# Patient Record
Sex: FEMALE | Race: WHITE | NOT HISPANIC OR LATINO | Employment: OTHER | URBAN - METROPOLITAN AREA
[De-identification: names, ages, dates, MRNs, and addresses within clinical notes are randomized per-mention and may not be internally consistent; named-entity substitution may affect disease eponyms.]

---

## 2024-06-16 ENCOUNTER — HOSPITAL ENCOUNTER (INPATIENT)
Facility: HOSPITAL | Age: 84
LOS: 1 days | Discharge: HOME | DRG: 392 | End: 2024-06-18
Attending: STUDENT IN AN ORGANIZED HEALTH CARE EDUCATION/TRAINING PROGRAM | Admitting: INTERNAL MEDICINE
Payer: MEDICARE

## 2024-06-16 ENCOUNTER — APPOINTMENT (EMERGENCY)
Dept: CT IMAGING | Facility: HOSPITAL | Age: 84
DRG: 392 | End: 2024-06-16
Payer: MEDICARE

## 2024-06-16 DIAGNOSIS — K57.92 DIVERTICULITIS: Primary | ICD-10-CM

## 2024-06-16 DIAGNOSIS — K57.92 ACUTE DIVERTICULITIS: ICD-10-CM

## 2024-06-16 PROBLEM — E11.9 DM (DIABETES MELLITUS) (HCC): Status: ACTIVE | Noted: 2024-06-16

## 2024-06-16 PROBLEM — E78.5 HLD (HYPERLIPIDEMIA): Status: ACTIVE | Noted: 2024-06-16

## 2024-06-16 PROBLEM — I10 HTN (HYPERTENSION): Status: ACTIVE | Noted: 2024-06-16

## 2024-06-16 LAB
ALBUMIN SERPL BCP-MCNC: 3.8 G/DL (ref 3.5–5)
ALP SERPL-CCNC: 79 U/L (ref 34–104)
ALT SERPL W P-5'-P-CCNC: 9 U/L (ref 7–52)
ANION GAP SERPL CALCULATED.3IONS-SCNC: 11 MMOL/L (ref 4–13)
AST SERPL W P-5'-P-CCNC: 11 U/L (ref 13–39)
BACTERIA UR QL AUTO: ABNORMAL /HPF
BASOPHILS # BLD AUTO: 0.04 THOUSANDS/ÂΜL (ref 0–0.1)
BASOPHILS NFR BLD AUTO: 0 % (ref 0–1)
BILIRUB SERPL-MCNC: 0.68 MG/DL (ref 0.2–1)
BILIRUB UR QL STRIP: NEGATIVE
BUDDING YEAST: PRESENT
BUN SERPL-MCNC: 16 MG/DL (ref 5–25)
CALCIUM SERPL-MCNC: 8.5 MG/DL (ref 8.4–10.2)
CHLORIDE SERPL-SCNC: 102 MMOL/L (ref 96–108)
CLARITY UR: CLEAR
CO2 SERPL-SCNC: 23 MMOL/L (ref 21–32)
COLOR UR: COLORLESS
CREAT SERPL-MCNC: 0.85 MG/DL (ref 0.6–1.3)
EOSINOPHIL # BLD AUTO: 0.22 THOUSAND/ÂΜL (ref 0–0.61)
EOSINOPHIL NFR BLD AUTO: 2 % (ref 0–6)
ERYTHROCYTE [DISTWIDTH] IN BLOOD BY AUTOMATED COUNT: 14.9 % (ref 11.6–15.1)
FLUAV RNA RESP QL NAA+PROBE: NEGATIVE
FLUBV RNA RESP QL NAA+PROBE: NEGATIVE
GFR SERPL CREATININE-BSD FRML MDRD: 63 ML/MIN/1.73SQ M
GLUCOSE SERPL-MCNC: 145 MG/DL (ref 65–140)
GLUCOSE SERPL-MCNC: 161 MG/DL (ref 65–140)
GLUCOSE UR STRIP-MCNC: ABNORMAL MG/DL
HCT VFR BLD AUTO: 42.6 % (ref 34.8–46.1)
HGB BLD-MCNC: 14.1 G/DL (ref 11.5–15.4)
HGB UR QL STRIP.AUTO: NEGATIVE
IMM GRANULOCYTES # BLD AUTO: 0.06 THOUSAND/UL (ref 0–0.2)
IMM GRANULOCYTES NFR BLD AUTO: 1 % (ref 0–2)
KETONES UR STRIP-MCNC: NEGATIVE MG/DL
LACTATE SERPL-SCNC: 2.4 MMOL/L (ref 0.5–2)
LACTATE SERPL-SCNC: 3.2 MMOL/L (ref 0.5–2)
LEUKOCYTE ESTERASE UR QL STRIP: ABNORMAL
LIPASE SERPL-CCNC: 17 U/L (ref 11–82)
LYMPHOCYTES # BLD AUTO: 1.73 THOUSANDS/ÂΜL (ref 0.6–4.47)
LYMPHOCYTES NFR BLD AUTO: 17 % (ref 14–44)
MCH RBC QN AUTO: 27.9 PG (ref 26.8–34.3)
MCHC RBC AUTO-ENTMCNC: 33.1 G/DL (ref 31.4–37.4)
MCV RBC AUTO: 84 FL (ref 82–98)
MONOCYTES # BLD AUTO: 0.77 THOUSAND/ÂΜL (ref 0.17–1.22)
MONOCYTES NFR BLD AUTO: 8 % (ref 4–12)
NEUTROPHILS # BLD AUTO: 7.12 THOUSANDS/ÂΜL (ref 1.85–7.62)
NEUTS SEG NFR BLD AUTO: 72 % (ref 43–75)
NITRITE UR QL STRIP: NEGATIVE
NON-SQ EPI CELLS URNS QL MICRO: ABNORMAL /HPF
NRBC BLD AUTO-RTO: 0 /100 WBCS
PH UR STRIP.AUTO: 5 [PH]
PLATELET # BLD AUTO: 260 THOUSANDS/UL (ref 149–390)
PMV BLD AUTO: 8.5 FL (ref 8.9–12.7)
POTASSIUM SERPL-SCNC: 3.6 MMOL/L (ref 3.5–5.3)
PROT SERPL-MCNC: 6.8 G/DL (ref 6.4–8.4)
PROT UR STRIP-MCNC: NEGATIVE MG/DL
RBC # BLD AUTO: 5.05 MILLION/UL (ref 3.81–5.12)
RBC #/AREA URNS AUTO: ABNORMAL /HPF
RSV RNA RESP QL NAA+PROBE: NEGATIVE
SARS-COV-2 RNA RESP QL NAA+PROBE: NEGATIVE
SODIUM SERPL-SCNC: 136 MMOL/L (ref 135–147)
SP GR UR STRIP.AUTO: 1.03 (ref 1–1.03)
UROBILINOGEN UR STRIP-ACNC: <2 MG/DL
WBC # BLD AUTO: 9.94 THOUSAND/UL (ref 4.31–10.16)
WBC #/AREA URNS AUTO: ABNORMAL /HPF

## 2024-06-16 PROCEDURE — 96365 THER/PROPH/DIAG IV INF INIT: CPT

## 2024-06-16 PROCEDURE — 74177 CT ABD & PELVIS W/CONTRAST: CPT

## 2024-06-16 PROCEDURE — 81001 URINALYSIS AUTO W/SCOPE: CPT | Performed by: STUDENT IN AN ORGANIZED HEALTH CARE EDUCATION/TRAINING PROGRAM

## 2024-06-16 PROCEDURE — 0241U HB NFCT DS VIR RESP RNA 4 TRGT: CPT | Performed by: NURSE PRACTITIONER

## 2024-06-16 PROCEDURE — 87086 URINE CULTURE/COLONY COUNT: CPT | Performed by: STUDENT IN AN ORGANIZED HEALTH CARE EDUCATION/TRAINING PROGRAM

## 2024-06-16 PROCEDURE — 83605 ASSAY OF LACTIC ACID: CPT | Performed by: STUDENT IN AN ORGANIZED HEALTH CARE EDUCATION/TRAINING PROGRAM

## 2024-06-16 PROCEDURE — 99284 EMERGENCY DEPT VISIT MOD MDM: CPT

## 2024-06-16 PROCEDURE — 83690 ASSAY OF LIPASE: CPT | Performed by: NURSE PRACTITIONER

## 2024-06-16 PROCEDURE — 96366 THER/PROPH/DIAG IV INF ADDON: CPT

## 2024-06-16 PROCEDURE — 83036 HEMOGLOBIN GLYCOSYLATED A1C: CPT | Performed by: STUDENT IN AN ORGANIZED HEALTH CARE EDUCATION/TRAINING PROGRAM

## 2024-06-16 PROCEDURE — 36415 COLL VENOUS BLD VENIPUNCTURE: CPT | Performed by: NURSE PRACTITIONER

## 2024-06-16 PROCEDURE — 83605 ASSAY OF LACTIC ACID: CPT | Performed by: NURSE PRACTITIONER

## 2024-06-16 PROCEDURE — 80053 COMPREHEN METABOLIC PANEL: CPT | Performed by: NURSE PRACTITIONER

## 2024-06-16 PROCEDURE — 99285 EMERGENCY DEPT VISIT HI MDM: CPT | Performed by: NURSE PRACTITIONER

## 2024-06-16 PROCEDURE — 85025 COMPLETE CBC W/AUTO DIFF WBC: CPT | Performed by: NURSE PRACTITIONER

## 2024-06-16 PROCEDURE — 82948 REAGENT STRIP/BLOOD GLUCOSE: CPT

## 2024-06-16 PROCEDURE — 99223 1ST HOSP IP/OBS HIGH 75: CPT | Performed by: STUDENT IN AN ORGANIZED HEALTH CARE EDUCATION/TRAINING PROGRAM

## 2024-06-16 RX ORDER — SODIUM CHLORIDE 9 MG/ML
100 INJECTION, SOLUTION INTRAVENOUS CONTINUOUS
Status: DISCONTINUED | OUTPATIENT
Start: 2024-06-16 | End: 2024-06-18 | Stop reason: HOSPADM

## 2024-06-16 RX ORDER — METRONIDAZOLE 500 MG/1
500 TABLET ORAL EVERY 8 HOURS SCHEDULED
Status: DISCONTINUED | OUTPATIENT
Start: 2024-06-16 | End: 2024-06-18 | Stop reason: HOSPADM

## 2024-06-16 RX ORDER — INSULIN LISPRO 100 [IU]/ML
1-5 INJECTION, SOLUTION INTRAVENOUS; SUBCUTANEOUS
Status: DISCONTINUED | OUTPATIENT
Start: 2024-06-17 | End: 2024-06-18 | Stop reason: HOSPADM

## 2024-06-16 RX ORDER — ASPIRIN 81 MG/1
81 TABLET, CHEWABLE ORAL DAILY
COMMUNITY

## 2024-06-16 RX ORDER — SODIUM CHLORIDE, SODIUM GLUCONATE, SODIUM ACETATE, POTASSIUM CHLORIDE, MAGNESIUM CHLORIDE, SODIUM PHOSPHATE, DIBASIC, AND POTASSIUM PHOSPHATE .53; .5; .37; .037; .03; .012; .00082 G/100ML; G/100ML; G/100ML; G/100ML; G/100ML; G/100ML; G/100ML
1000 INJECTION, SOLUTION INTRAVENOUS ONCE
Status: COMPLETED | OUTPATIENT
Start: 2024-06-16 | End: 2024-06-16

## 2024-06-16 RX ORDER — ATORVASTATIN CALCIUM 20 MG/1
20 TABLET, FILM COATED ORAL DAILY
COMMUNITY

## 2024-06-16 RX ORDER — LOSARTAN POTASSIUM 25 MG/1
25 TABLET ORAL DAILY
COMMUNITY

## 2024-06-16 RX ORDER — SODIUM CHLORIDE, SODIUM GLUCONATE, SODIUM ACETATE, POTASSIUM CHLORIDE, MAGNESIUM CHLORIDE, SODIUM PHOSPHATE, DIBASIC, AND POTASSIUM PHOSPHATE .53; .5; .37; .037; .03; .012; .00082 G/100ML; G/100ML; G/100ML; G/100ML; G/100ML; G/100ML; G/100ML
1000 INJECTION, SOLUTION INTRAVENOUS ONCE
Status: COMPLETED | OUTPATIENT
Start: 2024-06-16 | End: 2024-06-17

## 2024-06-16 RX ORDER — LOSARTAN POTASSIUM 25 MG/1
25 TABLET ORAL DAILY
Status: DISCONTINUED | OUTPATIENT
Start: 2024-06-17 | End: 2024-06-18 | Stop reason: HOSPADM

## 2024-06-16 RX ORDER — ENOXAPARIN SODIUM 100 MG/ML
40 INJECTION SUBCUTANEOUS DAILY
Status: DISCONTINUED | OUTPATIENT
Start: 2024-06-17 | End: 2024-06-18 | Stop reason: HOSPADM

## 2024-06-16 RX ORDER — METRONIDAZOLE 500 MG/1
500 TABLET ORAL ONCE
Status: COMPLETED | OUTPATIENT
Start: 2024-06-16 | End: 2024-06-16

## 2024-06-16 RX ORDER — ATORVASTATIN CALCIUM 20 MG/1
20 TABLET, FILM COATED ORAL DAILY
Status: DISCONTINUED | OUTPATIENT
Start: 2024-06-17 | End: 2024-06-18 | Stop reason: HOSPADM

## 2024-06-16 RX ADMIN — IOHEXOL 100 ML: 350 INJECTION, SOLUTION INTRAVENOUS at 17:41

## 2024-06-16 RX ADMIN — METOPROLOL TARTRATE 25 MG: 25 TABLET, FILM COATED ORAL at 22:42

## 2024-06-16 RX ADMIN — CEFTRIAXONE SODIUM 1000 MG: 10 INJECTION, POWDER, FOR SOLUTION INTRAVENOUS at 19:47

## 2024-06-16 RX ADMIN — METRONIDAZOLE 500 MG: 500 TABLET ORAL at 22:42

## 2024-06-16 RX ADMIN — SODIUM CHLORIDE, SODIUM GLUCONATE, SODIUM ACETATE, POTASSIUM CHLORIDE, MAGNESIUM CHLORIDE, SODIUM PHOSPHATE, DIBASIC, AND POTASSIUM PHOSPHATE 1000 ML: .53; .5; .37; .037; .03; .012; .00082 INJECTION, SOLUTION INTRAVENOUS at 17:07

## 2024-06-16 RX ADMIN — SODIUM CHLORIDE, SODIUM GLUCONATE, SODIUM ACETATE, POTASSIUM CHLORIDE, MAGNESIUM CHLORIDE, SODIUM PHOSPHATE, DIBASIC, AND POTASSIUM PHOSPHATE 1000 ML: .53; .5; .37; .037; .03; .012; .00082 INJECTION, SOLUTION INTRAVENOUS at 20:53

## 2024-06-16 RX ADMIN — METRONIDAZOLE 500 MG: 500 TABLET ORAL at 19:46

## 2024-06-16 RX ADMIN — SODIUM CHLORIDE 100 ML/HR: 0.9 INJECTION, SOLUTION INTRAVENOUS at 23:07

## 2024-06-16 NOTE — H&P
[facility]  H&P  Name: Violet Dick 84 y.o. female I MRN: [id number]  Unit/Bed#: -01 I Date of Admission: 6/16/2024   Date of Service: 6/16/2024 I Hospital Day: 0      Assessment & Plan   * Acute diverticulitis  Assessment & Plan  84-year-old female patient with past medical history of hearing impairment, mild cognitive decline, diabetes, hypertension, hyperlipidemia, presented to emergency room with complaining of copious amount of diarrhea for last 3 days along with poor p.o. intake.     CBC without leukocytosis.  CMP within normal limits.  Lactic acid 3.2 likely due to diarrhea and poor p.o. intake.  CT abdomen pelvis reported with acute sigmoid diverticulitis without complication, bladder wall thickening consistent with possible cystitis.  Patient was given dose of IV ceftriaxone and Flagyl in the emergency room.    Currently on my encounter patient appears comfortable and not in distress.  Acutely nontoxic appearing.  Hemodynamically stable.  Continue with IV ceftriaxone and Flagyl.  Continue with IV hydration and trend lactic acid until normal.  Follow-up with UA  Continue with supportive care.  Recommended discussed with daughter to follow-up with GI on outpatient basis for colonoscopy in 6 to 8 weeks.    DM (diabetes mellitus) (HCC)  Assessment & Plan    Lab Results   Component Value Date    HGBA1C 6.8 (H) 01/19/2024     Present on admission history of diabetes.  A1c 6.8.  Hold home dose of empagliflozin.  Continue with diabetic diet.  Sliding-scale coverage.    HLD (hyperlipidemia)  Assessment & Plan  Lipid profile for 01/2024 reviewed and indicated.  Noted with mildly elevated triglyceride otherwise within normal limits.  Controlled.  Resume home dose of Lipitor.    HTN (hypertension)  Assessment & Plan  Blood pressure uncontrolled.  Resume home dose of Cozaar, Lopressor.         VTE Pharmacologic Prophylaxis:   Moderate Risk (Score 3-4) - Pharmacological DVT Prophylaxis  Ordered: enoxaparin (Lovenox).  Code Status: Level 1 - Full Code   Discussion with family: Updated  (daughter) at bedside.    Anticipated Length of Stay: Patient will be admitted on an observation basis with an anticipated length of stay of less than 2 midnights secondary to diverticulitis, elevated lactic acid.    Total Time Spent on Date of Encounter in care of patient: 75 mins. This time was spent on one or more of the following: performing physical exam; counseling and coordination of care; obtaining or reviewing history; documenting in the medical record; reviewing/ordering tests, medications or procedures; communicating with other healthcare professionals and discussing with patient's family/caregivers.    Chief Complaint:  copious amount of diarrhea, poor p.o. intake.    History of Present Illness:  Violet Dick is a 84 y.o. female with a PMH of diabetes, hypertension, hyperlipidemia who presents with complaining of copious amount of diarrhea also with poor p.o. intake since last 3 days.  Patient has hearing primary, cognitive decline and currently daughter at bedside assisting with history.  Daughter reports that patient has been having copious amount of diarrhea with poor p.o. intake for last 3 days.  Daughter states that patient usually eats junk food.  Currently on encounter patient appears comfortable nondistressed.  She is in good spirit.  Currently patient denies chest pain, dyspnea, fever, chills, nausea, vomiting, hematuria, melena, hematochezia, any other complaints.    Review of Systems:  Review of Systems   Constitutional:  Negative for chills, diaphoresis, fatigue and fever.   HENT:  Negative for congestion.    Eyes:  Negative for visual disturbance.   Respiratory:  Negative for cough and shortness of breath.    Cardiovascular:  Negative for chest pain, palpitations and leg swelling.   Gastrointestinal:  Positive for diarrhea. Negative for abdominal pain, blood in stool,  "constipation, nausea and vomiting.   Genitourinary:  Negative for dysuria and hematuria.   Neurological:  Negative for weakness.   Psychiatric/Behavioral:  Negative for agitation.        Past Medical and Surgical History:   Past Medical History:   Diagnosis Date    Cancer (HCC)     cervical 10 years ago- chemo radiation    Diabetes mellitus (HCC)     [place] (hard of hearing)     Hypertension        Past Surgical History:   Procedure Laterality Date    HYSTERECTOMY      partial    JOINT REPLACEMENT      foot and elbow       Meds/Allergies:  Prior to Admission medications    Medication Sig Start Date End Date Taking? Authorizing Provider   metoprolol tartrate (LOPRESSOR) 25 mg tablet Take 25 mg by mouth every 12 (twelve) hours   Yes Historical Provider, MD   atorvastatin (LIPITOR) 20 mg tablet Take 20 mg by mouth daily    Historical Provider, MD   Empagliflozin 25 MG TABS Take 25 mg by mouth daily    Historical Provider, MD   losartan (COZAAR) 25 mg tablet Take 25 mg by mouth daily    Historical Provider, MD     I have reviewed home medications with a medical source (PCP, Pharmacy, other).    Allergies: No Known Allergies    Social History:  Marital Status:      Substance Use History:   Social History     Substance and Sexual Activity   Alcohol Use Never     Social History     Tobacco Use   Smoking Status Never   Smokeless Tobacco Never     Social History     Substance and Sexual Activity   Drug Use Never       Family History:  History reviewed. No pertinent family history.    Physical Exam:     Vitals:   Blood Pressure: 159/71 (06/16/24 1620)  Pulse: 78 (06/16/24 1620)  Temperature: 97.8 °F (36.6 °C) (06/16/24 1620)  Temp Source: Oral (06/16/24 1620)  Respirations: 16 (06/16/24 1620)  Height: 4' 11\" (149.9 cm) (06/16/24 1620)  Weight - Scale: 65.8 kg (145 lb) (06/16/24 1620)  SpO2: 98 % (06/16/24 1620)    Physical Exam  Constitutional:       General: She is not in acute distress.     Appearance: She is not " ill-appearing, toxic-appearing or diaphoretic.      Comments: Elderly female patient, acutely nontoxic appearing.   HENT:      Head: Normocephalic and atraumatic.   Eyes:      Pupils: Pupils are equal, round, and reactive to light.   Cardiovascular:      Rate and Rhythm: Normal rate.      Pulses: Normal pulses.   Pulmonary:      Effort: Pulmonary effort is normal. No respiratory distress.      Breath sounds: Normal breath sounds. No wheezing.   Abdominal:      General: Bowel sounds are normal. There is no distension.      Palpations: Abdomen is soft.      Tenderness: There is no abdominal tenderness.   Musculoskeletal:      Right lower leg: No edema.      Left lower leg: No edema.   Neurological:      Mental Status: She is alert and oriented to person, place, and time. Mental status is at baseline.   Psychiatric:         Mood and Affect: Mood normal.         Behavior: Behavior normal.          Additional Data:     Lab Results:  Results from last 7 days   Lab Units 06/16/24  1707   WBC Thousand/uL 9.94   HEMOGLOBIN g/dL 14.1   HEMATOCRIT % 42.6   PLATELETS Thousands/uL 260   SEGS PCT % 72   LYMPHO PCT % 17   MONO PCT % 8   EOS PCT % 2     Results from last 7 days   Lab Units 06/16/24  1707   SODIUM mmol/L 136   POTASSIUM mmol/L 3.6   CHLORIDE mmol/L 102   CO2 mmol/L 23   BUN mg/dL 16   CREATININE mg/dL 0.85   ANION GAP mmol/L 11   CALCIUM mg/dL 8.5   ALBUMIN g/dL 3.8   TOTAL BILIRUBIN mg/dL 0.68   ALK PHOS U/L 79   ALT U/L 9   AST U/L 11*   GLUCOSE RANDOM mg/dL 145*             Lab Results   Component Value Date    HGBA1C 6.8 (H) 01/19/2024     Results from last 7 days   Lab Units 06/16/24  1707   LACTIC ACID mmol/L 3.2*       Lines/Drains:  Invasive Devices       Peripheral Intravenous Line  Duration             Peripheral IV 06/16/24 Right Antecubital <1 day                        Imaging: Reviewed radiology reports from this admission including: abdominal/pelvic CT  CT abdomen pelvis with contrast   Final Result  by Von Adams MD (06/16 1838)      Acute sigmoid diverticulitis. No complication.      Bladder wall thickening with mucosal hyperenhancement consistent with cystitis.      Gallstones without surrounding inflammation.               Workstation performed: [id number]               ** Please Note: This note has been constructed using a voice recognition system. **

## 2024-06-16 NOTE — ASSESSMENT & PLAN NOTE
Lipid profile for 01/2024 reviewed and indicated.  Noted with mildly elevated triglyceride otherwise within normal limits.  Controlled.  Resume home dose of Lipitor.

## 2024-06-16 NOTE — ED PROVIDER NOTES
History  Chief Complaint   Patient presents with    Diarrhea     Daughter reports uncontrollable diarrhea since yesterday causing incontinence. Not eating/drinking.      84-year-old female presenting to the emergency department with her daughter who is providing the primary history.  The daughter reports that she has been having watery diarrhea nonstop since yesterday.  The patient lives alone.  She denies any focal abdominal tenderness but is telling her daughter about pain in her concerns.          Prior to Admission Medications   Prescriptions Last Dose Informant Patient Reported? Taking?   Empagliflozin (Jardiance) 25 MG TABS  Self Yes Yes   Sig: Take 25 mg by mouth every evening   Empagliflozin 25 MG TABS Not Taking  Yes No   Sig: Take 25 mg by mouth daily   Patient not taking: Reported on 6/16/2024   aspirin 81 mg chewable tablet  Self Yes Yes   Sig: Chew 81 mg daily   atorvastatin (LIPITOR) 20 mg tablet   Yes No   Sig: Take 20 mg by mouth daily   losartan (COZAAR) 25 mg tablet   Yes No   Sig: Take 25 mg by mouth daily   metoprolol tartrate (LOPRESSOR) 25 mg tablet  Self Yes Yes   Sig: Take 25 mg by mouth daily      Facility-Administered Medications: None       Past Medical History:   Diagnosis Date    Cancer (HCC)     cervical 10 years ago- chemo radiation    Diabetes mellitus (HCC)     [place] (hard of hearing)     Hypertension        Past Surgical History:   Procedure Laterality Date    HYSTERECTOMY      partial    JOINT REPLACEMENT      foot and elbow       History reviewed. No pertinent family history.  I have reviewed and agree with the history as documented.    E-Cigarette/Vaping    E-Cigarette Use Never User      E-Cigarette/Vaping Substances     Social History     Tobacco Use    Smoking status: Never    Smokeless tobacco: Never   Vaping Use    Vaping status: Never Used   Substance Use Topics    Alcohol use: Never    Drug use: Never       Review of Systems   Constitutional:  Negative for diaphoresis,  fatigue and fever.   HENT:  Negative for congestion, ear pain, nosebleeds and sore throat.    Eyes:  Negative for photophobia, pain, discharge and visual disturbance.   Respiratory:  Negative for cough, choking, chest tightness, shortness of breath and wheezing.    Cardiovascular:  Negative for chest pain and palpitations.   Gastrointestinal:  Positive for diarrhea. Negative for abdominal distention, abdominal pain and vomiting.   Genitourinary:  Negative for dysuria, flank pain and frequency.   Musculoskeletal:  Negative for back pain, gait problem and joint swelling.   Skin:  Negative for color change and rash.   Neurological:  Negative for dizziness, syncope and headaches.   Psychiatric/Behavioral:  Negative for behavioral problems and confusion. The patient is not nervous/anxious.    All other systems reviewed and are negative.      Physical Exam  Physical Exam  Vitals and nursing note reviewed.   Constitutional:       General: She is not in acute distress.     Appearance: She is well-developed. She is not ill-appearing or toxic-appearing.   HENT:      Head: Normocephalic and atraumatic.      Nose: No rhinorrhea.      Mouth/Throat:      Mouth: Mucous membranes are moist.      Dentition: Normal dentition.   Eyes:      General:         Right eye: No discharge.         Left eye: No discharge.   Cardiovascular:      Rate and Rhythm: Normal rate and regular rhythm.   Pulmonary:      Effort: Pulmonary effort is normal. No accessory muscle usage or respiratory distress.   Abdominal:      General: There is no distension.      Tenderness: There is no guarding.   Musculoskeletal:         General: Normal range of motion.      Cervical back: Normal range of motion and neck supple. No rigidity.   Skin:     General: Skin is warm and dry.   Neurological:      Mental Status: She is alert and oriented to person, place, and time.      Coordination: Coordination normal.   Psychiatric:         Behavior: Behavior is cooperative.          Vital Signs  ED Triage Vitals [06/16/24 1620]   Temperature Pulse Respirations Blood Pressure SpO2   97.8 °F (36.6 °C) 78 16 159/71 98 %      Temp Source Heart Rate Source Patient Position - Orthostatic VS BP Location FiO2 (%)   Oral Monitor Sitting Left arm --      Pain Score       --           Vitals:    06/16/24 1620 06/16/24 2037   BP: 159/71 134/87   Pulse: 78    Patient Position - Orthostatic VS: Sitting Lying         Visual Acuity      ED Medications  Medications   multi-electrolyte (ISOLYTE-S PH 7.4) bolus 1,000 mL (1,000 mL Intravenous New Bag 6/16/24 2053)   atorvastatin (LIPITOR) tablet 20 mg (has no administration in time range)   losartan (COZAAR) tablet 25 mg (has no administration in time range)   metoprolol tartrate (LOPRESSOR) tablet 25 mg (has no administration in time range)   enoxaparin (LOVENOX) subcutaneous injection 40 mg (has no administration in time range)   cefTRIAXone (ROCEPHIN) 1,000 mg in dextrose 5 % 50 mL IVPB (has no administration in time range)   metroNIDAZOLE (FLAGYL) tablet 500 mg (has no administration in time range)   sodium chloride 0.9 % infusion (has no administration in time range)   insulin lispro (HumALOG/ADMELOG) 100 units/mL subcutaneous injection 1-5 Units (has no administration in time range)   multi-electrolyte (ISOLYTE-S PH 7.4) bolus 1,000 mL (0 mL Intravenous Stopped 6/16/24 2048)   iohexol (OMNIPAQUE) 350 MG/ML injection (MULTI-DOSE) 100 mL (100 mL Intravenous Given 6/16/24 1741)   ceftriaxone (ROCEPHIN) 1 g/50 mL in dextrose IVPB (0 mg Intravenous Stopped 6/16/24 2048)   metroNIDAZOLE (FLAGYL) tablet 500 mg (500 mg Oral Given 6/16/24 1946)       Diagnostic Studies  Results Reviewed       Procedure Component Value Units Date/Time    FLU/RSV/COVID - if FLU/RSV clinically relevant [[id number]]  (Normal) Collected: 06/16/24 1707    Lab Status: Final result Specimen: Nares from Nose Updated: 06/16/24 9659     SARS-CoV-2 Negative     INFLUENZA A PCR Negative      INFLUENZA B PCR Negative     RSV PCR Negative    Narrative:      FOR PEDIATRIC PATIENTS - copy/paste COVID Guidelines URL to browser: [email or web address]    SARS-CoV-2 assay is a Nucleic Acid Amplification assay intended for the  qualitative detection of nucleic acid from SARS-CoV-2 in nasopharyngeal  swabs. Results are for the presumptive identification of SARS-CoV-2 RNA.    Positive results are indicative of infection with SARS-CoV-2, the virus  causing COVID-19, but do not rule out bacterial infection or co-infection  with other viruses. Laboratories within the [place] and its  territories are required to report all positive results to the appropriate  public health authorities. Negative results do not preclude SARS-CoV-2  infection and should not be used as the sole basis for treatment or other  patient management decisions. Negative results must be combined with  clinical observations, patient history, and epidemiological information.  This test has not been FDA cleared or approved.    This test has been authorized by FDA under an Emergency Use Authorization  (EUA). This test is only authorized for the duration of time the  declaration that circumstances exist justifying the authorization of the  emergency use of an in vitro diagnostic tests for detection of SARS-CoV-2  virus and/or diagnosis of COVID-19 infection under section 564(b)(1) of  the Act, 21 U.S.C. 360bbb-3(b)(1), unless the authorization is terminated  or revoked sooner. The test has been validated but independent review by FDA  and CLIA is pending.    Test performed using [employer] GeneXpert: This RT-PCR assay targets N2,  a region unique to SARS-CoV-2. A conserved region in the E-gene was chosen  for pan-Sarbecovirus detection which includes SARS-CoV-2.    According to CMS-2020-01-R, this platform meets the definition of high-throughput technology.    Lipase [[id number]]  (Normal)  Collected: 06/16/24 1707    Lab Status: Final result Specimen: Blood from Arm, Right Updated: 06/16/24 1731     Lipase 17 u/L     Comprehensive metabolic panel [[id number]]  (Abnormal) Collected: 06/16/24 1707    Lab Status: Final result Specimen: Blood from Arm, Right Updated: 06/16/24 1731     Sodium 136 mmol/L      Potassium 3.6 mmol/L      Chloride 102 mmol/L      CO2 23 mmol/L      ANION GAP 11 mmol/L      BUN 16 mg/dL      Creatinine 0.85 mg/dL      Glucose 145 mg/dL      Calcium 8.5 mg/dL      AST 11 U/L      ALT 9 U/L      Alkaline Phosphatase 79 U/L      Total Protein 6.8 g/dL      Albumin 3.8 g/dL      Total Bilirubin 0.68 mg/dL      eGFR 63 ml/min/1.73sq m     Narrative:      National Kidney Disease Foundation guidelines for Chronic Kidney Disease (CKD):     Stage 1 with normal or high GFR (GFR > 90 mL/min/1.73 square meters)    Stage 2 Mild CKD (GFR = 60-89 mL/min/1.73 square meters)    Stage 3A Moderate CKD (GFR = 45-59 mL/min/1.73 square meters)    Stage 3B Moderate CKD (GFR = 30-44 mL/min/1.73 square meters)    Stage 4 Severe CKD (GFR = 15-29 mL/min/1.73 square meters)    Stage 5 End Stage CKD (GFR <15 mL/min/1.73 square meters)  Note: GFR calculation is accurate only with a steady state creatinine    Lactic acid, plasma (w/reflex if result > 2.0) [[id number]]  (Abnormal) Collected: 06/16/24 1707    Lab Status: Final result Specimen: Blood from Arm, Right Updated: 06/16/24 1730     LACTIC ACID 3.2 mmol/L     Narrative:      Result may be elevated if tourniquet was used during collection.    Lactic acid 2 Hours [[id number]]     Lab Status: No result Specimen: Blood     CBC and differential [[id number]]  (Abnormal) Collected: 06/16/24 1707    Lab Status: Final result Specimen: Blood from Arm, Right Updated: 06/16/24 1714     WBC 9.94 Thousand/uL      RBC 5.05 Million/uL      Hemoglobin 14.1 g/dL      Hematocrit 42.6 %      MCV 84 fL      MCH 27.9 pg      MCHC 33.1 g/dL      RDW 14.9 %      MPV 8.5 fL       Platelets 260 Thousands/uL      nRBC 0 /100 WBCs      Segmented % 72 %      Immature Grans % 1 %      Lymphocytes % 17 %      Monocytes % 8 %      Eosinophils Relative 2 %      Basophils Relative 0 %      Absolute Neutrophils 7.12 Thousands/µL      Absolute Immature Grans 0.06 Thousand/uL      Absolute Lymphocytes 1.73 Thousands/µL      Absolute Monocytes 0.77 Thousand/µL      Eosinophils Absolute 0.22 Thousand/µL      Basophils Absolute 0.04 Thousands/µL     Stool Enteric Bacterial Panel by PCR [[id number]]     Lab Status: No result Specimen: Stool from Rectum     Clostridium difficile toxin by PCR with EIA [[id number]]     Lab Status: No result Specimen: Stool from Per Rectum                    CT abdomen pelvis with contrast   Final Result by Von Adams MD (06/16 1838)      Acute sigmoid diverticulitis. No complication.      Bladder wall thickening with mucosal hyperenhancement consistent with cystitis.      Gallstones without surrounding inflammation.               Workstation performed: [id number]                    Procedures  Procedures         ED Course               Identification of Seniors at Risk      Flowsheet Row Most Recent Value   (ISAR) Identification of Seniors at Risk    Before the illness or injury that brought you to the Emergency, did you need someone to help you on a regular basis? 1 Filed at: 06/16/2024 1626   In the last 24 hours, have you needed more help than usual? 0 Filed at: 06/16/2024 1626   Have you been hospitalized for one or more nights during the past 6 months? 0 Filed at: 06/16/2024 1626   In general, do you see well? 0 Filed at: 06/16/2024 1626   In general, do you have serious problems with your memory? 0 Filed at: 06/16/2024 1626   Do you take more than three different medications every day? 1 Filed at: 06/16/2024 1626   ISAR Score 2 Filed at: 06/16/2024 1626                        SBIRT 20yo+      Flowsheet Row Most Recent Value   Initial Alcohol Screen: US AUDIT-C     1.  How often do you have a drink containing alcohol? 0 Filed at: 06/16/2024 1627   2. How many drinks containing alcohol do you have on a typical day you are drinking?  0 Filed at: 06/16/2024 1627   3a. Male UNDER 65: How often do you have five or more drinks on one occasion? 0 Filed at: 06/16/2024 1627   3b. FEMALE Any Age, or MALE 65+: How often do you have 4 or more drinks on one occassion? 0 Filed at: 06/16/2024 1627   Audit-C Score 0 Filed at: 06/16/2024 1627   DIVYA: How many times in the past year have you...    Used an illegal drug or used a prescription medication for non-medical reasons? Never Filed at: 06/16/2024 1627                      Medical Decision Making  Diverticulitis with elevated lactic acid most likely from volume depletion.  Will bring into the hospital for diverticulitis treatment despite being uncomplicated.  She does have some risk for poor outcomes if she is at home alone given her advanced age.    Amount and/or Complexity of Data Reviewed  Labs: ordered.  Radiology: ordered.    Risk  Prescription drug management.  Decision regarding hospitalization.             Disposition  Final diagnoses:   Diverticulitis     Time reflects when diagnosis was documented in both MDM as applicable and the Disposition within this note       Time User Action Codes Description Comment    6/16/2024  6:55 PM Chapo Waters Add [K57.92] Diverticulitis           ED Disposition       ED Disposition   Admit    Condition   Stable    Date/Time   Sun Jun 16, 2024 5211    Comment   Case was discussed with Kristi and the patient's admission status was agreed to be Admission Status: observation status to the service of Dr. Berry .               Follow-up Information    None         Current Discharge Medication List        CONTINUE these medications which have NOT CHANGED    Details   aspirin 81 mg chewable tablet Chew 81 mg daily      !! Empagliflozin (Jardiance) 25 MG TABS Take 25 mg by mouth every evening       metoprolol tartrate (LOPRESSOR) 25 mg tablet Take 25 mg by mouth daily      atorvastatin (LIPITOR) 20 mg tablet Take 20 mg by mouth daily      !! Empagliflozin 25 MG TABS Take 25 mg by mouth daily      losartan (COZAAR) 25 mg tablet Take 25 mg by mouth daily       !! - Potential duplicate medications found. Please discuss with provider.          No discharge procedures on file.    PDMP Review       None            ED Provider  Electronically Signed by             JIGNESH Ordoñez  06/16/24 1831

## 2024-06-16 NOTE — ASSESSMENT & PLAN NOTE
Lab Results   Component Value Date    HGBA1C 6.8 (H) 01/19/2024     Present on admission history of diabetes.  A1c 6.8.  Hold home dose of empagliflozin.  Continue with diabetic diet.  Sliding-scale coverage.

## 2024-06-16 NOTE — ASSESSMENT & PLAN NOTE
84-year-old female patient with past medical history of hearing impairment, mild cognitive decline, diabetes, hypertension, hyperlipidemia, presented to emergency room with complaining of copious amount of diarrhea for last 3 days along with poor p.o. intake.     CBC without leukocytosis.  CMP within normal limits.  Lactic acid 3.2 likely due to diarrhea and poor p.o. intake.  CT abdomen pelvis reported with acute sigmoid diverticulitis without complication, bladder wall thickening consistent with possible cystitis.  Patient was given dose of IV ceftriaxone and Flagyl in the emergency room.    Currently on my encounter patient appears comfortable and not in distress.  Acutely nontoxic appearing.  Hemodynamically stable.  Continue with IV ceftriaxone and Flagyl.  Continue with IV hydration and trend lactic acid until normal.  Follow-up with [facility]A  Continue with supportive care.  Recommended discussed with daughter to follow-up with GI on outpatient basis for colonoscopy in 6 to 8 weeks.

## 2024-06-17 LAB
ANION GAP SERPL CALCULATED.3IONS-SCNC: 7 MMOL/L (ref 4–13)
BUN SERPL-MCNC: 13 MG/DL (ref 5–25)
CALCIUM SERPL-MCNC: 8.4 MG/DL (ref 8.4–10.2)
CHLORIDE SERPL-SCNC: 102 MMOL/L (ref 96–108)
CO2 SERPL-SCNC: 28 MMOL/L (ref 21–32)
CREAT SERPL-MCNC: 0.84 MG/DL (ref 0.6–1.3)
ERYTHROCYTE [DISTWIDTH] IN BLOOD BY AUTOMATED COUNT: 15.1 % (ref 11.6–15.1)
EST. AVERAGE GLUCOSE BLD GHB EST-MCNC: 143 MG/DL
GFR SERPL CREATININE-BSD FRML MDRD: 64 ML/MIN/1.73SQ M
GLUCOSE SERPL-MCNC: 102 MG/DL (ref 65–140)
GLUCOSE SERPL-MCNC: 123 MG/DL (ref 65–140)
GLUCOSE SERPL-MCNC: 132 MG/DL (ref 65–140)
GLUCOSE SERPL-MCNC: 136 MG/DL (ref 65–140)
GLUCOSE SERPL-MCNC: 149 MG/DL (ref 65–140)
HBA1C MFR BLD: 6.6 %
HCT VFR BLD AUTO: 40.9 % (ref 34.8–46.1)
HGB BLD-MCNC: 13.2 G/DL (ref 11.5–15.4)
LACTATE SERPL-SCNC: 1.5 MMOL/L (ref 0.5–2)
MCH RBC QN AUTO: 27.5 PG (ref 26.8–34.3)
MCHC RBC AUTO-ENTMCNC: 32.3 G/DL (ref 31.4–37.4)
MCV RBC AUTO: 85 FL (ref 82–98)
PLATELET # BLD AUTO: 267 THOUSANDS/UL (ref 149–390)
PMV BLD AUTO: 9.8 FL (ref 8.9–12.7)
POTASSIUM SERPL-SCNC: 3.7 MMOL/L (ref 3.5–5.3)
RBC # BLD AUTO: 4.8 MILLION/UL (ref 3.81–5.12)
SODIUM SERPL-SCNC: 137 MMOL/L (ref 135–147)
WBC # BLD AUTO: 11.3 THOUSAND/UL (ref 4.31–10.16)

## 2024-06-17 PROCEDURE — 83605 ASSAY OF LACTIC ACID: CPT | Performed by: FAMILY MEDICINE

## 2024-06-17 PROCEDURE — 82948 REAGENT STRIP/BLOOD GLUCOSE: CPT

## 2024-06-17 PROCEDURE — 85027 COMPLETE CBC AUTOMATED: CPT | Performed by: FAMILY MEDICINE

## 2024-06-17 PROCEDURE — 99239 HOSP IP/OBS DSCHRG MGMT >30: CPT | Performed by: FAMILY MEDICINE

## 2024-06-17 PROCEDURE — 80048 BASIC METABOLIC PNL TOTAL CA: CPT | Performed by: FAMILY MEDICINE

## 2024-06-17 RX ORDER — CIPROFLOXACIN 500 MG/1
500 TABLET, FILM COATED ORAL EVERY 12 HOURS SCHEDULED
Qty: 16 TABLET | Refills: 0 | Status: SHIPPED | OUTPATIENT
Start: 2024-06-17 | End: 2024-06-25

## 2024-06-17 RX ORDER — METRONIDAZOLE 500 MG/1
500 TABLET ORAL EVERY 8 HOURS SCHEDULED
Qty: 24 TABLET | Refills: 0 | Status: SHIPPED | OUTPATIENT
Start: 2024-06-17 | End: 2024-06-25

## 2024-06-17 RX ADMIN — CEFTRIAXONE SODIUM 1000 MG: 10 INJECTION, POWDER, FOR SOLUTION INTRAVENOUS at 19:57

## 2024-06-17 RX ADMIN — ENOXAPARIN SODIUM 40 MG: 40 INJECTION SUBCUTANEOUS at 08:05

## 2024-06-17 RX ADMIN — LOSARTAN POTASSIUM 25 MG: 25 TABLET, FILM COATED ORAL at 08:05

## 2024-06-17 RX ADMIN — METRONIDAZOLE 500 MG: 500 TABLET ORAL at 05:42

## 2024-06-17 RX ADMIN — METRONIDAZOLE 500 MG: 500 TABLET ORAL at 23:59

## 2024-06-17 RX ADMIN — METOPROLOL TARTRATE 25 MG: 25 TABLET, FILM COATED ORAL at 20:00

## 2024-06-17 RX ADMIN — METOPROLOL TARTRATE 25 MG: 25 TABLET, FILM COATED ORAL at 08:05

## 2024-06-17 RX ADMIN — ATORVASTATIN CALCIUM 20 MG: 20 TABLET, FILM COATED ORAL at 08:05

## 2024-06-17 RX ADMIN — METRONIDAZOLE 500 MG: 500 TABLET ORAL at 14:31

## 2024-06-17 NOTE — ASSESSMENT & PLAN NOTE
Lab Results   Component Value Date    HGBA1C 6.6 (H) 06/16/2024     Present on admission history of diabetes.  A1c 6.8.  Hold home dose of empagliflozin.  Continue with diabetic diet.  Sliding-scale coverage.

## 2024-06-17 NOTE — ASSESSMENT & PLAN NOTE
"/61   Pulse 95   Temp 97.7 °F (36.5 °C)   Resp 15   Ht 4' 11\" (1.499 m)   Wt 68.5 kg (151 lb 0.2 oz)   SpO2 97%   BMI 30.50 kg/m²   Blood pressure controlled  Resume home dose of Cozaar, Lopressor.  "

## 2024-06-17 NOTE — DISCHARGE INSTR - AVS FIRST PAGE
Take antibiotics: Ciprofloxacin and metronidazole as prescribed  Make an appointment with gastroenterology for an appointment in the office  Make an appointment with your PCP for a follow-up  Drink plenty of water with electrolytes  Drink a low fiber diet

## 2024-06-17 NOTE — ASSESSMENT & PLAN NOTE
Patient presented with copious amount of diarrhea for the last 3 days  Continue to follow-up on improvement of diarrhea  CT shows acute sigmoid diverticulitis, uncomplicated  Continue IV ceftriaxone and Flagyl  Follow-up on cultures  Does not meet SIRS criteria currently  Lactic acid normal, no more diarrhea to collect stool sample for C. difficile for stool PCR  Will need outpatient gastroenterology follow-up for colonoscopy once the acute illness resolves

## 2024-06-17 NOTE — QUICK NOTE
Discharge canceled as patient now has 5 episodes of diarrhea which was reported by patient's daughter.  Not witnessed by nursing  Nursing advised to give patient a bedside commode so that they can monitor the diarrhea  Nursing advised to send for C. difficile and stool PCR  Plan to continue IV ceftriaxone and Flagyl

## 2024-06-17 NOTE — PLAN OF CARE
Problem: Nutrition/Hydration-ADULT  Goal: Nutrient/Hydration intake appropriate for improving, restoring or maintaining nutritional needs  Description: Monitor and assess patient's nutrition/hydration status for malnutrition. Collaborate with interdisciplinary team and initiate plan and interventions as ordered.  Monitor patient's weight and dietary intake as ordered or per policy. Utilize nutrition screening tool and intervene as necessary. Determine patient's food preferences and provide high-protein, high-caloric foods as appropriate.     INTERVENTIONS:  - Monitor oral intake, urinary output, labs, and treatment plans  - Assess nutrition and hydration status and recommend course of action  - Evaluate amount of meals eaten  - Assist patient with eating if necessary   - Allow adequate time for meals  - Recommend/ encourage appropriate diets, oral nutritional supplements, and vitamin/mineral supplements  - Order, calculate, and assess calorie counts as needed  - Recommend, monitor, and adjust tube feedings and TPN/PPN based on assessed needs  - Assess need for intravenous fluids  - Provide specific nutrition/hydration education as appropriate  - Include patient/family/caregiver in decisions related to nutrition  Outcome: Progressing     Problem: PAIN - ADULT  Goal: Verbalizes/displays adequate comfort level or baseline comfort level  Description: Interventions:  - Encourage patient to monitor pain and request assistance  - Assess pain using appropriate pain scale  - Administer analgesics based on type and severity of pain and evaluate response  - Implement non-pharmacological measures as appropriate and evaluate response  - Consider cultural and social influences on pain and pain management  - Notify physician/advanced practitioner if interventions unsuccessful or patient reports new pain  Outcome: Progressing     Problem: INFECTION - ADULT  Goal: Absence or prevention of progression during  hospitalization  Description: INTERVENTIONS:  - Assess and monitor for signs and symptoms of infection  - Monitor lab/diagnostic results  - Monitor all insertion sites, i.e. indwelling lines, tubes, and drains  - Monitor endotracheal if appropriate and nasal secretions for changes in amount and color  - [place] appropriate cooling/warming therapies per order  - Administer medications as ordered  - Instruct and encourage patient and family to use good hand hygiene technique  - Identify and instruct in appropriate isolation precautions for identified infection/condition  Outcome: Progressing     Problem: SAFETY ADULT  Goal: Patient will remain free of falls  Description: INTERVENTIONS:  - Educate patient/family on patient safety including physical limitations  - Instruct patient to call for assistance with activity   - Consult OT/PT to assist with strengthening/mobility   - Keep Call bell within reach  - Keep bed low and locked with side rails adjusted as appropriate  - Keep care items and personal belongings within reach  - Initiate and maintain comfort rounds  - Make Fall Risk Sign visible to staff  - Apply yellow socks and bracelet for high fall risk patients  - Consider moving patient to room near nurses station  Outcome: Progressing  Goal: Maintain or return to baseline ADL function  Description: INTERVENTIONS:  -  Assess patient's ability to carry out ADLs; assess patient's baseline for ADL function and identify physical deficits which impact ability to perform ADLs (bathing, care of mouth/teeth, toileting, grooming, dressing, etc.)  - Assess/evaluate cause of self-care deficits   - Assess range of motion  - Assess patient's mobility; develop plan if impaired  - Assess patient's need for assistive devices and provide as appropriate  - Encourage maximum independence but intervene and supervise when necessary  - Involve family in performance of ADLs  - Assess for home care needs following discharge   - Consider  OT consult to assist with ADL evaluation and planning for discharge  - Provide patient education as appropriate  Outcome: Progressing  Goal: Maintains/Returns to pre admission functional level  Description: INTERVENTIONS:  - Perform AM-PAC 6 Click Basic Mobility/ Daily Activity assessment daily.  - Set and communicate daily mobility goal to care team and patient/family/caregiver.   - Collaborate with rehabilitation services on mobility goals if consulted  - Out of bed for toileting  - Record patient progress and toleration of activity level   Outcome: Progressing     Problem: DISCHARGE PLANNING  Goal: Discharge to home or other facility with appropriate resources  Description: INTERVENTIONS:  - Identify barriers to discharge w/patient and caregiver  - Arrange for needed discharge resources and transportation as appropriate  - Identify discharge learning needs (meds, wound care, etc.)  - Arrange for interpretive services to assist at discharge as needed  - Refer to Case Management Department for coordinating discharge planning if the patient needs post-hospital services based on physician/advanced practitioner order or complex needs related to functional status, cognitive ability, or social support system  Outcome: Progressing     Problem: Knowledge Deficit  Goal: Patient/family/caregiver demonstrates understanding of disease process, treatment plan, medications, and discharge instructions  Description: Complete learning assessment and assess knowledge base.  Interventions:  - Provide teaching at level of understanding  - Provide teaching via preferred learning methods  Outcome: Progressing

## 2024-06-17 NOTE — PLAN OF CARE
Problem: Nutrition/Hydration-ADULT  Goal: Nutrient/Hydration intake appropriate for improving, restoring or maintaining nutritional needs  Description: Monitor and assess patient's nutrition/hydration status for malnutrition. Collaborate with interdisciplinary team and initiate plan and interventions as ordered.  Monitor patient's weight and dietary intake as ordered or per policy. Utilize nutrition screening tool and intervene as necessary. Determine patient's food preferences and provide high-protein, high-caloric foods as appropriate.     INTERVENTIONS:  - Monitor oral intake, urinary output, labs, and treatment plans  - Assess nutrition and hydration status and recommend course of action  - Evaluate amount of meals eaten  - Assist patient with eating if necessary   - Allow adequate time for meals  - Recommend/ encourage appropriate diets, oral nutritional supplements, and vitamin/mineral supplements  - Order, calculate, and assess calorie counts as needed  - Recommend, monitor, and adjust tube feedings and TPN/PPN based on assessed needs  - Assess need for intravenous fluids  - Provide specific nutrition/hydration education as appropriate  - Include patient/family/caregiver in decisions related to nutrition  6/17/2024 1111 by Martha High RN  Outcome: Progressing  6/17/2024 1111 by Martha High RN  Outcome: Progressing     Problem: PAIN - ADULT  Goal: Verbalizes/displays adequate comfort level or baseline comfort level  Description: Interventions:  - Encourage patient to monitor pain and request assistance  - Assess pain using appropriate pain scale  - Administer analgesics based on type and severity of pain and evaluate response  - Implement non-pharmacological measures as appropriate and evaluate response  - Consider cultural and social influences on pain and pain management  - Notify physician/advanced practitioner if interventions unsuccessful or patient reports new pain  6/17/2024 1111  by Martha High RN  Outcome: Progressing  6/17/2024 1111 by Martha High RN  Outcome: Progressing     Problem: INFECTION - ADULT  Goal: Absence or prevention of progression during hospitalization  Description: INTERVENTIONS:  - Assess and monitor for signs and symptoms of infection  - Monitor lab/diagnostic results  - Monitor all insertion sites, i.e. indwelling lines, tubes, and drains  - Monitor endotracheal if appropriate and nasal secretions for changes in amount and color  - [place] appropriate cooling/warming therapies per order  - Administer medications as ordered  - Instruct and encourage patient and family to use good hand hygiene technique  - Identify and instruct in appropriate isolation precautions for identified infection/condition  6/17/2024 1111 by Martha High RN  Outcome: Progressing  6/17/2024 1111 by Martha High RN  Outcome: Progressing     Problem: SAFETY ADULT  Goal: Patient will remain free of falls  Description: INTERVENTIONS:  - Educate patient/family on patient safety including physical limitations  - Instruct patient to call for assistance with activity   - Consult OT/PT to assist with strengthening/mobility   - Keep Call bell within reach  - Keep bed low and locked with side rails adjusted as appropriate  - Keep care items and personal belongings within reach  - Initiate and maintain comfort rounds  - Apply yellow socks and bracelet for high fall risk patients  - Consider moving patient to room near nurses station  6/17/2024 1111 by Martha High RN  Outcome: Progressing  6/17/2024 1111 by Martha High RN  Outcome: Progressing  Goal: Maintain or return to baseline ADL function  Description: INTERVENTIONS:  -  Assess patient's ability to carry out ADLs; assess patient's baseline for ADL function and identify physical deficits which impact ability to perform ADLs (bathing, care of mouth/teeth, toileting, grooming, dressing, etc.)  -  Assess/evaluate cause of self-care deficits   - Assess range of motion  - Assess patient's mobility; develop plan if impaired  - Assess patient's need for assistive devices and provide as appropriate  - Encourage maximum independence but intervene and supervise when necessary  - Involve family in performance of ADLs  - Assess for home care needs following discharge   - Consider OT consult to assist with ADL evaluation and planning for discharge  - Provide patient education as appropriate  6/17/2024 1111 by Martha High RN  Outcome: Progressing  6/17/2024 1111 by Martha High RN  Outcome: Progressing  Goal: Maintains/Returns to pre admission functional level  Description: INTERVENTIONS:  - Perform AM-PAC 6 Click Basic Mobility/ Daily Activity assessment daily.  - Set and communicate daily mobility goal to care team and patient/family/caregiver.   - Collaborate with rehabilitation services on mobility goals if consulted  - Out of bed for toileting  - Record patient progress and toleration of activity level   6/17/2024 1111 by Martha High RN  Outcome: Progressing  6/17/2024 1111 by Martha High RN  Outcome: Progressing     Problem: DISCHARGE PLANNING  Goal: Discharge to home or other facility with appropriate resources  Description: INTERVENTIONS:  - Identify barriers to discharge w/patient and caregiver  - Arrange for needed discharge resources and transportation as appropriate  - Identify discharge learning needs (meds, wound care, etc.)  - Arrange for interpretive services to assist at discharge as needed  - Refer to Case Management Department for coordinating discharge planning if the patient needs post-hospital services based on physician/advanced practitioner order or complex needs related to functional status, cognitive ability, or social support system  6/17/2024 1111 by Martha High RN  Outcome: Progressing  6/17/2024 1111 by Martha High RN  Outcome:  Progressing     Problem: Knowledge Deficit  Goal: Patient/family/caregiver demonstrates understanding of disease process, treatment plan, medications, and discharge instructions  Description: Complete learning assessment and assess knowledge base.  Interventions:  - Provide teaching at level of understanding  - Provide teaching via preferred learning methods  6/17/2024 1111 by Martha High RN  Outcome: Progressing  6/17/2024 1111 by Martha High RN  Outcome: Progressing

## 2024-06-17 NOTE — DISCHARGE SUMMARY
"[facility]  Discharge- Violet Dick 1940, 84 y.o. female MRN: [id number]  Unit/Bed#: [facility]-01 Encounter: [id number]  Primary Care Provider: Bela Kate   Date and time admitted to hospital: 6/16/2024  4:42 PM    * Acute diverticulitis  Assessment & Plan  Patient presented with copious amount of diarrhea for the last 3 days  Continue to follow-up on improvement of diarrhea  CT shows acute sigmoid diverticulitis, uncomplicated  Continue IV ceftriaxone and Flagyl  Follow-up on cultures  Does not meet SIRS criteria currently  Lactic acid normal, no more diarrhea to collect stool sample for C. difficile for stool PCR  Will need outpatient gastroenterology follow-up for colonoscopy once the acute illness resolves    DM (diabetes mellitus) (HCC)  Assessment & Plan    Lab Results   Component Value Date    HGBA1C 6.6 (H) 06/16/2024     Present on admission history of diabetes.  A1c 6.8.  Hold home dose of empagliflozin.  Continue with diabetic diet.  Sliding-scale coverage.    HLD (hyperlipidemia)  Assessment & Plan  Lipid profile for 01/2024 reviewed and indicated.  Noted with mildly elevated triglyceride otherwise within normal limits.  Controlled.  Resume home dose of Lipitor.    HTN (hypertension)  Assessment & Plan  /61   Pulse 95   Temp 97.7 °F (36.5 °C)   Resp 15   Ht 4' 11\" (1.499 m)   Wt 68.5 kg (151 lb 0.2 oz)   SpO2 97%   BMI 30.50 kg/m²   Blood pressure controlled  Resume home dose of Cozaar, Lopressor.      Medical Problems       Resolved Problems  Date Reviewed: 6/17/2024   None       Discharging Physician / Practitioner: Adalid Vela MD  PCP: Bela Kate  Admission Date:   Admission Orders (From admission, onward)       Ordered        06/16/24 1857  Place in Observation  Once                          Discharge Date: 06/17/24    Consultations During Hospital Stay:  None    Procedures Performed:   CT abd pelvis    Significant Findings / Test Results:   Acute " "diverticulitis    Incidental Findings:   none     Test Results Pending at Discharge (will require follow up):   none     Outpatient Tests Requested:  none    Complications:  none    Reason for Admission: Diarrhea    Hospital Course:   Violet Dick is a 84 y.o. female patient who originally presented to the hospital on 6/16/2024 due to diarrhea at home.  Patient's daughter said that patient was unable to drink plenty of water secondary to diarrhea and she was concerned of dehydration.  CT scan showed acute diverticulitis of the sigmoid colon.  Patient has been treated with IV ceftriaxone and Flagyl.  Her diarrhea has resolved completely.  She denies any GI symptoms.  There is no abdominal pain or tenderness.  No SIRS criteria.  Patient wants to go home.  Considering that this is uncomplicated diverticulitis which has resulted symptoms after 1 dose of antibiotics, patient is safe for discharge home with close follow-up outpatient with PCP.  She will be discharged home on Cipro and Flagyl.  Patient and daughter have been instructed to hydrate plenty and eat a low fiber diet.  They are in complete agreement with the discharge plan.  All their questions have been answered to satisfaction    Please see above list of diagnoses and related plan for additional information.     Condition at Discharge: stable    Discharge Day Visit / Exam:   Subjective:  \" I am feeling much better, I would like to go home\"  Vitals: Blood Pressure: 146/61 (06/17/24 0713)  Pulse: 95 (06/16/24 2246)  Temperature: 97.7 °F (36.5 °C) (06/17/24 0713)  Temp Source: Oral (06/16/24 2037)  Respirations: 15 (06/16/24 2037)  Height: 4' 11\" (149.9 cm) (06/16/24 2037)  Weight - Scale: 68.5 kg (151 lb 0.2 oz) (06/16/24 2037)  SpO2: 97 % (06/16/24 2246)  Exam:   Physical Exam General- Awake, alert and oriented x 3, looks comfortable, extremely hard of hearing  HEENT- Normocephalic, atraumatic, oral mucosa- moist  Neck- Supple, No carotid bruit, no JVD  CVS- " Normal S1/ S2, Regular rate and rhythm, No murmur, No edema  Respiratory system- B/L clear breath sounds, no wheezing  Abdomen- Soft, Non distended, no tenderness, Bowel sound- present 4 quads  Genitourinary- No suprapubic tenderness, No CVA tenderness  Skin- No new bruise or rash  Musculoskeletal- No gross deformity  Psych- No acute psychosis  CNS- CN II- XII grossly intact, No acute focal neurologic deficit noted      Discussion with Family: Updated  (daughter) at bedside.  Detailed discussion regarding current assessment and plan and labs and CT scan findings have been had with the patient and patient's daughter.  They are in complete agreement with the current discharge planning and will follow-up outpatient with gastroenterology and her PCP      Discharge instructions/Information to patient and family:   See after visit summary for information provided to patient and family.      Provisions for Follow-Up Care:  See after visit summary for information related to follow-up care and any pertinent home health orders.      Mobility at time of Discharge:   Basic Mobility Inpatient Raw Score: 23  JH-HLM Goal: 7: Walk 25 feet or more  JH-HLM Achieved: 8: Walk 250 feet ot more  HLM Goal achieved. Continue to encourage appropriate mobility.     Disposition:   Home    Planned Readmission: no     Discharge Statement:  I spent 75 minutes discharging the patient. This time was spent on the day of discharge. I had direct contact with the patient on the day of discharge. Greater than 50% of the total time was spent examining patient, answering all patient questions, arranging and discussing plan of care with patient as well as directly providing post-discharge instructions.  Additional time then spent on discharge activities.    Discharge Medications:  See after visit summary for reconciled discharge medications provided to patient and/or family.      **Please Note: This note may have been constructed using a  voice recognition system**

## 2024-06-18 VITALS
BODY MASS INDEX: 30.44 KG/M2 | DIASTOLIC BLOOD PRESSURE: 61 MMHG | RESPIRATION RATE: 16 BRPM | SYSTOLIC BLOOD PRESSURE: 172 MMHG | OXYGEN SATURATION: 97 % | HEART RATE: 95 BPM | TEMPERATURE: 97.9 F | WEIGHT: 151.01 LBS | HEIGHT: 59 IN

## 2024-06-18 LAB — GLUCOSE SERPL-MCNC: 102 MG/DL (ref 65–140)

## 2024-06-18 PROCEDURE — 99239 HOSP IP/OBS DSCHRG MGMT >30: CPT | Performed by: INTERNAL MEDICINE

## 2024-06-18 PROCEDURE — 82948 REAGENT STRIP/BLOOD GLUCOSE: CPT

## 2024-06-18 RX ORDER — LABETALOL HYDROCHLORIDE 5 MG/ML
10 INJECTION, SOLUTION INTRAVENOUS EVERY 6 HOURS PRN
Status: DISCONTINUED | OUTPATIENT
Start: 2024-06-18 | End: 2024-06-18 | Stop reason: HOSPADM

## 2024-06-18 RX ORDER — HYDRALAZINE HYDROCHLORIDE 20 MG/ML
10 INJECTION INTRAMUSCULAR; INTRAVENOUS EVERY 6 HOURS PRN
Status: DISCONTINUED | OUTPATIENT
Start: 2024-06-18 | End: 2024-06-18 | Stop reason: HOSPADM

## 2024-06-18 RX ADMIN — METOPROLOL TARTRATE 25 MG: 25 TABLET, FILM COATED ORAL at 08:52

## 2024-06-18 RX ADMIN — ATORVASTATIN CALCIUM 20 MG: 20 TABLET, FILM COATED ORAL at 08:52

## 2024-06-18 RX ADMIN — LOSARTAN POTASSIUM 25 MG: 25 TABLET, FILM COATED ORAL at 08:52

## 2024-06-18 RX ADMIN — METRONIDAZOLE 500 MG: 500 TABLET ORAL at 05:34

## 2024-06-18 RX ADMIN — SODIUM CHLORIDE 100 ML/HR: 0.9 INJECTION, SOLUTION INTRAVENOUS at 05:34

## 2024-06-18 RX ADMIN — ENOXAPARIN SODIUM 40 MG: 40 INJECTION SUBCUTANEOUS at 08:52

## 2024-06-18 NOTE — CASE MANAGEMENT
Case Management Assessment & Discharge Planning Note    Patient name Violet Dick  Location /-01 MRN [id number]  : 1940 Date 2024       Current Admission Date: 2024  Current Admission Diagnosis:Acute diverticulitis   Patient Active Problem List    Diagnosis Date Noted Date Diagnosed    HTN (hypertension) 2024     HLD (hyperlipidemia) 2024     DM (diabetes mellitus) (HCC) 2024     Acute diverticulitis 2024       LOS (days): 0  Geometric Mean LOS (GMLOS) (days):   Days to GMLOS:     OBJECTIVE:          Current admission status: Inpatient       Preferred Pharmacy:   [id number] [place] - [address]  [address]  [place] [address]  Phone: [phone] Fax: [phone]    Primary Care Provider: Bela Kate    Primary Insurance: MEDICARE  Secondary Insurance: [place] [employer] [facility]    ASSESSMENT:  Active Health Care Proxies       Marsha Short Health Care Representative - Daughter   Primary Phone: [phone] (Mobile)                 Advance Directives  Does patient have a Health Care POA?: No  Was patient offered paperwork?: Yes  Does patient currently have a Health Care decision maker?: Yes, please see Health Care Proxy section  Does patient have Advance Directives?: No  Was patient offered paperwork?: Yes  Primary Contact: Marsha (Daughter)  [phone]    Readmission Root Cause  30 Day Readmission: No    Patient Information  Admitted from:: Home  Mental Status: Alert  During Assessment patient was accompanied by: Not accompanied during assessment  Assessment information provided by:: Daughter  Primary Caregiver: Self  Caregiver's Name:: Marsha (Daughter)  [phone]  Caregiver's Relationship to Patient:: Family Member  Support Systems: Daughter, Family members  County of Residence: [place]  What city do you live in?: [place]  Home entry access options. Select all that apply.:  Stairs  Number of steps to enter home.: 5  Do the steps have railings?: Yes  Type of Current Residence: 2 story home  Upon entering residence, is there a bedroom on the main floor (no further steps)?: Yes  Upon entering residence, is there a bathroom on the main floor (no further steps)?: Yes  Living Arrangements: Lives w/ Daughter, Lives w/ Extended Family  Is patient a ?: No    Activities of Daily Living Prior to Admission  Functional Status: Independent  Ambulates independently?: Yes  Does patient use assisted devices?: Yes  Assisted Devices (DME) used: Shower Chair  Does patient currently own DME?: Yes  What DME does the patient currently own?: Shower Chair, Walker  Does patient have a history of Outpatient Therapy (PT/OT)?: No  Does the patient have a history of Short-Term Rehab?: No  Does patient have a history of HHC?: No  Does patient currently have HHC?: No    Patient Information Continued  Income Source: Pension/FPC  Does patient have prescription coverage?: Yes  Does patient receive dialysis treatments?: No  Does patient have a history of substance abuse?: No  Does patient have a history of Mental Health Diagnosis?: No    Means of Transportation  Means of Transport to Appts:: Family transport      Social Determinants of Health (SDOH)      Flowsheet Row Most Recent Value   Housing Stability    In the last 12 months, was there a time when you were not able to pay the mortgage or rent on time? N   In the past 12 months, how many times have you moved where you were living? 0   At any time in the past 12 months, were you homeless or living in a shelter (including now)? N   Transportation Needs    In the past 12 months, has lack of transportation kept you from medical appointments or from getting medications? no   In the past 12 months, has lack of transportation kept you from meetings, work, or from getting things needed for daily living? No   Food Insecurity    Within the past 12 months, you  worried that your food would run out before you got the money to buy more. Never true   Within the past 12 months, the food you bought just didn't last and you didn't have money to get more. Never true   Utilities    In the past 12 months has the electric, gas, oil, or water company threatened to shut off services in your home? No            DISCHARGE DETAILS:    Discharge planning discussed with:: patient's daughter over the phone  Freedom of Choice: Yes  Comments - Freedom of Choice: CM maintained freedom of choice as it pertains to discharge planning. CM attempted to meet with patient for assessment and discharge planning, patient reporting inability to hear and agreeable to Cm discussing with her daughter over the phone instead. Patient's daughter reports patient lives with her, and her  and son, there is always someone home with patient. Patient's daughter denies need for HHC and reports her mother would no be agreeable. She reports plan for her mother to return home at discharge. She reports patient owns a walker but does not like to use it, she uses a shower chair, she had bottom dentures and hearing aids but the patient refused to use them and threw them away, she denied other DME needs.  Patient's daughter reports that she drives patient to all appointments and would transport patient home from the hospital at discharge. No CM/ discharge needs anticipated.  CM contacted family/caregiver?: Yes  Were Treatment Team discharge recommendations reviewed with patient/caregiver?: Yes  Did patient/caregiver verbalize understanding of patient care needs?: Yes  Were patient/caregiver advised of the risks associated with not following Treatment Team discharge recommendations?: Yes    Contacts  Patient Contacts: Marsha (Daughter)  [phone]  Relationship to Patient:: Family  Contact Method: In Person  Reason/Outcome: Continuity of Care, Emergency Contact, Discharge Planning    Requested Home Health Care          Is the patient interested in HHC at discharge?: No    DME Referral Provided  Referral made for DME?: No    Other Referral/Resources/Interventions Provided:  Interventions: Declines resources    Would you like to participate in our [facility] service program?  : No - Declined    Treatment Team Recommendation: Home  Discharge Destination Plan:: Home  Transport at Discharge : Family

## 2024-06-18 NOTE — ASSESSMENT & PLAN NOTE
Blood Pressure: (!) 172/61   Blood pressure controlled  Resume home dose of Cozaar, Lopressor.  BP PRN meds

## 2024-06-18 NOTE — DISCHARGE SUMMARY
[facility]   Discharge - Name: Violet Dick I  MRN: [id number]  Unit/Bed#: -01 I Date of Admission: 6/16/2024   Date of Service: 6/18/2024 I Hospital Day: 0      Principal Problem:    Acute diverticulitis  Active Problems:    HTN (hypertension)    HLD (hyperlipidemia)    DM (diabetes mellitus) (HCC)      Medical Problems       Resolved Problems  Date Reviewed: 6/17/2024   None         Discharging Physician / Practitioner: Leo Islas DO  PCP: Bela Kate  Admission Date:   Admission Orders (From admission, onward)       Ordered        06/18/24 0929  INPATIENT ADMISSION  Once            06/16/24 1857  Place in Observation  Once                          Discharge Date: 06/18/24    Consultations During Hospital Stay:  None    Procedures Performed:   None    Significant Findings / Test Results:   CT abdomen pelvis with contrast    Result Date: 6/16/2024  Impression: Acute sigmoid diverticulitis. No complication. Bladder wall thickening with mucosal hyperenhancement consistent with cystitis. Gallstones without surrounding inflammation. Workstation performed: [id number]       No Chest XR results available for this patient.   No results found for this or any previous visit.      Recent Labs     06/16/24  2314   URINECX Culture too young- will reincubate       Incidental Findings:   None other than noted above; I reviewed the above mentioned incidental findings with the patient and/or family and they expressed understanding.    Test Results Pending at Discharge (will require follow up):   None     Outpatient Tests Requested:  None    Complications:  None    Reason for Admission:   Chief Complaint   Patient presents with    Diarrhea     Daughter reports uncontrollable diarrhea since yesterday causing incontinence. Not eating/drinking.          Hospital Course:   Violet Dick is a 84 y.o. female patient who originally presented to the hospital on 6/16/2024 due to Diarrhea (Daughter  "reports uncontrollable diarrhea since yesterday causing incontinence. Not eating/drinking. )    Review of chart showed patient  has a past medical history of Cancer (HCC), Diabetes mellitus (HCC), [place] (hard of hearing), and Hypertension.    Patient's daughter said that patient was unable to drink plenty of water secondary to diarrhea and she was concerned of dehydration.  CT scan showed acute diverticulitis of the sigmoid colon.  Patient has been treated with IV ceftriaxone and Flagyl.  Her diarrhea has resolved completely.  She denies any GI symptoms.  There is no abdominal pain or tenderness.  No SIRS criteria.  Patient wants to go home.  Considering that this is uncomplicated diverticulitis which has resulted symptoms after 1 dose of antibiotics, patient is safe for discharge home with close follow-up outpatient with PCP.  She will be discharged home on Cipro and Flagyl.  Patient and daughter have been instructed to hydrate plenty and eat a low fiber diet.   On 6/17, patient was scheduled to be discharged but subsequently reported having persistent diarrhea that was not noted by nursing staff.    Today, patient no longer having any further episodes of diarrhea and has a ride home.  Family at bedside.  They are in complete agreement with the discharge plan.  All their questions have been answered to satisfaction.    Please see above list of diagnoses and related plan for additional information.     Condition at Discharge: stable    Discharge Day Visit / Exam:   Subjective: Offers no other complaints at this time.  Eager for discharge.  All questions answered  Vitals: Blood Pressure: (!) 172/61 (06/18/24 0629)  Pulse: 95 (06/16/24 2246)  Temperature: 97.9 °F (36.6 °C) (06/18/24 0629)  Temp Source: Oral (06/16/24 2037)  Respirations: 16 (06/17/24 2156)  Height: 4' 11\" (149.9 cm) (06/16/24 2037)  Weight - Scale: 68.5 kg (151 lb 0.2 oz) (06/16/24 2037)  SpO2: 97 % (06/16/24 2246)  Exam:   Physical Exam  Vitals and " nursing note reviewed.   Constitutional:       General: She is not in acute distress.     Appearance: She is ill-appearing. She is not toxic-appearing.   HENT:      Head: Normocephalic.      Nose: Nose normal.      Mouth/Throat:      Mouth: Mucous membranes are dry.   Eyes:      General: No scleral icterus.     Conjunctiva/sclera: Conjunctivae normal.   Cardiovascular:      Rate and Rhythm: Normal rate.      Pulses: Normal pulses.           Radial pulses are 2+ on the right side and 2+ on the left side.      Heart sounds: Normal heart sounds.   Pulmonary:      Effort: Pulmonary effort is normal.      Breath sounds: Normal breath sounds.   Abdominal:      General: Bowel sounds are normal. There is no distension.      Palpations: Abdomen is soft.      Tenderness: There is no abdominal tenderness.   Musculoskeletal:         General: No tenderness.   Skin:     General: Skin is dry.      Coloration: Skin is not jaundiced.   Neurological:      Mental Status: She is alert.   Psychiatric:         Mood and Affect: Mood normal.         Behavior: Behavior normal. Behavior is cooperative.          Discussion with Family: Updated  (son) at bedside.    Discharge instructions/Information to patient and family:   See after visit summary for information provided to patient and family.      Provisions for Follow-Up Care:  See after visit summary for information related to follow-up care and any pertinent home health orders.       Mobility at time of Discharge:   Basic Mobility Inpatient Raw Score: 23  JH-HLM Goal: 7: Walk 25 feet or more  JH-HLM Achieved: 7: Walk 25 feet or more  HLM Goal achieved. Continue to encourage appropriate mobility.    Disposition:   Home    Planned Readmission: none     Discharge Statement:  I spent 42 minutes discharging the patient. This time was spent on the day of discharge. I had direct contact with the patient on the day of discharge. Greater than 50% of the total time was spent  examining patient, answering all patient questions, arranging and discussing plan of care with patient as well as directly providing post-discharge instructions.  Additional time then spent on discharge activities.    Discharge Medications:  See after visit summary for reconciled discharge medications provided to patient and/or family.      **Please Note: This note may have been constructed using a voice recognition system**

## 2024-06-18 NOTE — PLAN OF CARE
Problem: Nutrition/Hydration-ADULT  Goal: Nutrient/Hydration intake appropriate for improving, restoring or maintaining nutritional needs  Description: Monitor and assess patient's nutrition/hydration status for malnutrition. Collaborate with interdisciplinary team and initiate plan and interventions as ordered.  Monitor patient's weight and dietary intake as ordered or per policy. Utilize nutrition screening tool and intervene as necessary. Determine patient's food preferences and provide high-protein, high-caloric foods as appropriate.     INTERVENTIONS:  - Monitor oral intake, urinary output, labs, and treatment plans  - Assess nutrition and hydration status and recommend course of action  - Evaluate amount of meals eaten  - Assist patient with eating if necessary   - Allow adequate time for meals  - Recommend/ encourage appropriate diets, oral nutritional supplements, and vitamin/mineral supplements  - Order, calculate, and assess calorie counts as needed  - Recommend, monitor, and adjust tube feedings and TPN/PPN based on assessed needs  - Assess need for intravenous fluids  - Provide specific nutrition/hydration education as appropriate  - Include patient/family/caregiver in decisions related to nutrition  Outcome: Progressing     Problem: PAIN - ADULT  Goal: Verbalizes/displays adequate comfort level or baseline comfort level  Description: Interventions:  - Encourage patient to monitor pain and request assistance  - Assess pain using appropriate pain scale  - Administer analgesics based on type and severity of pain and evaluate response  - Implement non-pharmacological measures as appropriate and evaluate response  - Consider cultural and social influences on pain and pain management  - Notify physician/advanced practitioner if interventions unsuccessful or patient reports new pain  Outcome: Progressing     Problem: INFECTION - ADULT  Goal: Absence or prevention of progression during  hospitalization  Description: INTERVENTIONS:  - Assess and monitor for signs and symptoms of infection  - Monitor lab/diagnostic results  - Monitor all insertion sites, i.e. indwelling lines, tubes, and drains  - Monitor endotracheal if appropriate and nasal secretions for changes in amount and color  - [place] appropriate cooling/warming therapies per order  - Administer medications as ordered  - Instruct and encourage patient and family to use good hand hygiene technique  - Identify and instruct in appropriate isolation precautions for identified infection/condition  Outcome: Progressing     Problem: SAFETY ADULT  Goal: Patient will remain free of falls  Description: INTERVENTIONS:  - Educate patient/family on patient safety including physical limitations  - Instruct patient to call for assistance with activity   - Consult OT/PT to assist with strengthening/mobility   - Keep Call bell within reach  - Keep bed low and locked with side rails adjusted as appropriate  - Keep care items and personal belongings within reach  - Initiate and maintain comfort rounds  - Make Fall Risk Sign visible to staff  - Apply yellow socks and bracelet for high fall risk patients  - Consider moving patient to room near nurses station  Outcome: Progressing  Goal: Maintain or return to baseline ADL function  Description: INTERVENTIONS:  -  Assess patient's ability to carry out ADLs; assess patient's baseline for ADL function and identify physical deficits which impact ability to perform ADLs (bathing, care of mouth/teeth, toileting, grooming, dressing, etc.)  - Assess/evaluate cause of self-care deficits   - Assess range of motion  - Assess patient's mobility; develop plan if impaired  - Assess patient's need for assistive devices and provide as appropriate  - Encourage maximum independence but intervene and supervise when necessary  - Involve family in performance of ADLs  - Assess for home care needs following discharge   - Consider  OT consult to assist with ADL evaluation and planning for discharge  - Provide patient education as appropriate  Outcome: Progressing  Goal: Maintains/Returns to pre admission functional level  Description: INTERVENTIONS:  - Perform AM-PAC 6 Click Basic Mobility/ Daily Activity assessment daily.  - Set and communicate daily mobility goal to care team and patient/family/caregiver.   - Collaborate with rehabilitation services on mobility goals if consulted  - Out of bed for toileting  - Record patient progress and toleration of activity level   Outcome: Progressing     Problem: DISCHARGE PLANNING  Goal: Discharge to home or other facility with appropriate resources  Description: INTERVENTIONS:  - Identify barriers to discharge w/patient and caregiver  - Arrange for needed discharge resources and transportation as appropriate  - Identify discharge learning needs (meds, wound care, etc.)  - Arrange for interpretive services to assist at discharge as needed  - Refer to Case Management Department for coordinating discharge planning if the patient needs post-hospital services based on physician/advanced practitioner order or complex needs related to functional status, cognitive ability, or social support system  Outcome: Progressing     Problem: Knowledge Deficit  Goal: Patient/family/caregiver demonstrates understanding of disease process, treatment plan, medications, and discharge instructions  Description: Complete learning assessment and assess knowledge base.  Interventions:  - Provide teaching at level of understanding  - Provide teaching via preferred learning methods  Outcome: Progressing

## 2024-06-19 LAB — BACTERIA UR CULT: NORMAL

## 2024-06-21 ENCOUNTER — APPOINTMENT (OUTPATIENT)
Dept: LAB | Facility: HOSPITAL | Age: 84
End: 2024-06-21

## 2024-06-21 ENCOUNTER — OFFICE VISIT (OUTPATIENT)
Age: 84
End: 2024-06-21
Payer: MEDICARE

## 2024-06-21 ENCOUNTER — PREP FOR PROCEDURE (OUTPATIENT)
Age: 84
End: 2024-06-21

## 2024-06-21 VITALS
DIASTOLIC BLOOD PRESSURE: 78 MMHG | SYSTOLIC BLOOD PRESSURE: 158 MMHG | WEIGHT: 145 LBS | HEART RATE: 105 BPM | HEIGHT: 59 IN | BODY MASS INDEX: 29.23 KG/M2 | OXYGEN SATURATION: 99 %

## 2024-06-21 DIAGNOSIS — K57.92 ACUTE DIVERTICULITIS: ICD-10-CM

## 2024-06-21 DIAGNOSIS — R19.7 DIARRHEA, UNSPECIFIED TYPE: Primary | ICD-10-CM

## 2024-06-21 PROCEDURE — 99204 OFFICE O/P NEW MOD 45 MIN: CPT | Performed by: PHYSICIAN ASSISTANT

## 2024-06-21 RX ORDER — METOPROLOL SUCCINATE 25 MG/1
25 TABLET, EXTENDED RELEASE ORAL DAILY
COMMUNITY
Start: 2024-05-09

## 2024-06-21 NOTE — PATIENT INSTRUCTIONS
Scheduled date of colonoscopy (as of today): 8/5/24  Physician performing colonoscopy: Monica  Location of colonoscopy: [facility]  Bowel prep reviewed with patient: Miralax  Instructions reviewed with patient by: Ashlyn LLAMAS  Clearances:

## 2024-06-21 NOTE — PROGRESS NOTES
[facility] - Outpatient Consultation  Violet Dick 84 y.o. female MRN: [id number]  Encounter: [id number]          ASSESSMENT AND PLAN:      1. Acute diverticulitis  2. Diarrhea    Patient presents for follow up from her recent hospital admission for acute diverticulitis.  Patient was admitted 6/16-6/18.  CT Scan A/P showed acute diverticulitis without complications.  She was treated with IV antibiotics (Ceftriaxone and Flagyl) while admitted and transitioned to a Cipro/Flagyl course upon discharge.  Her daughter reports she was only having diarrhea; there was no abdominal pain.  She has also had increased BM irregularities more recently as well prior to this episode.  She reports her last colonoscopy was about 7 years ago but incomplete at that time.  She has a history of cervical cancer s/p chemo/radiation years ago.    She will complete the Cipro/Flagyl course.    Will check stool cultures for c diff, enteric pathogens, and giardia.  Low residue diet x 2 weeks and then increase to a high fiber diet.  Will plan for colonoscopy in 6-8 weeks to rule out an underlying malignancy.   ______________________________________________________________________    HPI:  Patient is an 84 year old female with a PMH of cervical cancer, DM, HTN, hearing impairment who presents to the office for follow up from her recent hospital admission for acute diverticulitis. Patient was admitted 6/16-6/18.  CT Scan A/P showed acute diverticulitis without complications. She was treated with IV antibiotics (Ceftriaxone and Flagyl) while admitted and transitioned to a Cipro/Flagyl course upon discharge. Her daughter reports she was only having diarrhea; there was no abdominal pain. She has also had increased BM irregularities more recently as well prior to this episode.  She reports her last colonoscopy was about 7 years ago but incomplete at that time. She has a history of cervical cancer s/p chemo/radiation  years ago. No family history of colon cancer.      REVIEW OF SYSTEMS:    CONSTITUTIONAL: Denies any fever, chills, rigors, and weight loss.  HEENT: No earache or tinnitus. Denies hearing loss or visual disturbances.  CARDIOVASCULAR: No chest pain or palpitations.   RESPIRATORY: Denies any cough, hemoptysis, shortness of breath or dyspnea on exertion.  GASTROINTESTINAL: As noted in the History of Present Illness.   GENITOURINARY: No problems with urination. Denies any hematuria or dysuria.  NEUROLOGIC: No dizziness or vertigo, denies headaches.   MUSCULOSKELETAL: Denies any muscle or joint pain.   SKIN: Denies skin rashes or itching.   ENDOCRINE: Denies excessive thirst. Denies intolerance to heat or cold.  PSYCHOSOCIAL: Denies depression or anxiety. Denies any recent memory loss.       Historical Information   Past Medical History:   Diagnosis Date    Cancer (HCC)     cervical 10 years ago- chemo radiation    Diabetes mellitus (HCC)     [place] (hard of hearing)     Hypertension      Past Surgical History:   Procedure Laterality Date    HYSTERECTOMY      partial    JOINT REPLACEMENT      foot and elbow     Social History   Social History     Substance and Sexual Activity   Alcohol Use Never     Social History     Substance and Sexual Activity   Drug Use Never     Social History     Tobacco Use   Smoking Status Never   Smokeless Tobacco Never     No family history on file.    Meds/Allergies       Current Outpatient Medications:     aspirin 81 mg chewable tablet    atorvastatin (LIPITOR) 20 mg tablet    ciprofloxacin (CIPRO) 500 mg tablet    Empagliflozin (Jardiance) 25 MG TABS    Empagliflozin 25 MG TABS    losartan (COZAAR) 25 mg tablet    metoprolol tartrate (LOPRESSOR) 25 mg tablet    metroNIDAZOLE (FLAGYL) 500 mg tablet    No Known Allergies        Objective     There were no vitals taken for this visit. There is no height or weight on file to calculate BMI.        PHYSICAL EXAM:      General Appearance:   Alert,  cooperative, no distress   HEENT:   Normocephalic, atraumatic, anicteric     Neck:  Supple, symmetrical, trachea midline   Lungs:   Clear to auscultation bilaterally; no rales, rhonchi or wheezing; respirations unlabored    Heart::   Regular rate and rhythm; no murmur, rub, or gallop.   Abdomen:   Soft, non-tender, non-distended; normal bowel sounds; no masses, no organomegaly    Genitalia:   Deferred    Rectal:   Deferred    Extremities:  No cyanosis, clubbing or edema    Pulses:  2+ and symmetric    Skin:  No jaundice, rashes, or lesions    Lymph nodes:  No palpable cervical lymphadenopathy        Lab Results:   No visits with results within 1 Day(s) from this visit.   Latest known visit with results is:   Admission on 06/16/2024, Discharged on 06/18/2024   Component Date Value    WBC 06/16/2024 9.94     RBC 06/16/2024 5.05     Hemoglobin 06/16/2024 14.1     Hematocrit 06/16/2024 42.6     MCV 06/16/2024 84     MCH 06/16/2024 27.9     MCHC 06/16/2024 33.1     RDW 06/16/2024 14.9     MPV 06/16/2024 8.5 (L)     Platelets 06/16/2024 260     nRBC 06/16/2024 0     Segmented % 06/16/2024 72     Immature Grans % 06/16/2024 1     Lymphocytes % 06/16/2024 17     Monocytes % 06/16/2024 8     Eosinophils Relative 06/16/2024 2     Basophils Relative 06/16/2024 0     Absolute Neutrophils 06/16/2024 7.12     Absolute Immature Grans 06/16/2024 0.06     Absolute Lymphocytes 06/16/2024 1.73     Absolute Monocytes 06/16/2024 0.77     Eosinophils Absolute 06/16/2024 0.22     Basophils Absolute 06/16/2024 0.04     Sodium 06/16/2024 136     Potassium 06/16/2024 3.6     Chloride 06/16/2024 102     CO2 06/16/2024 23     ANION GAP 06/16/2024 11     BUN 06/16/2024 16     Creatinine 06/16/2024 0.85     Glucose 06/16/2024 145 (H)     Calcium 06/16/2024 8.5     AST 06/16/2024 11 (L)     ALT 06/16/2024 9     Alkaline Phosphatase 06/16/2024 79     Total Protein 06/16/2024 6.8     Albumin 06/16/2024 3.8     Total Bilirubin 06/16/2024 0.68      eGFR 06/16/2024 63     LACTIC ACID 06/16/2024 3.2 (H)     Lipase 06/16/2024 17     SARS-CoV-2 06/16/2024 Negative     INFLUENZA A PCR 06/16/2024 Negative     INFLUENZA B PCR 06/16/2024 Negative     RSV PCR 06/16/2024 Negative     LACTIC ACID 06/16/2024 2.4 (H)     Color, UA 06/16/2024 Colorless     Clarity, UA 06/16/2024 Clear     Specific Gravity, UA 06/16/2024 1.034 (H)     pH, UA 06/16/2024 5.0     Leukocytes, UA 06/16/2024 Large (A)     Nitrite, UA 06/16/2024 Negative     Protein, UA 06/16/2024 Negative     Glucose, UA 06/16/2024 >=1000 (1%) (A)     Ketones, UA 06/16/2024 Negative     Urobilinogen, UA 06/16/2024 <2.0     Bilirubin, UA 06/16/2024 Negative     Occult Blood, UA 06/16/2024 Negative     Hemoglobin A1C 06/16/2024 6.6 (H)     EAG 06/16/2024 143     RBC, UA 06/16/2024 30-50 (A)     WBC, UA 06/16/2024 Innumerable (A)     Epithelial Cells 06/16/2024 Occasional     Bacteria, UA 06/16/2024 Occasional     Budding Yeast 06/16/2024 Present     Urine Culture 06/16/2024 >100,000 cfu/ml     POC Glucose 06/16/2024 161 (H)     POC Glucose 06/17/2024 102     LACTIC ACID 06/17/2024 1.5     Sodium 06/17/2024 137     Potassium 06/17/2024 3.7     Chloride 06/17/2024 102     CO2 06/17/2024 28     ANION GAP 06/17/2024 7     BUN 06/17/2024 13     Creatinine 06/17/2024 0.84     Glucose 06/17/2024 123     Calcium 06/17/2024 8.4     eGFR 06/17/2024 64     WBC 06/17/2024 11.30 (H)     RBC 06/17/2024 4.80     Hemoglobin 06/17/2024 13.2     Hematocrit 06/17/2024 40.9     MCV 06/17/2024 85     MCH 06/17/2024 27.5     MCHC 06/17/2024 32.3     RDW 06/17/2024 15.1     Platelets 06/17/2024 267     MPV 06/17/2024 9.8     POC Glucose 06/17/2024 136     POC Glucose 06/17/2024 132     POC Glucose 06/17/2024 149 (H)     POC Glucose 06/18/2024 102          Radiology Results:   CT abdomen pelvis with contrast    Result Date: 6/16/2024  Narrative: CT ABDOMEN AND PELVIS WITH IV CONTRAST INDICATION: watery diarrhea. COMPARISON: None.  TECHNIQUE: CT examination of the abdomen and pelvis was performed. Multiplanar 2D reformatted images were created from the source data. This examination, like all CT scans performed in the [facility], was performed utilizing techniques to minimize radiation dose exposure, including the use of iterative reconstruction and automated exposure control. Radiation dose length product (DLP) for this visit: 622 mGy-cm IV Contrast: 100 mL of iohexol (OMNIPAQUE) Enteric Contrast: Not administered. FINDINGS: ABDOMEN LOWER CHEST: Bibasilar dependent atelectasis. No pericardial or pleural effusion. LIVER/BILIARY TREE: Unremarkable. GALLBLADDER: Cholelithiasis without findings of acute cholecystitis. SPLEEN: Unremarkable. PANCREAS: Unremarkable. ADRENAL GLANDS: Unremarkable. KIDNEYS/URETERS: Unremarkable. No hydronephrosis. STOMACH AND BOWEL: Colonic diverticulosis with sigmoid colon wall thickening as seen on image 2/143 consistent with acute diverticulitis. APPENDIX: No findings to suggest appendicitis. ABDOMINOPELVIC CAVITY: No ascites. No pneumoperitoneum. No lymphadenopathy. VESSELS: Atherosclerosis without abdominal aortic aneurysm. PELVIS REPRODUCTIVE ORGANS: Unremarkable for patient's age. URINARY BLADDER: Bladder wall thickening with mucosal hyperenhancement suggesting cystitis. ABDOMINAL WALL/INGUINAL REGIONS: Left lower quadrant subcutaneous cyst likely a sebaceous cyst measuring 1.7 cm. BONES: Degenerative changes of the spine. Anterolisthesis of L4 and L5.     Impression: Acute sigmoid diverticulitis. No complication. Bladder wall thickening with mucosal hyperenhancement consistent with cystitis. Gallstones without surrounding inflammation. Workstation performed: [id number]

## 2024-06-22 ENCOUNTER — APPOINTMENT (OUTPATIENT)
Dept: LAB | Facility: HOSPITAL | Age: 84
End: 2024-06-22
Payer: MEDICARE

## 2024-06-22 DIAGNOSIS — R19.7 DIARRHEA, UNSPECIFIED TYPE: ICD-10-CM

## 2024-06-22 PROCEDURE — 87505 NFCT AGENT DETECTION GI: CPT

## 2024-06-23 LAB
C COLI+JEJUNI TUF STL QL NAA+PROBE: NEGATIVE
C DIFF TOX GENS STL QL NAA+PROBE: NEGATIVE
EC STX1+STX2 GENES STL QL NAA+PROBE: NEGATIVE
SALMONELLA SP SPAO STL QL NAA+PROBE: NEGATIVE
SHIGELLA SP+EIEC IPAH STL QL NAA+PROBE: NEGATIVE

## 2024-06-25 LAB — G LAMBLIA AG STL QL IA: NEGATIVE

## 2024-07-01 ENCOUNTER — NURSE TRIAGE (OUTPATIENT)
Age: 84
End: 2024-07-01

## 2024-07-01 NOTE — TELEPHONE ENCOUNTER
"Last OV 6/21/24 with Arabella Hancock PA-C for Diverticulitis and Diarrhea  Stool studies 6/22/24  Colonoscopy scheduled 8/5/24    Daughter Marsha (on Communication Consent form) called.   Pt finished ABX Cipro/Flagyl; diarrhea has returned. Watery stools;  2-7 BM's day.     Denies abdominal pain, nausea, vomiting,fever, or blood in stools.    Pt is drinking,water, orange juice, ensure protien drinks. Per Marsha, pt eats very little and tends to eat a lot of junk food. Started Metamucil.    Daughter hesitant to try OTC for diarrhea without provider's input.  Advised hydration with electrolytes in the interim. She verbalized understanding.    Please advise.    Marsha [phone]    Reason for Disposition  • MILD-MODERATE diarrhea (e.g., 1-6 times / day more than normal)    Answer Assessment - Initial Assessment Questions  1. DIARRHEA SEVERITY: \"How bad is the diarrhea?\" \"How many extra stools have you had in the past 24 hours than normal?\"     - NO DIARRHEA (SCALE 0)    - MILD (SCALE 1-3): Few loose or mushy BMs; increase of 1-3 stools over normal daily number of stools; mild increase in ostomy output.    -  MODERATE (SCALE 4-7): Increase of 4-6 stools daily over normal; moderate increase in ostomy output.  * SEVERE (SCALE 8-10; OR 'WORST POSSIBLE'): Increase of 7 or more stools daily over normal; moderate increase in ostomy output; incontinence.      Mild to moderate   2. ONSET: \"When did the diarrhea begin?\"       After ABX  3. BM CONSISTENCY: \"How loose or watery is the diarrhea?\"       watery  4. VOMITING: \"Are you also vomiting?\" If Yes, ask: \"How many times in the past 24 hours?\"       Denies  5. ABDOMINAL PAIN: \"Are you having any abdominal pain?\" If Yes, ask: \"What does it feel like?\" (e.g., crampy, dull, intermittent, constant)       Denies  6. ABDOMINAL PAIN SEVERITY: If present, ask: \"How bad is the pain?\"  (e.g., Scale 1-10; mild, moderate, or severe)    - MILD (1-3): doesn't interfere with normal " "activities, abdomen soft and not tender to touch     - MODERATE (4-7): interferes with normal activities or awakens from sleep, tender to touch     - SEVERE (8-10): excruciating pain, doubled over, unable to do any normal activities        NA  7. ORAL INTAKE: If vomiting, \"Have you been able to drink liquids?\" \"How much fluids have you had in the past 24 hours?\"      Drinking   8. HYDRATION: \"Any signs of dehydration?\" (e.g., dry mouth [not just dry lips], too weak to stand, dizziness, new weight loss) \"When did you last urinate?\"      Water   9. EXPOSURE: \"Have you traveled to a foreign country recently?\" \"Have you been exposed to anyone with diarrhea?\" \"Could you have eaten any food that was spoiled?\"      denies  10. ANTIBIOTIC USE: \"Are you taking antibiotics now or have you taken antibiotics in the past 2 months?\"        Yes   11. OTHER SYMPTOMS: \"Do you have any other symptoms?\" (e.g., fever, blood in stool)        Denies    Protocols used: Diarrhea-ADULT-[place]    "

## 2024-07-02 ENCOUNTER — HOSPITAL ENCOUNTER (EMERGENCY)
Facility: HOSPITAL | Age: 84
Discharge: HOME | End: 2024-07-02
Attending: EMERGENCY MEDICINE
Payer: MEDICARE

## 2024-07-02 ENCOUNTER — APPOINTMENT (EMERGENCY)
Dept: CT IMAGING | Facility: HOSPITAL | Age: 84
End: 2024-07-02
Payer: MEDICARE

## 2024-07-02 VITALS
OXYGEN SATURATION: 97 % | DIASTOLIC BLOOD PRESSURE: 90 MMHG | RESPIRATION RATE: 20 BRPM | TEMPERATURE: 97.7 F | SYSTOLIC BLOOD PRESSURE: 213 MMHG | HEART RATE: 87 BPM

## 2024-07-02 DIAGNOSIS — R19.7 DIARRHEA, UNSPECIFIED TYPE: Primary | ICD-10-CM

## 2024-07-02 DIAGNOSIS — R19.7 DIARRHEA: Primary | ICD-10-CM

## 2024-07-02 LAB
ALBUMIN SERPL BCG-MCNC: 3.8 G/DL (ref 3.5–5)
ALP SERPL-CCNC: 64 U/L (ref 34–104)
ALT SERPL W P-5'-P-CCNC: 9 U/L (ref 7–52)
ANION GAP SERPL CALCULATED.3IONS-SCNC: 9 MMOL/L (ref 4–13)
AST SERPL W P-5'-P-CCNC: 12 U/L (ref 13–39)
BASOPHILS # BLD AUTO: 0.02 THOUSANDS/ÂΜL (ref 0–0.1)
BASOPHILS NFR BLD AUTO: 0 % (ref 0–1)
BILIRUB SERPL-MCNC: 0.36 MG/DL (ref 0.2–1)
BUN SERPL-MCNC: 20 MG/DL (ref 5–25)
CALCIUM SERPL-MCNC: 8.9 MG/DL (ref 8.4–10.2)
CARDIAC TROPONIN I PNL SERPL HS: <2 NG/L
CHLORIDE SERPL-SCNC: 101 MMOL/L (ref 96–108)
CO2 SERPL-SCNC: 29 MMOL/L (ref 21–32)
CREAT SERPL-MCNC: 0.83 MG/DL (ref 0.6–1.3)
EOSINOPHIL # BLD AUTO: 0.21 THOUSAND/ÂΜL (ref 0–0.61)
EOSINOPHIL NFR BLD AUTO: 2 % (ref 0–6)
ERYTHROCYTE [DISTWIDTH] IN BLOOD BY AUTOMATED COUNT: 15.5 % (ref 11.6–15.1)
GFR SERPL CREATININE-BSD FRML MDRD: 64 ML/MIN/1.73SQ M
GLUCOSE SERPL-MCNC: 158 MG/DL (ref 65–140)
HCT VFR BLD AUTO: 45.2 % (ref 34.8–46.1)
HGB BLD-MCNC: 14.4 G/DL (ref 11.5–15.4)
IMM GRANULOCYTES # BLD AUTO: 0.08 THOUSAND/UL (ref 0–0.2)
IMM GRANULOCYTES NFR BLD AUTO: 1 % (ref 0–2)
LACTATE SERPL-SCNC: 1.9 MMOL/L (ref 0.5–2)
LIPASE SERPL-CCNC: 28 U/L (ref 11–82)
LYMPHOCYTES # BLD AUTO: 1.82 THOUSANDS/ÂΜL (ref 0.6–4.47)
LYMPHOCYTES NFR BLD AUTO: 17 % (ref 14–44)
MCH RBC QN AUTO: 26.9 PG (ref 26.8–34.3)
MCHC RBC AUTO-ENTMCNC: 31.9 G/DL (ref 31.4–37.4)
MCV RBC AUTO: 84 FL (ref 82–98)
MONOCYTES # BLD AUTO: 1.01 THOUSAND/ÂΜL (ref 0.17–1.22)
MONOCYTES NFR BLD AUTO: 10 % (ref 4–12)
NEUTROPHILS # BLD AUTO: 7.41 THOUSANDS/ÂΜL (ref 1.85–7.62)
NEUTS SEG NFR BLD AUTO: 70 % (ref 43–75)
NRBC BLD AUTO-RTO: 0 /100 WBCS
PLATELET # BLD AUTO: 326 THOUSANDS/UL (ref 149–390)
PMV BLD AUTO: 8.5 FL (ref 8.9–12.7)
POTASSIUM SERPL-SCNC: 3.6 MMOL/L (ref 3.5–5.3)
PROT SERPL-MCNC: 6.9 G/DL (ref 6.4–8.4)
RBC # BLD AUTO: 5.36 MILLION/UL (ref 3.81–5.12)
SODIUM SERPL-SCNC: 139 MMOL/L (ref 135–147)
WBC # BLD AUTO: 10.55 THOUSAND/UL (ref 4.31–10.16)

## 2024-07-02 PROCEDURE — 84484 ASSAY OF TROPONIN QUANT: CPT | Performed by: EMERGENCY MEDICINE

## 2024-07-02 PROCEDURE — 87177 OVA AND PARASITES SMEARS: CPT | Performed by: EMERGENCY MEDICINE

## 2024-07-02 PROCEDURE — 99285 EMERGENCY DEPT VISIT HI MDM: CPT | Performed by: EMERGENCY MEDICINE

## 2024-07-02 PROCEDURE — 83690 ASSAY OF LIPASE: CPT | Performed by: EMERGENCY MEDICINE

## 2024-07-02 PROCEDURE — 85025 COMPLETE CBC W/AUTO DIFF WBC: CPT | Performed by: EMERGENCY MEDICINE

## 2024-07-02 PROCEDURE — 74177 CT ABD & PELVIS W/CONTRAST: CPT

## 2024-07-02 PROCEDURE — 36415 COLL VENOUS BLD VENIPUNCTURE: CPT | Performed by: EMERGENCY MEDICINE

## 2024-07-02 PROCEDURE — 80053 COMPREHEN METABOLIC PANEL: CPT | Performed by: EMERGENCY MEDICINE

## 2024-07-02 PROCEDURE — 93005 ELECTROCARDIOGRAM TRACING: CPT

## 2024-07-02 PROCEDURE — 87209 SMEAR COMPLEX STAIN: CPT | Performed by: EMERGENCY MEDICINE

## 2024-07-02 PROCEDURE — 83605 ASSAY OF LACTIC ACID: CPT | Performed by: EMERGENCY MEDICINE

## 2024-07-02 PROCEDURE — 87505 NFCT AGENT DETECTION GI: CPT | Performed by: EMERGENCY MEDICINE

## 2024-07-02 RX ORDER — CHOLESTYRAMINE 4 G/9G
1 POWDER, FOR SUSPENSION ORAL 2 TIMES DAILY WITH MEALS
Qty: 60 PACKET | Refills: 1 | Status: SHIPPED | OUTPATIENT
Start: 2024-07-02

## 2024-07-02 RX ORDER — DICYCLOMINE HCL 20 MG
20 TABLET ORAL 2 TIMES DAILY
Qty: 20 TABLET | Refills: 0 | Status: SHIPPED | OUTPATIENT
Start: 2024-07-02

## 2024-07-02 RX ADMIN — IOHEXOL 100 ML: 350 INJECTION, SOLUTION INTRAVENOUS at 16:22

## 2024-07-02 RX ADMIN — SODIUM CHLORIDE 1000 ML: 0.9 INJECTION, SOLUTION INTRAVENOUS at 14:48

## 2024-07-02 NOTE — ED PROVIDER NOTES
"History  Chief Complaint   Patient presents with    Diarrhea     Pt presents to ER from home with daughter for reports of diarrhea - recent admission for diverticulitis, finished PO abx but now has \"non stop constant diarrhea worsening since Sunday.\"      83 y/o female presents to the ED with her daughter for diarrhea. She states that she had diarrhea 2 weeks ago, was seen here at that time and diagnosed with diverticulitis. She was admitted at that time and placed on cipro flagyl. She states that she didn't have any abd pain at that time and that usually her mother does not express when she is in pain. She states that the diarrhea had improved but then returned 3 days ago. She states that she finished the course of abx 1 week ago. She reports 10+ episodes/day. Non bloody. She has been taking metamucil and probiotic without relief. She states that she does have a hx of cervical cancer yrs ago at which time she got chemo and radiation. States that the radiation affected parts of her colon and she has loose stools at baseline.  However, daughter reports that it is usually not this bad. Denies any n/v, fever, cp, sob, abd pain, or urianry symptoms. No other complaints.       History provided by:  Patient  Diarrhea  Quality:  Watery  Severity:  Moderate  Onset quality:  Sudden  Timing:  Constant  Progression:  Worsening  Relieved by:  None tried  Worsened by:  Nothing  Ineffective treatments:  None tried  Associated symptoms: no abdominal pain, no chills, no recent cough, no fever, no headaches, no myalgias and no vomiting    Risk factors: recent antibiotic use        Prior to Admission Medications   Prescriptions Last Dose Informant Patient Reported? Taking?   Empagliflozin (Jardiance) 25 MG TABS  Self Yes No   Sig: Take 25 mg by mouth every evening   Empagliflozin 25 MG TABS  Self Yes No   Sig: Take 25 mg by mouth daily   Patient not taking: Reported on 6/16/2024   aspirin 81 mg chewable tablet  Self Yes No   Sig: " Chew 81 mg daily   atorvastatin (LIPITOR) 20 mg tablet  Self Yes No   Sig: Take 20 mg by mouth daily   cholestyramine (QUESTRAN) 4 g packet   No No   Sig: Take 1 packet (4 g total) by mouth 2 (two) times a day with meals Take other medications at least 2 hours before or 4 hours after the Questran.  Decrease dose if constipation occurs.   losartan (COZAAR) 25 mg tablet  Self Yes No   Sig: Take 25 mg by mouth daily   metoprolol succinate (TOPROL-XL) 25 mg 24 hr tablet  Self Yes No   Sig: Take 25 mg by mouth daily   metoprolol tartrate (LOPRESSOR) 25 mg tablet  Self Yes No   Sig: Take 25 mg by mouth daily   Patient not taking: Reported on 6/21/2024      Facility-Administered Medications: None       Past Medical History:   Diagnosis Date    Cancer (HCC)     cervical 10 years ago- chemo radiation    Diabetes mellitus (HCC)     Diverticulitis     [place] (hard of hearing)     Hypertension        Past Surgical History:   Procedure Laterality Date    HYSTERECTOMY      partial    JOINT REPLACEMENT      foot and elbow       History reviewed. No pertinent family history.  I have reviewed and agree with the history as documented.    E-Cigarette/Vaping    E-Cigarette Use Never User      E-Cigarette/Vaping Substances     Social History     Tobacco Use    Smoking status: Never    Smokeless tobacco: Never   Vaping Use    Vaping status: Never Used   Substance Use Topics    Alcohol use: Never    Drug use: Never       Review of Systems   Constitutional:  Negative for chills and fever.   HENT:  Negative for congestion, ear pain and sore throat.    Eyes:  Negative for pain and visual disturbance.   Respiratory:  Negative for cough, shortness of breath and wheezing.    Cardiovascular:  Negative for chest pain and leg swelling.   Gastrointestinal:  Positive for diarrhea. Negative for abdominal pain, nausea and vomiting.   Genitourinary:  Negative for dysuria, frequency, hematuria and urgency.   Musculoskeletal:  Negative for myalgias, neck  pain and neck stiffness.   Skin:  Negative for rash and wound.   Neurological:  Negative for weakness, numbness and headaches.   Psychiatric/Behavioral:  Negative for agitation and confusion.    All other systems reviewed and are negative.      Physical Exam  Physical Exam  Vitals and nursing note reviewed.   Constitutional:       Appearance: She is well-developed.   HENT:      Head: Normocephalic and atraumatic.   Eyes:      Pupils: Pupils are equal, round, and reactive to light.   Cardiovascular:      Rate and Rhythm: Normal rate and regular rhythm.   Pulmonary:      Effort: Pulmonary effort is normal.      Breath sounds: Normal breath sounds.   Abdominal:      General: Bowel sounds are normal.      Palpations: Abdomen is soft.      Tenderness: There is no abdominal tenderness.   Musculoskeletal:         General: Normal range of motion.      Cervical back: Normal range of motion and neck supple.   Skin:     General: Skin is warm and dry.   Neurological:      General: No focal deficit present.      Mental Status: She is alert and oriented to person, place, and time.      Comments: No focal deficits         Vital Signs  ED Triage Vitals [07/02/24 1410]   Temperature Pulse Respirations Blood Pressure SpO2   97.7 °F (36.5 °C) (!) 114 20 (!) 180/81 98 %      Temp Source Heart Rate Source Patient Position - Orthostatic VS BP Location FiO2 (%)   Oral Monitor Sitting Right arm --      Pain Score       No Pain           Vitals:    07/02/24 1410 07/02/24 1712   BP: (!) 180/81 (!) 213/90   Pulse: (!) 114 87   Patient Position - Orthostatic VS: Sitting Lying         Visual Acuity      ED Medications  Medications   sodium chloride 0.9 % bolus 1,000 mL (0 mL Intravenous Stopped 7/2/24 1648)   iohexol (OMNIPAQUE) 350 MG/ML injection (MULTI-DOSE) 100 mL (100 mL Intravenous Given 7/2/24 1622)       Diagnostic Studies  Results Reviewed       Procedure Component Value Units Date/Time    Clostridium difficile toxin by PCR with EIA  [[id number]] Collected: 07/02/24 1643    Lab Status: In process Specimen: Stool from Per Rectum Updated: 07/02/24 1713    Stool Enteric Bacterial Panel by PCR [[id number]] Collected: 07/02/24 1643    Lab Status: In process Specimen: Stool from Rectum Updated: 07/02/24 1706    Ova and parasite examination [[id number]] Collected: 07/02/24 1643    Lab Status: In process Specimen: Stool from Rectum Updated: 07/02/24 1651    Lactic acid, plasma (w/reflex if result > 2.0) [[id number]]  (Normal) Collected: 07/02/24 1447    Lab Status: Final result Specimen: Blood from Arm, Left Updated: 07/02/24 1526     LACTIC ACID 1.9 mmol/L     Narrative:      Result may be elevated if tourniquet was used during collection.    HS Troponin 0hr (reflex protocol) [[id number]]  (Normal) Collected: 07/02/24 1447    Lab Status: Final result Specimen: Blood from Arm, Left Updated: 07/02/24 1520     hs TnI 0hr <2 ng/L     Comprehensive metabolic panel [[id number]]  (Abnormal) Collected: 07/02/24 1447    Lab Status: Final result Specimen: Blood from Arm, Left Updated: 07/02/24 1518     Sodium 139 mmol/L      Potassium 3.6 mmol/L      Chloride 101 mmol/L      CO2 29 mmol/L      ANION GAP 9 mmol/L      BUN 20 mg/dL      Creatinine 0.83 mg/dL      Glucose 158 mg/dL      Calcium 8.9 mg/dL      AST 12 U/L      ALT 9 U/L      Alkaline Phosphatase 64 U/L      Total Protein 6.9 g/dL      Albumin 3.8 g/dL      Total Bilirubin 0.36 mg/dL      eGFR 64 ml/min/1.73sq m     Narrative:      National Kid[employer] Foundation guidelines for Chronic Kidney Disease (CKD):     Stage 1 with normal or high GFR (GFR > 90 mL/min/1.73 square meters)    Stage 2 Mild CKD (GFR = 60-89 mL/min/1.73 square meters)    Stage 3A Moderate CKD (GFR = 45-59 mL/min/1.73 square meters)    Stage 3B Moderate CKD (GFR = 30-44 mL/min/1.73 square meters)    Stage 4 Severe CKD (GFR = 15-29 mL/min/1.73 square meters)    Stage 5 End Stage CKD (GFR <15 mL/min/1.73 square meters)  Note: GFR  calculation is accurate only with a steady state creatinine    Lipase [[id number]]  (Normal) Collected: 07/02/24 1447    Lab Status: Final result Specimen: Blood from Arm, Left Updated: 07/02/24 1518     Lipase 28 u/L     CBC and differential [[id number]]  (Abnormal) Collected: 07/02/24 1447    Lab Status: Final result Specimen: Blood from Arm, Left Updated: 07/02/24 1509     WBC 10.55 Thousand/uL      RBC 5.36 Million/uL      Hemoglobin 14.4 g/dL      Hematocrit 45.2 %      MCV 84 fL      MCH 26.9 pg      MCHC 31.9 g/dL      RDW 15.5 %      MPV 8.5 fL      Platelets 326 Thousands/uL      nRBC 0 /100 WBCs      Segmented % 70 %      Immature Grans % 1 %      Lymphocytes % 17 %      Monocytes % 10 %      Eosinophils Relative 2 %      Basophils Relative 0 %      Absolute Neutrophils 7.41 Thousands/µL      Absolute Immature Grans 0.08 Thousand/uL      Absolute Lymphocytes 1.82 Thousands/µL      Absolute Monocytes 1.01 Thousand/µL      Eosinophils Absolute 0.21 Thousand/µL      Basophils Absolute 0.02 Thousands/µL                    CT abdomen pelvis with contrast   Final Result by Sebastián Patel MD (07/02 1645)      Findings compatible with diarrhea. No evidence of colitis or diverticulitis         Workstation performed: [id number]                    Procedures  ECG 12 Lead Documentation Only    Date/Time: 7/2/2024 2:38 PM    Performed by: Edda Christian DO  Authorized by: Edda Christian DO    Indications / Diagnosis:  Tachy  Patient location:  ED  Rate:     ECG rate:  104    ECG rate assessment: tachycardic    Rhythm:     Rhythm: sinus tachycardia    Ectopy:     Ectopy: none    QRS:     QRS axis:  Left    QRS intervals:  Normal  ST segments:     ST segments:  Normal  T waves:     T waves: normal    Other findings:     Other findings: poor R wave progression             ED Course  ED Course as of 07/02/24 1807   Tue Jul 02, 2024   1417 Recent admission for diverticulitis- finished abx last Wednesday, had diarrhea  - prompted the last visited - diagnosed with diverticultis. Improved but then returned 2 days ago. 10+ times a day.    1730 Blood Pressure(!): 213/90  Daughter states that she did not take her blood pressure medications today.  Offered to to give her her home medications before discharge but she states that she will take these medications at home.                               SBIRT 20yo+      Flowsheet Row Most Recent Value   Initial Alcohol Screen: US AUDIT-C     1. How often do you have a drink containing alcohol? 0 Filed at: 07/02/2024 1412   2. How many drinks containing alcohol do you have on a typical day you are drinking?  0 Filed at: 07/02/2024 1412   3b. FEMALE Any Age, or MALE 65+: How often do you have 4 or more drinks on one occassion? 0 Filed at: 07/02/2024 1412   Audit-C Score 0 Filed at: 07/02/2024 1412   DIVYA: How many times in the past year have you...    Used an illegal drug or used a prescription medication for non-medical reasons? Never Filed at: 07/02/2024 1412                      Medical Decision Making  85 y/o female with diarrhea- will get labs, lactic, stool cultures, and ct abd/pel. Will give fluids and reassess.     Amount and/or Complexity of Data Reviewed  Labs: ordered.  Radiology: ordered.    Risk  Prescription drug management.             Disposition  Final diagnoses:   Diarrhea     Time reflects when diagnosis was documented in both MDM as applicable and the Disposition within this note       Time User Action Codes Description Comment    7/2/2024  5:30 PM Edda Christian Add [R19.7] Diarrhea           ED Disposition       ED Disposition   Discharge    Condition   Stable    Date/Time   Tue Jul 2, 2024 1730    Comment   Violet Dick discharge to home/self care.                   Follow-up Information       Follow up With Specialties Details Why Contact Info Additional Information    Your GI doctor  Call in 1 day For follow-up within 1 week      [facility]  Emergency Department Emergency Medicine Go to  Immediately for any new or worsening symptoms [address] [facility] [id number] [phone] [facility] Emergency Department, [address] [place], [place], [address]            Patient's Medications   Discharge Prescriptions    DICYCLOMINE (BENTYL) 20 MG TABLET    Take 1 tablet (20 mg total) by mouth 2 (two) times a day       Start Date: 7/2/2024  End Date: --       Order Dose: 20 mg       Quantity: 20 tablet    Refills: 0       No discharge procedures on file.    PDMP Review       None            ED Provider  Electronically Signed by             Edda Christian DO  07/02/24 2822

## 2024-07-02 NOTE — TELEPHONE ENCOUNTER
Pts daughter called back to say pt has been taking otc probiotic without relief. Pts daughter requests questran is sent in. Pharmacy on file confirmed.

## 2024-07-03 LAB
ATRIAL RATE: 104 BPM
C COLI+JEJUNI TUF STL QL NAA+PROBE: NEGATIVE
C DIFF TOX GENS STL QL NAA+PROBE: NEGATIVE
EC STX1+STX2 GENES STL QL NAA+PROBE: NEGATIVE
P AXIS: 43 DEGREES
PR INTERVAL: 166 MS
QRS AXIS: -18 DEGREES
QRSD INTERVAL: 78 MS
QT INTERVAL: 348 MS
QTC INTERVAL: 457 MS
SALMONELLA SP SPAO STL QL NAA+PROBE: NEGATIVE
SHIGELLA SP+EIEC IPAH STL QL NAA+PROBE: NEGATIVE
T WAVE AXIS: 44 DEGREES
VENTRICULAR RATE: 104 BPM

## 2024-07-03 PROCEDURE — 93010 ELECTROCARDIOGRAM REPORT: CPT | Performed by: INTERNAL MEDICINE

## 2024-07-06 ENCOUNTER — APPOINTMENT (EMERGENCY)
Dept: CT IMAGING | Facility: HOSPITAL | Age: 84
End: 2024-07-06
Payer: MEDICARE

## 2024-07-06 ENCOUNTER — APPOINTMENT (EMERGENCY)
Dept: RADIOLOGY | Facility: HOSPITAL | Age: 84
End: 2024-07-06
Payer: MEDICARE

## 2024-07-06 ENCOUNTER — HOSPITAL ENCOUNTER (EMERGENCY)
Facility: HOSPITAL | Age: 84
Discharge: HOME | End: 2024-07-06
Attending: EMERGENCY MEDICINE
Payer: MEDICARE

## 2024-07-06 VITALS
TEMPERATURE: 97.6 F | OXYGEN SATURATION: 98 % | SYSTOLIC BLOOD PRESSURE: 149 MMHG | DIASTOLIC BLOOD PRESSURE: 73 MMHG | RESPIRATION RATE: 17 BRPM | HEART RATE: 90 BPM

## 2024-07-06 DIAGNOSIS — M79.641 RIGHT HAND PAIN: ICD-10-CM
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
## 2024-07-06 LAB
ALBUMIN SERPL BCG-MCNC: 3.8 G/DL (ref 3.5–5)
ALP SERPL-CCNC: 61 U/L (ref 34–104)
ALT SERPL W P-5'-P-CCNC: 7 U/L (ref 7–52)
ANION GAP SERPL CALCULATED.3IONS-SCNC: 10 MMOL/L (ref 4–13)
AST SERPL W P-5'-P-CCNC: 9 U/L (ref 13–39)
BASOPHILS # BLD AUTO: 0.03 THOUSANDS/ÂΜL (ref 0–0.1)
BASOPHILS NFR BLD AUTO: 0 % (ref 0–1)
BILIRUB SERPL-MCNC: 0.75 MG/DL (ref 0.2–1)
BUN SERPL-MCNC: 22 MG/DL (ref 5–25)
CALCIUM SERPL-MCNC: 8.7 MG/DL (ref 8.4–10.2)
CHLORIDE SERPL-SCNC: 100 MMOL/L (ref 96–108)
CO2 SERPL-SCNC: 26 MMOL/L (ref 21–32)
CREAT SERPL-MCNC: 0.92 MG/DL (ref 0.6–1.3)
EOSINOPHIL # BLD AUTO: 0.07 THOUSAND/ÂΜL (ref 0–0.61)
EOSINOPHIL NFR BLD AUTO: 1 % (ref 0–6)
ERYTHROCYTE [DISTWIDTH] IN BLOOD BY AUTOMATED COUNT: 15.6 % (ref 11.6–15.1)
GFR SERPL CREATININE-BSD FRML MDRD: 57 ML/MIN/1.73SQ M
GLUCOSE SERPL-MCNC: 196 MG/DL (ref 65–140)
HCT VFR BLD AUTO: 42.1 % (ref 34.8–46.1)
HGB BLD-MCNC: 14.1 G/DL (ref 11.5–15.4)
IMM GRANULOCYTES # BLD AUTO: 0.07 THOUSAND/UL (ref 0–0.2)
IMM GRANULOCYTES NFR BLD AUTO: 1 % (ref 0–2)
LYMPHOCYTES # BLD AUTO: 1.59 THOUSANDS/ÂΜL (ref 0.6–4.47)
LYMPHOCYTES NFR BLD AUTO: 13 % (ref 14–44)
MCH RBC QN AUTO: 28 PG (ref 26.8–34.3)
MCHC RBC AUTO-ENTMCNC: 33.5 G/DL (ref 31.4–37.4)
MCV RBC AUTO: 84 FL (ref 82–98)
MONOCYTES # BLD AUTO: 0.89 THOUSAND/ÂΜL (ref 0.17–1.22)
MONOCYTES NFR BLD AUTO: 7 % (ref 4–12)
NEUTROPHILS # BLD AUTO: 9.41 THOUSANDS/ÂΜL (ref 1.85–7.62)
NEUTS SEG NFR BLD AUTO: 78 % (ref 43–75)
NRBC BLD AUTO-RTO: 0 /100 WBCS
PLATELET # BLD AUTO: 279 THOUSANDS/UL (ref 149–390)
PMV BLD AUTO: 8.1 FL (ref 8.9–12.7)
POTASSIUM SERPL-SCNC: 3.6 MMOL/L (ref 3.5–5.3)
PROT SERPL-MCNC: 7.1 G/DL (ref 6.4–8.4)
RBC # BLD AUTO: 5.03 MILLION/UL (ref 3.81–5.12)
SODIUM SERPL-SCNC: 136 MMOL/L (ref 135–147)
WBC # BLD AUTO: 12.06 THOUSAND/UL (ref 4.31–10.16)

## 2024-07-06 PROCEDURE — 85025 COMPLETE CBC W/AUTO DIFF WBC: CPT

## 2024-07-06 PROCEDURE — 72125 CT NECK SPINE W/O DYE: CPT

## 2024-07-06 PROCEDURE — 99285 EMERGENCY DEPT VISIT HI MDM: CPT

## 2024-07-06 PROCEDURE — 74177 CT ABD & PELVIS W/CONTRAST: CPT

## 2024-07-06 PROCEDURE — 70450 CT HEAD/BRAIN W/O DYE: CPT

## 2024-07-06 PROCEDURE — 71260 CT THORAX DX C+: CPT

## 2024-07-06 PROCEDURE — 80053 COMPREHEN METABOLIC PANEL: CPT

## 2024-07-06 PROCEDURE — 93005 ELECTROCARDIOGRAM TRACING: CPT

## 2024-07-06 PROCEDURE — 73130 X-RAY EXAM OF HAND: CPT

## 2024-07-06 PROCEDURE — 36415 COLL VENOUS BLD VENIPUNCTURE: CPT

## 2024-07-06 RX ADMIN — IOHEXOL 100 ML: 350 INJECTION, SOLUTION INTRAVENOUS at 13:37

## 2024-07-06 NOTE — ED PROVIDER NOTES
History  Chief Complaint   Patient presents with    Fall     Pt presents to ER from home for reports of falling out of bed - unknown head injury. Reporting R hand 4th digit injury, R elbow abrasion and pain. Daughter states she has been having trouble walking since but pt denies pain in lower extremities or hips. Pt takes daily aspirin.      Violet is an 83 yo female presenting after a fall that occurred this morning around 5 AM. Her daughter reports she heard the fall and went into the room to see that Violet had fallen of her bed. She denies any loss of consciousness. Daughter reports that at baseline, Violet has poor memory. Violet is unable to remember the fall an denies any pain at time of evaluation. She denies any pain in her lower extremities, chest, abdomen, or head. She does note pain upon palpation of her right ring finger. She takes aspirin 81 mg daily. Daughter reports she has a walker at home that she is supposed to use but is noncompliant.       History provided by:  Patient   used: No    Fall  Mechanism of injury: fall    Injury location:  Finger  Finger injury location:  R ring finger  Incident location:  Home  Fall:     Fall occurred:  From a bed    Impact surface:  Carpet    Point of impact:  Unable to specify  Associated symptoms: no abdominal pain, no chest pain and no headaches        Prior to Admission Medications   Prescriptions Last Dose Informant Patient Reported? Taking?   Empagliflozin (Jardiance) 25 MG TABS  Self Yes No   Sig: Take 25 mg by mouth every evening   Empagliflozin 25 MG TABS  Self Yes No   Sig: Take 25 mg by mouth daily   Patient not taking: Reported on 6/16/2024   aspirin 81 mg chewable tablet  Self Yes No   Sig: Chew 81 mg daily   atorvastatin (LIPITOR) 20 mg tablet  Self Yes No   Sig: Take 20 mg by mouth daily   cholestyramine (QUESTRAN) 4 g packet   No No   Sig: Take 1 packet (4 g total) by mouth 2 (two) times a day with meals Take other medications at  least 2 hours before or 4 hours after the Questran.  Decrease dose if constipation occurs.   dicyclomine (BENTYL) 20 mg tablet   No No   Sig: Take 1 tablet (20 mg total) by mouth 2 (two) times a day   losartan (COZAAR) 25 mg tablet  Self Yes No   Sig: Take 25 mg by mouth daily   metoprolol succinate (TOPROL-XL) 25 mg 24 hr tablet  Self Yes No   Sig: Take 25 mg by mouth daily   metoprolol tartrate (LOPRESSOR) 25 mg tablet  Self Yes No   Sig: Take 25 mg by mouth daily   Patient not taking: Reported on 6/21/2024      Facility-Administered Medications: None       Past Medical History:   Diagnosis Date    Cancer (HCC)     cervical 10 years ago- chemo radiation    Diabetes mellitus (HCC)     Diverticulitis     [place] (hard of hearing)     Hypertension        Past Surgical History:   Procedure Laterality Date    HYSTERECTOMY      partial    JOINT REPLACEMENT      foot and elbow       History reviewed. No pertinent family history.  I have reviewed and agree with the history as documented.    E-Cigarette/Vaping    E-Cigarette Use Never User      E-Cigarette/Vaping Substances     Social History     Tobacco Use    Smoking status: Never    Smokeless tobacco: Never   Vaping Use    Vaping status: Never Used   Substance Use Topics    Alcohol use: Never    Drug use: Never       Review of Systems   Constitutional:  Negative for fever.   HENT:  Negative for congestion.    Respiratory:  Negative for shortness of breath.    Cardiovascular:  Negative for chest pain.   Gastrointestinal:  Negative for abdominal pain.   Musculoskeletal:  Positive for joint swelling (Right ring finger swelling).   Skin:  Negative for color change and pallor.   Neurological:  Negative for dizziness, weakness, light-headedness and headaches.   All other systems reviewed and are negative.  Primary Survey:   Airway:        Status: patent;        Pre-hospital Interventions: none        Hospital Interventions: none  Breathing:        Pre-hospital Interventions:  none       Effort: normal       Right breath sounds: normal       Left breath sounds: normal  Circulation:        Rhythm: regular       Rate: regular   Right Pulses Left Pulses    R radial: 2+    R pedal: 2+     L radial: 2+    L pedal: 2+       Disability:        GCS: Eye: 4; Verbal: 5 Motor: 6 Total: 15       Right Pupil: 3 mm;  round;  reactive         Left Pupil:  3 mm;  round;  reactive      R Motor Strength L Motor Strength    R : 5/5  R dorsiflex: 5/5  R plantarflex: 5/5 L : 5/5  L dorsiflex: 5/5  L plantarflex: 5/5        Sensory:  No sensory deficit  Exposure:       Completed: Yes        Physical Exam  Physical Exam  Vitals and nursing note reviewed.   Constitutional:       General: She is not in acute distress.     Appearance: Normal appearance. She is not ill-appearing.   HENT:      Head: Normocephalic and atraumatic.      Right Ear: External ear normal.      Left Ear: External ear normal.      Nose: Nose normal.      Mouth/Throat:      Mouth: Mucous membranes are moist.      Pharynx: Oropharynx is clear.   Eyes:      Extraocular Movements: Extraocular movements intact.      Conjunctiva/sclera: Conjunctivae normal.      Pupils: Pupils are equal, round, and reactive to light.   Cardiovascular:      Rate and Rhythm: Normal rate and regular rhythm.      Pulses: Normal pulses.      Heart sounds: No murmur heard.  Pulmonary:      Effort: Pulmonary effort is normal.      Breath sounds: Normal breath sounds. No wheezing, rhonchi or rales.   Abdominal:      General: Abdomen is flat.      Palpations: Abdomen is soft.      Tenderness: There is no abdominal tenderness. There is no guarding or rebound.   Musculoskeletal:         General: Normal range of motion.      Right hand: Tenderness (Tenderness and ecchymosis to right 4th digit) present.      Cervical back: Normal and normal range of motion. No tenderness.      Thoracic back: Normal. No tenderness.      Lumbar back: Normal. No tenderness.   Skin:      General: Skin is warm.      Capillary Refill: Capillary refill takes less than 2 seconds.   Neurological:      General: No focal deficit present.      Mental Status: She is alert and oriented to person, place, and time. Mental status is at baseline.   Psychiatric:         Mood and Affect: Mood normal.         Behavior: Behavior normal.         Vital Signs  ED Triage Vitals   Temperature Pulse Respirations Blood Pressure SpO2   07/06/24 1229 07/06/24 1229 07/06/24 1229 07/06/24 1229 07/06/24 1229   97.6 °F (36.4 °C) 105 18 144/66 96 %      Temp Source Heart Rate Source Patient Position - Orthostatic VS BP Location FiO2 (%)   07/06/24 1229 07/06/24 1229 07/06/24 1229 07/06/24 1229 --   Tympanic Monitor Sitting Left arm       Pain Score       07/06/24 1231       4           Vitals:    07/06/24 1245 07/06/24 1330 07/06/24 1430 07/06/24 1500   BP: 126/59 132/63 135/60 149/73   Pulse: 86 97 87 90   Patient Position - Orthostatic VS: Lying  Lying          Visual Acuity  Visual Acuity      Flowsheet Row Most Recent Value   L Pupil Size (mm) 3   R Pupil Size (mm) 3            ED Medications  Medications   iohexol (OMNIPAQUE) 350 MG/ML injection (MULTI-DOSE) 100 mL (100 mL Intravenous Given 7/6/24 1337)       Diagnostic Studies  Results Reviewed       Procedure Component Value Units Date/Time    Comprehensive metabolic panel [[id number]]  (Abnormal) Collected: 07/06/24 1324    Lab Status: Final result Specimen: Blood from Arm, Left Updated: 07/06/24 1352     Sodium 136 mmol/L      Potassium 3.6 mmol/L      Chloride 100 mmol/L      CO2 26 mmol/L      ANION GAP 10 mmol/L      BUN 22 mg/dL      Creatinine 0.92 mg/dL      Glucose 196 mg/dL      Calcium 8.7 mg/dL      AST 9 U/L      ALT 7 U/L      Alkaline Phosphatase 61 U/L      Total Protein 7.1 g/dL      Albumin 3.8 g/dL      Total Bilirubin 0.75 mg/dL      eGFR 57 ml/min/1.73sq m     Narrative:      National Kidney Disease Foundation guidelines for Chronic Kidney Disease (CKD):      Stage 1 with normal or high GFR (GFR > 90 mL/min/1.73 square meters)    Stage 2 Mild CKD (GFR = 60-89 mL/min/1.73 square meters)    Stage 3A Moderate CKD (GFR = 45-59 mL/min/1.73 square meters)    Stage 3B Moderate CKD (GFR = 30-44 mL/min/1.73 square meters)    Stage 4 Severe CKD (GFR = 15-29 mL/min/1.73 square meters)    Stage 5 End Stage CKD (GFR <15 mL/min/1.73 square meters)  Note: GFR calculation is accurate only with a steady state creatinine    CBC and differential [[id number]]  (Abnormal) Collected: 07/06/24 1324    Lab Status: Final result Specimen: Blood from Arm, Left Updated: 07/06/24 1330     WBC 12.06 Thousand/uL      RBC 5.03 Million/uL      Hemoglobin 14.1 g/dL      Hematocrit 42.1 %      MCV 84 fL      MCH 28.0 pg      MCHC 33.5 g/dL      RDW 15.6 %      MPV 8.1 fL      Platelets 279 Thousands/uL      nRBC 0 /100 WBCs      Segmented % 78 %      Immature Grans % 1 %      Lymphocytes % 13 %      Monocytes % 7 %      Eosinophils Relative 1 %      Basophils Relative 0 %      Absolute Neutrophils 9.41 Thousands/µL      Absolute Immature Grans 0.07 Thousand/uL      Absolute Lymphocytes 1.59 Thousands/µL      Absolute Monocytes 0.89 Thousand/µL      Eosinophils Absolute 0.07 Thousand/µL      Basophils Absolute 0.03 Thousands/µL                    CT head without contrast   Final Result by Bradley Landon Kocher, MD (07/06 1414)      No acute intracranial abnormality.                  Workstation performed: [id number]         CT spine cervical without contrast   Final Result by Bradley Landon Kocher, MD (07/06 1438)      No cervical spine fracture or traumatic malalignment.                  Workstation performed: [id number]         CT chest abdomen pelvis w contrast   Final Result by Rudy Carrasco MD (07/06 0352)   1. No acute traumatic findings.   2. Cholelithiasis.                     Workstation performed: [id number]         XR hand 3+ views RIGHT    (Results Pending)              Procedures  ECG 12 Lead  Documentation Only    Date/Time: 7/6/2024 1:01 PM    Performed by: Martha Baker PA-C  Authorized by: Martha Baker PA-C    Indications / Diagnosis:  Fall  ECG reviewed by me, the ED Provider: yes    Patient location:  ED  Previous ECG:     Previous ECG:  Compared to current    Similarity:  No change    Comparison to cardiac monitor: Yes    Rate:     ECG rate:  97    ECG rate assessment: normal    Rhythm:     Rhythm: sinus rhythm    Ectopy:     Ectopy: none    QRS:     QRS axis:  Left    QRS intervals:  Normal  Conduction:     Conduction: normal    ST segments:     ST segments:  Normal  T waves:     T waves: normal    Other findings:     Other findings: LVH             ED Course  ED Course as of 07/06/24 1656   Sat Jul 06, 2024   1354 Comprehensive metabolic panel(!)   1355 CBC and differential(!)   1418 CT head without contrast  IMPRESSION:  No acute intracranial abnormality.   1441 CT spine cervical without contrast  IMPRESSION:  No cervical spine fracture or traumatic malalignment.   1500 CT chest abdomen pelvis w contrast  IMPRESSION:  1. No acute traumatic findings.  2. Cholelithiasis.                               SBIRT 22yo+      Flowsheet Row Most Recent Value   Initial Alcohol Screen: US AUDIT-C     1. How often do you have a drink containing alcohol? 0 Filed at: 07/06/2024 1232   2. How many drinks containing alcohol do you have on a typical day you are drinking?  0 Filed at: 07/06/2024 1232   3b. FEMALE Any Age, or MALE 65+: How often do you have 4 or more drinks on one occassion? 0 Filed at: 07/06/2024 1232   Audit-C Score 0 Filed at: 07/06/2024 1232   DIVYA: How many times in the past year have you...    Used an illegal drug or used a prescription medication for non-medical reasons? Never Filed at: 07/06/2024 1232                      Medical Decision Making  83 yo male presenting after a fall this AM. No LOC, takes aspiring 81 mg daily. Unsure of head strike. Patient is poor historian with  baseline memory dysfunction. She is oriented x 3. On exam, she is well-appearing. Heart regular rate and rhythm. Lungs CTA. Abdomen soft, non-tender. Hips/pelvis without tenderness. Lower extremities with full range of motion, no signs of trauma. Upper extremities with full range of motion. Ecchymosis, edema, and tenderness to right hand 4th digit. No spinal tenderness.     Plan: Due to unwitnessed fall and patient being a poor historian, will scan head, neck, chest, abdomen, and pelvis for traumatic injury. Xray right hand. Patient without pain and does not want any medication.     Scans unremarkable. Patient ambulated easily through the ED with a walker. She has a walker at home that she can justina to ambulate. Instructed patient and daughter to follow-up with primary care provider. ED return precautions discussed. Patient is in agreement and understanding with this plan. All questions answered.     Amount and/or Complexity of Data Reviewed  Labs: ordered. Decision-making details documented in ED Course.  Radiology: ordered. Decision-making details documented in ED Course.    Risk  Prescription drug management.             Disposition  Final diagnoses:   Fall, initial encounter   Right hand pain     Time reflects when diagnosis was documented in both MDM as applicable and the Disposition within this note       Time User Action Codes Description Comment    7/6/2024  3:01 PM Martha Baker Add [W19.XXXA] Fall, initial encounter     7/6/2024  3:01 PM Martha Baker Add [M79.641] Right hand pain           ED Disposition       ED Disposition   Discharge    Condition   Stable    Date/Time   Sat Jul 6, 2024 1501    Comment   Violet Dick discharge to home/self care.                   Follow-up Information       Follow up With Specialties Details Why Contact Info Additional Information    [facility], Nurse Practitioner Schedule an appointment as soon as possible for a visit   Robert BEAL  [id number]  [facility] [address]  [phone]       [facility], [facility] Emergency Medicine  If symptoms worsen [address] [facility] [id number] [phone] [facility], [address] [place], [place], [address]            Discharge Medication List as of 7/6/2024  3:15 PM        CONTINUE these medications which have NOT CHANGED    Details   aspirin 81 mg chewable tablet Chew 81 mg daily, Historical Med      atorvastatin (LIPITOR) 20 mg tablet Take 20 mg by mouth daily, Historical Med      cholestyramine (QUESTRAN) 4 g packet Take 1 packet (4 g total) by mouth 2 (two) times a day with meals Take other medications at least 2 hours before or 4 hours after the Questran.  Decrease dose if constipation occurs., Starting Tue 7/2/2024, Normal      dicyclomine (BENTYL) 20 mg tablet Take 1 tablet (20 mg total) by mouth 2 (two) times a day, Starting Tue 7/2/2024, Normal      !! Empagliflozin (Jardiance) 25 MG TABS Take 25 mg by mouth every evening, Historical Med      !! Empagliflozin 25 MG TABS Take 25 mg by mouth daily, Historical Med      losartan (COZAAR) 25 mg tablet Take 25 mg by mouth daily, Historical Med      metoprolol succinate (TOPROL-XL) 25 mg 24 hr tablet Take 25 mg by mouth daily, Starting Thu 5/9/2024, Historical Med      metoprolol tartrate (LOPRESSOR) 25 mg tablet Take 25 mg by mouth daily, Historical Med       !! - Potential duplicate medications found. Please discuss with provider.          No discharge procedures on file.    PDMP Review       None            ED Provider  Electronically Signed by             Martha Baker PA-C  07/06/24 3455

## 2024-07-07 LAB
ATRIAL RATE: 97 BPM
P AXIS: 47 DEGREES
PR INTERVAL: 176 MS
QRS AXIS: -30 DEGREES
QRSD INTERVAL: 76 MS
QT INTERVAL: 364 MS
QTC INTERVAL: 462 MS
T WAVE AXIS: 32 DEGREES
VENTRICULAR RATE: 97 BPM

## 2024-07-07 PROCEDURE — 93010 ELECTROCARDIOGRAM REPORT: CPT | Performed by: INTERNAL MEDICINE

## 2024-07-12 NOTE — TELEPHONE ENCOUNTER
Bob Message Unread    Called pt's daughter, left voicemail just following up to make sure she picked up the pt's medication

## 2024-07-17 ENCOUNTER — HOSPITAL ENCOUNTER (EMERGENCY)
Facility: HOSPITAL | Age: 84
Discharge: HOME | End: 2024-07-18
Attending: EMERGENCY MEDICINE
Payer: MEDICARE

## 2024-07-17 DIAGNOSIS — N12 PYELONEPHRITIS: ICD-10-CM

## 2024-07-17 DIAGNOSIS — R10.9 FLANK PAIN: Primary | ICD-10-CM

## 2024-07-17 LAB
ALBUMIN SERPL BCG-MCNC: 3.8 G/DL (ref 3.5–5)
ALP SERPL-CCNC: 86 U/L (ref 34–104)
ALT SERPL W P-5'-P-CCNC: 5 U/L (ref 7–52)
ANION GAP SERPL CALCULATED.3IONS-SCNC: 12 MMOL/L (ref 4–13)
AST SERPL W P-5'-P-CCNC: 11 U/L (ref 13–39)
BASOPHILS # BLD AUTO: 0.03 THOUSANDS/ÂΜL (ref 0–0.1)
BASOPHILS NFR BLD AUTO: 0 % (ref 0–1)
BILIRUB SERPL-MCNC: 0.67 MG/DL (ref 0.2–1)
BUN SERPL-MCNC: 22 MG/DL (ref 5–25)
CALCIUM SERPL-MCNC: 9.1 MG/DL (ref 8.4–10.2)
CHLORIDE SERPL-SCNC: 104 MMOL/L (ref 96–108)
CO2 SERPL-SCNC: 22 MMOL/L (ref 21–32)
CREAT SERPL-MCNC: 0.99 MG/DL (ref 0.6–1.3)
EOSINOPHIL # BLD AUTO: 0.08 THOUSAND/ÂΜL (ref 0–0.61)
EOSINOPHIL NFR BLD AUTO: 1 % (ref 0–6)
ERYTHROCYTE [DISTWIDTH] IN BLOOD BY AUTOMATED COUNT: 15 % (ref 11.6–15.1)
FLUAV RNA RESP QL NAA+PROBE: NEGATIVE
FLUBV RNA RESP QL NAA+PROBE: NEGATIVE
GFR SERPL CREATININE-BSD FRML MDRD: 52 ML/MIN/1.73SQ M
GLUCOSE SERPL-MCNC: 161 MG/DL (ref 65–140)
HCT VFR BLD AUTO: 43.6 % (ref 34.8–46.1)
HGB BLD-MCNC: 14.2 G/DL (ref 11.5–15.4)
IMM GRANULOCYTES # BLD AUTO: 0.05 THOUSAND/UL (ref 0–0.2)
IMM GRANULOCYTES NFR BLD AUTO: 1 % (ref 0–2)
LIPASE SERPL-CCNC: 10 U/L (ref 11–82)
LYMPHOCYTES # BLD AUTO: 1.39 THOUSANDS/ÂΜL (ref 0.6–4.47)
LYMPHOCYTES NFR BLD AUTO: 14 % (ref 14–44)
MCH RBC QN AUTO: 27.6 PG (ref 26.8–34.3)
MCHC RBC AUTO-ENTMCNC: 32.6 G/DL (ref 31.4–37.4)
MCV RBC AUTO: 85 FL (ref 82–98)
MONOCYTES # BLD AUTO: 1.05 THOUSAND/ÂΜL (ref 0.17–1.22)
MONOCYTES NFR BLD AUTO: 11 % (ref 4–12)
NEUTROPHILS # BLD AUTO: 7.23 THOUSANDS/ÂΜL (ref 1.85–7.62)
NEUTS SEG NFR BLD AUTO: 73 % (ref 43–75)
NRBC BLD AUTO-RTO: 0 /100 WBCS
PLATELET # BLD AUTO: 302 THOUSANDS/UL (ref 149–390)
PMV BLD AUTO: 8.9 FL (ref 8.9–12.7)
POTASSIUM SERPL-SCNC: 3.4 MMOL/L (ref 3.5–5.3)
PROT SERPL-MCNC: 7.2 G/DL (ref 6.4–8.4)
RBC # BLD AUTO: 5.15 MILLION/UL (ref 3.81–5.12)
RSV RNA RESP QL NAA+PROBE: NEGATIVE
SARS-COV-2 RNA RESP QL NAA+PROBE: NEGATIVE
SODIUM SERPL-SCNC: 138 MMOL/L (ref 135–147)
WBC # BLD AUTO: 9.83 THOUSAND/UL (ref 4.31–10.16)

## 2024-07-17 PROCEDURE — 85025 COMPLETE CBC W/AUTO DIFF WBC: CPT

## 2024-07-17 PROCEDURE — 0241U HB NFCT DS VIR RESP RNA 4 TRGT: CPT

## 2024-07-17 PROCEDURE — 93005 ELECTROCARDIOGRAM TRACING: CPT

## 2024-07-17 PROCEDURE — 36415 COLL VENOUS BLD VENIPUNCTURE: CPT

## 2024-07-17 PROCEDURE — 80053 COMPREHEN METABOLIC PANEL: CPT

## 2024-07-17 PROCEDURE — 83690 ASSAY OF LIPASE: CPT

## 2024-07-17 PROCEDURE — 99284 EMERGENCY DEPT VISIT MOD MDM: CPT

## 2024-07-18 ENCOUNTER — APPOINTMENT (EMERGENCY)
Dept: CT IMAGING | Facility: HOSPITAL | Age: 84
End: 2024-07-18
Payer: MEDICARE

## 2024-07-18 VITALS
DIASTOLIC BLOOD PRESSURE: 63 MMHG | TEMPERATURE: 97.4 F | SYSTOLIC BLOOD PRESSURE: 134 MMHG | RESPIRATION RATE: 17 BRPM | OXYGEN SATURATION: 98 % | HEART RATE: 60 BPM

## 2024-07-18 LAB
ATRIAL RATE: 86 BPM
BACTERIA UR QL AUTO: ABNORMAL /HPF
BILIRUB UR QL STRIP: NEGATIVE
BUDDING YEAST: PRESENT
CLARITY UR: ABNORMAL
COLOR UR: YELLOW
GLUCOSE UR STRIP-MCNC: ABNORMAL MG/DL
HGB UR QL STRIP.AUTO: ABNORMAL
KETONES UR STRIP-MCNC: NEGATIVE MG/DL
LEUKOCYTE ESTERASE UR QL STRIP: ABNORMAL
NITRITE UR QL STRIP: NEGATIVE
NON-SQ EPI CELLS URNS QL MICRO: ABNORMAL /HPF
P AXIS: 57 DEGREES
PH UR STRIP.AUTO: 5.5 [PH]
PR INTERVAL: 186 MS
PROT UR STRIP-MCNC: ABNORMAL MG/DL
QRS AXIS: -10 DEGREES
QRSD INTERVAL: 92 MS
QT INTERVAL: 382 MS
QTC INTERVAL: 457 MS
RBC #/AREA URNS AUTO: ABNORMAL /HPF
SP GR UR STRIP.AUTO: 1.05 (ref 1–1.03)
T WAVE AXIS: 29 DEGREES
UROBILINOGEN UR STRIP-ACNC: <2 MG/DL
VENTRICULAR RATE: 86 BPM
WBC #/AREA URNS AUTO: ABNORMAL /HPF
WBC CLUMPS # UR AUTO: PRESENT /UL

## 2024-07-18 PROCEDURE — 93010 ELECTROCARDIOGRAM REPORT: CPT | Performed by: INTERNAL MEDICINE

## 2024-07-18 PROCEDURE — 74177 CT ABD & PELVIS W/CONTRAST: CPT

## 2024-07-18 PROCEDURE — 96365 THER/PROPH/DIAG IV INF INIT: CPT

## 2024-07-18 PROCEDURE — 96361 HYDRATE IV INFUSION ADD-ON: CPT

## 2024-07-18 PROCEDURE — 81001 URINALYSIS AUTO W/SCOPE: CPT

## 2024-07-18 PROCEDURE — 87086 URINE CULTURE/COLONY COUNT: CPT

## 2024-07-18 RX ORDER — CEFPODOXIME PROXETIL 200 MG/1
200 TABLET, FILM COATED ORAL 2 TIMES DAILY
Qty: 20 TABLET | Refills: 0 | Status: SHIPPED | OUTPATIENT
Start: 2024-07-18 | End: 2024-07-28

## 2024-07-18 RX ORDER — CEFPODOXIME PROXETIL 200 MG/1
200 TABLET, FILM COATED ORAL 2 TIMES DAILY
Qty: 20 TABLET | Refills: 0 | Status: SHIPPED | OUTPATIENT
Start: 2024-07-18 | End: 2024-07-18

## 2024-07-18 RX ADMIN — IOHEXOL 100 ML: 350 INJECTION, SOLUTION INTRAVENOUS at 00:43

## 2024-07-18 RX ADMIN — CEFTRIAXONE SODIUM 1000 MG: 10 INJECTION, POWDER, FOR SOLUTION INTRAVENOUS at 02:01

## 2024-07-18 RX ADMIN — SODIUM CHLORIDE 500 ML: 0.9 INJECTION, SOLUTION INTRAVENOUS at 02:01

## 2024-07-18 NOTE — DISCHARGE INSTRUCTIONS
Follow-up with PCP and vascular surgery.  Take medicine as directed. If any symptoms worsen or new symptoms appear return to the ER.

## 2024-07-18 NOTE — ED PROVIDER NOTES
History  Chief Complaint   Patient presents with    Back Pain     C/o left lower back pain for a few days as well as loss of appetite     The patient is a 84 y.o. female with a history of cancer, diabetes, reticulitis, hypertension who presents to [facility] with a chief complaint of left flank/back pain. Symptoms began a few days ago and have been constant since onset. Her pain is currently rated as a 5/10 in severity and described as sharp with movement. Associated symptoms include loss of appetite. Symptoms are aggravated with movement and palpation and alleviating factors include none noted. The patient denies fever, chills, night sweats, chest pain, shortness of breath, urinary symptoms, new falls or trauma, numbness, tingling, erythema, lower extremity edema, . No other reported symptoms at this time.  Patient denies allergies to anything          History provided by:  Patient   used: No    Back Pain  Associated symptoms: no abdominal pain, no chest pain, no dysuria and no fever        Prior to Admission Medications   Prescriptions Last Dose Informant Patient Reported? Taking?   Empagliflozin (Jardiance) 25 MG TABS  Self Yes No   Sig: Take 25 mg by mouth every evening   Empagliflozin 25 MG TABS  Self Yes No   Sig: Take 25 mg by mouth daily   Patient not taking: Reported on 6/16/2024   aspirin 81 mg chewable tablet  Self Yes No   Sig: Chew 81 mg daily   atorvastatin (LIPITOR) 20 mg tablet  Self Yes No   Sig: Take 20 mg by mouth daily   cholestyramine (QUESTRAN) 4 g packet   No No   Sig: Take 1 packet (4 g total) by mouth 2 (two) times a day with meals Take other medications at least 2 hours before or 4 hours after the Questran.  Decrease dose if constipation occurs.   dicyclomine (BENTYL) 20 mg tablet   No No   Sig: Take 1 tablet (20 mg total) by mouth 2 (two) times a day   losartan (COZAAR) 25 mg tablet  Self Yes No   Sig: Take 25 mg by mouth daily   metoprolol succinate  (TOPROL-XL) 25 mg 24 hr tablet  Self Yes No   Sig: Take 25 mg by mouth daily   metoprolol tartrate (LOPRESSOR) 25 mg tablet  Self Yes No   Sig: Take 25 mg by mouth daily   Patient not taking: Reported on 6/21/2024      Facility-Administered Medications: None       Past Medical History:   Diagnosis Date    Cancer (HCC)     cervical 10 years ago- chemo radiation    Diabetes mellitus (HCC)     Diverticulitis     [place] (hard of hearing)     Hypertension        Past Surgical History:   Procedure Laterality Date    HYSTERECTOMY      partial    JOINT REPLACEMENT      foot and elbow       History reviewed. No pertinent family history.  I have reviewed and agree with the history as documented.    E-Cigarette/Vaping    E-Cigarette Use Never User      E-Cigarette/Vaping Substances     Social History     Tobacco Use    Smoking status: Never    Smokeless tobacco: Never   Vaping Use    Vaping status: Never Used   Substance Use Topics    Alcohol use: Never    Drug use: Never       Review of Systems   Constitutional:  Negative for chills and fever.   HENT:  Negative for ear pain and sore throat.    Eyes:  Negative for pain and visual disturbance.   Respiratory:  Negative for cough and shortness of breath.    Cardiovascular:  Negative for chest pain and palpitations.   Gastrointestinal:  Negative for abdominal pain and vomiting.   Genitourinary:  Positive for flank pain. Negative for dysuria and hematuria.   Musculoskeletal:  Positive for back pain. Negative for arthralgias.   Skin:  Negative for color change and rash.   Neurological:  Negative for seizures and syncope.   All other systems reviewed and are negative.      Physical Exam  Physical Exam  Vitals reviewed.   Constitutional:       General: She is not in acute distress.     Appearance: She is not ill-appearing.   HENT:      Mouth/Throat:      Mouth: Mucous membranes are moist.   Eyes:      Conjunctiva/sclera: Conjunctivae normal.      Pupils: Pupils are equal, round, and  reactive to light.   Cardiovascular:      Rate and Rhythm: Normal rate.      Pulses: Normal pulses.   Pulmonary:      Effort: Pulmonary effort is normal. No respiratory distress.      Breath sounds: Normal breath sounds. No stridor. No wheezing, rhonchi or rales.   Abdominal:      General: Abdomen is flat.      Tenderness: There is no abdominal tenderness. There is left CVA tenderness.   Musculoskeletal:         General: Tenderness present. No swelling, deformity or signs of injury. Normal range of motion.      Cervical back: Normal range of motion.      Right lower leg: No edema.      Left lower leg: No edema.   Skin:     General: Skin is warm and dry.      Capillary Refill: Capillary refill takes less than 2 seconds.      Coloration: Skin is not jaundiced.      Findings: No bruising.   Neurological:      Mental Status: She is alert and oriented to person, place, and time. Mental status is at baseline.         Vital Signs  ED Triage Vitals [07/17/24 2100]   Temperature Pulse Respirations Blood Pressure SpO2   (!) 97.4 °F (36.3 °C) (!) 107 18 119/61 98 %      Temp Source Heart Rate Source Patient Position - Orthostatic VS BP Location FiO2 (%)   Oral Monitor Sitting Left arm --      Pain Score       --           Vitals:    07/17/24 2100 07/18/24 0104   BP: 119/61 134/63   Pulse: (!) 107 60   Patient Position - Orthostatic VS: Sitting Lying         Visual Acuity      ED Medications  Medications   iohexol (OMNIPAQUE) 350 MG/ML injection (MULTI-DOSE) 100 mL (100 mL Intravenous Given 7/18/24 0043)   sodium chloride 0.9 % bolus 500 mL (0 mL Intravenous Stopped 7/18/24 0308)   ceftriaxone (ROCEPHIN) 1 g/50 mL in dextrose IVPB (0 mg Intravenous Stopped 7/18/24 0225)       Diagnostic Studies  Results Reviewed       Procedure Component Value Units Date/Time    Urine Microscopic [[id number]]  (Abnormal) Collected: 07/18/24 0103    Lab Status: Final result Specimen: Urine, Clean Catch Updated: 07/18/24 0130     RBC, UA  Innumerable /hpf      WBC, UA Innumerable /hpf      Epithelial Cells None Seen /hpf      Bacteria, UA None Seen /hpf      WBC Clumps Present     Budding Yeast Present    Urine culture [[id number]] Collected: 07/18/24 0103    Lab Status: In process Specimen: Urine, Clean Catch Updated: 07/18/24 0130    UA w Reflex to Microscopic w Reflex to Culture [[id number]]  (Abnormal) Collected: 07/18/24 0103    Lab Status: Final result Specimen: Urine, Clean Catch Updated: 07/18/24 0128     Color, UA Yellow     Clarity, UA Extra Turbid     Specific Gravity, UA 1.050     pH, UA 5.5     Leukocytes, UA Large     Nitrite, UA Negative     Protein, UA 30 (1+) mg/dl      Glucose, UA >=1000 (1%) mg/dl      Ketones, UA Negative mg/dl      Urobilinogen, UA <2.0 mg/dl      Bilirubin, UA Negative     Occult Blood, UA Small    FLU/RSV/COVID - if FLU/RSV clinically relevant [[id number]]  (Normal) Collected: 07/17/24 2249    Lab Status: Final result Specimen: Nares from Nose Updated: 07/17/24 2336     SARS-CoV-2 Negative     INFLUENZA A PCR Negative     INFLUENZA B PCR Negative     RSV PCR Negative    Narrative:      FOR PEDIATRIC PATIENTS - copy/paste COVID Guidelines URL to browser: [email or web address]    SARS-CoV-2 assay is a Nucleic Acid Amplification assay intended for the  qualitative detection of nucleic acid from SARS-CoV-2 in nasopharyngeal  swabs. Results are for the presumptive identification of SARS-CoV-2 RNA.    Positive results are indicative of infection with SARS-CoV-2, the virus  causing COVID-19, but do not rule out bacterial infection or co-infection  with other viruses. Laboratories within the [place] and its  territories are required to report all positive results to the appropriate  public health authorities. Negative results do not preclude SARS-CoV-2  infection and should not be used as the sole basis for treatment or other  patient management decisions.  Negative results must be combined with  clinical observations, patient history, and epidemiological information.  This test has not been FDA cleared or approved.    This test has been authorized by FDA under an Emergency Use Authorization  (EUA). This test is only authorized for the duration of time the  declaration that circumstances exist justifying the authorization of the  emergency use of an in vitro diagnostic tests for detection of SARS-CoV-2  virus and/or diagnosis of COVID-19 infection under section 564(b)(1) of  the Act, 21 U.S.C. 360bbb-3(b)(1), unless the authorization is terminated  or revoked sooner. The test has been validated but independent review by FDA  and CLIA is pending.    Test performed using [employer] GeneXpert: This RT-PCR assay targets N2,  a region unique to SARS-CoV-2. A conserved region in the E-gene was chosen  for pan-Sarbecovirus detection which includes SARS-CoV-2.    According to CMS-2020-01-R, this platform meets the definition of high-throughput technology.    Comprehensive metabolic panel [[id number]]  (Abnormal) Collected: 07/17/24 2249    Lab Status: Final result Specimen: Blood from Arm, Right Updated: 07/17/24 2316     Sodium 138 mmol/L      Potassium 3.4 mmol/L      Chloride 104 mmol/L      CO2 22 mmol/L      ANION GAP 12 mmol/L      BUN 22 mg/dL      Creatinine 0.99 mg/dL      Glucose 161 mg/dL      Calcium 9.1 mg/dL      AST 11 U/L      ALT 5 U/L      Alkaline Phosphatase 86 U/L      Total Protein 7.2 g/dL      Albumin 3.8 g/dL      Total Bilirubin 0.67 mg/dL      eGFR 52 ml/min/1.73sq m     Narrative:      National Kidney Disease Foundation guidelines for Chronic Kidney Disease (CKD):     Stage 1 with normal or high GFR (GFR > 90 mL/min/1.73 square meters)    Stage 2 Mild CKD (GFR = 60-89 mL/min/1.73 square meters)    Stage 3A Moderate CKD (GFR = 45-59 mL/min/1.73 square meters)    Stage 3B Moderate CKD (GFR = 30-44 mL/min/1.73 square meters)    Stage 4 Severe CKD (GFR =  15-29 mL/min/1.73 square meters)    Stage 5 End Stage CKD (GFR <15 mL/min/1.73 square meters)  Note: GFR calculation is accurate only with a steady state creatinine    Lipase [[id number]]  (Abnormal) Collected: 07/17/24 2249    Lab Status: Final result Specimen: Blood from Arm, Right Updated: 07/17/24 2316     Lipase 10 u/L     CBC and differential [[id number]]  (Abnormal) Collected: 07/17/24 2249    Lab Status: Final result Specimen: Blood from Arm, Right Updated: 07/17/24 2301     WBC 9.83 Thousand/uL      RBC 5.15 Million/uL      Hemoglobin 14.2 g/dL      Hematocrit 43.6 %      MCV 85 fL      MCH 27.6 pg      MCHC 32.6 g/dL      RDW 15.0 %      MPV 8.9 fL      Platelets 302 Thousands/uL      nRBC 0 /100 WBCs      Segmented % 73 %      Immature Grans % 1 %      Lymphocytes % 14 %      Monocytes % 11 %      Eosinophils Relative 1 %      Basophils Relative 0 %      Absolute Neutrophils 7.23 Thousands/µL      Absolute Immature Grans 0.05 Thousand/uL      Absolute Lymphocytes 1.39 Thousands/µL      Absolute Monocytes 1.05 Thousand/µL      Eosinophils Absolute 0.08 Thousand/µL      Basophils Absolute 0.03 Thousands/µL                    CT abdomen pelvis with contrast   Final Result by Willie Duenas MD (07/18 0116)      No acute findings in the abdomen or pelvis.      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 Correlate for right lower extremity claudication symptoms         Workstation performed: [id number]                    Procedures  Procedures         ED Course  ED Course as of 07/18/24 0652   Thu Jul 18, 2024   0127 CT abdomen pelvis with contrast  No acute findings in the abdomen or pelvis.     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 Correlate for right lower extremity claudication symptoms                                   SBIRT 20yo+      Flowsheet Row Most Recent Value   Initial Alcohol  Screen: US AUDIT-C     1. How often do you have a drink containing alcohol? 0 Filed at: 07/17/2024 2102   2. How many drinks containing alcohol do you have on a typical day you are drinking?  0 Filed at: 07/17/2024 2102   3b. FEMALE Any Age, or MALE 65+: How often do you have 4 or more drinks on one occassion? 0 Filed at: 07/17/2024 2102   Audit-C Score 0 Filed at: 07/17/2024 2102   DIVYA: How many times in the past year have you...    Used an illegal drug or used a prescription medication for non-medical reasons? Never Filed at: 07/17/2024 2102                      Medical Decision Making  This patient presents with back pain most consistent with flank or muscle strain. Differential diagnoses includes lumbago versus musculoskeletal spasm / strain versus sciatica. Less likely sciatica as straight leg raise test was negative. No back pain red flags on history or physical. Presentation not consistent with malignancy (lack of history of malignancy, lack of B symptoms), fracture (no trauma, no bony tenderness to palpation), cauda equina (no bowel or urinary incontinence/retention, no saddle anesthesia, no distal weakness), AAA, viscus perforation, osteomyelitis or epidural abscess (no IVDU, vertebral tenderness), renal colic, pyelonephritis (afebrile, no CVAT, no urinary symptoms). CT abdomen and pelvis showed No acute findings in the abdomen or pelvis.  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 Correlate for right lower extremity claudication symptoms  Patient is afebrile and well-appearing. Discussed case with vacular surgery who looked at imaging and occlusion is chronic. They recommended statin and aspirin which she takes. Shared decision making was had with the patient and daughter, ultimately they would like to go home and trial oral abx. 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 Prior to discharge,  discharge instructions were discussed with patient at bedside. Patient was provided both verbal and written instructions. Patient is understanding of the discharge instructions and is agreeable to plan of care. Return precautions were discussed with patient bedside, patient verbalized understanding of signs and symptoms that would necessitate return to the ED. All questions were answered. Patient was comfortable with the plan of care and discharged to home.       Problems Addressed:  Flank pain: acute illness or injury  Pyelonephritis: acute illness or injury    Amount and/or Complexity of Data Reviewed  Labs: ordered.  Radiology: ordered. Decision-making details documented in ED Course.    Risk  Prescription drug management.                 Disposition  Final diagnoses:   Flank pain   Pyelonephritis     Time reflects when diagnosis was documented in both MDM as applicable and the Disposition within this note       Time User Action Codes Description Comment    7/18/2024  2:48 AM Benoit Hooker [R10.9] Flank pain     7/18/2024  2:48 AM Benoit Hooker [N12] Pyelonephritis           ED Disposition       ED Disposition   Discharge    Condition   Stable    Date/Time   Thu Jul 18, 2024 0249    Comment   Violet Dick discharge to home/self care.                   Follow-up Information       Follow up With Specialties Details Why Contact Info Additional Information    [facility], Nurse Practitioner Schedule an appointment as soon as possible for a visit   [address]  [facility] [address]  [facility] [address]  [phone]       [facility] Emergency Department Emergency Medicine  If symptoms worsen [address] [facility] [address]  [phone] [facility] Emergency Department, [address] [place], [place], [address]            Discharge Medication List as of 7/18/2024  2:49 AM        START taking these medications    Details    cefpodoxime (VANTIN) 200 mg tablet Take 1 tablet (200 mg total) by mouth 2 (two) times a day for 10 days, Starting u 7/18/2024, Until Sun 7/28/2024, Normal           CONTINUE these medications which have NOT CHANGED    Details   aspirin 81 mg chewable tablet Chew 81 mg daily, Historical Med      atorvastatin (LIPITOR) 20 mg tablet Take 20 mg by mouth daily, Historical Med      cholestyramine (QUESTRAN) 4 g packet Take 1 packet (4 g total) by mouth 2 (two) times a day with meals Take other medications at least 2 hours before or 4 hours after the Questran.  Decrease dose if constipation occurs., Starting Tue 7/2/2024, Normal      dicyclomine (BENTYL) 20 mg tablet Take 1 tablet (20 mg total) by mouth 2 (two) times a day, Starting Tue 7/2/2024, Normal      !! Empagliflozin (Jardiance) 25 MG TABS Take 25 mg by mouth every evening, Historical Med      !! Empagliflozin 25 MG TABS Take 25 mg by mouth daily, Historical Med      losartan (COZAAR) 25 mg tablet Take 25 mg by mouth daily, Historical Med      metoprolol succinate (TOPROL-XL) 25 mg 24 hr tablet Take 25 mg by mouth daily, Starting Thu 5/9/2024, Historical Med      metoprolol tartrate (LOPRESSOR) 25 mg tablet Take 25 mg by mouth daily, Historical Med       !! - Potential duplicate medications found. Please discuss with provider.          No discharge procedures on file.    PDMP Review       None            ED Provider  Electronically Signed by             Benoit Hooker PA-C  07/18/24 0655

## 2024-07-20 LAB
BACTERIA UR CULT: ABNORMAL
BACTERIA UR CULT: ABNORMAL

## 2024-07-24 DIAGNOSIS — R19.7 DIARRHEA, UNSPECIFIED TYPE: ICD-10-CM

## 2024-07-24 RX ORDER — CHOLESTYRAMINE 4 G/9G
POWDER, FOR SUSPENSION ORAL
Qty: 200 PACKET | Refills: 1 | Status: SHIPPED | OUTPATIENT
Start: 2024-07-24

## 2024-07-31 ENCOUNTER — PREP FOR PROCEDURE (OUTPATIENT)
Age: 84
End: 2024-07-31

## 2024-08-02 ENCOUNTER — TELEPHONE (OUTPATIENT)
Dept: GASTROENTEROLOGY | Facility: CLINIC | Age: 84
End: 2024-08-02

## 2024-08-05 ENCOUNTER — HOSPITAL ENCOUNTER (OUTPATIENT)
Dept: GASTROENTEROLOGY | Facility: HOSPITAL | Age: 84
Setting detail: OUTPATIENT SURGERY
Discharge: HOME | End: 2024-08-05
Payer: MEDICARE

## 2024-08-05 ENCOUNTER — ANESTHESIA (OUTPATIENT)
Dept: GASTROENTEROLOGY | Facility: HOSPITAL | Age: 84
End: 2024-08-05

## 2024-08-05 ENCOUNTER — ANESTHESIA EVENT (OUTPATIENT)
Dept: GASTROENTEROLOGY | Facility: HOSPITAL | Age: 84
End: 2024-08-05

## 2024-08-05 VITALS
WEIGHT: 136.69 LBS | BODY MASS INDEX: 27.56 KG/M2 | OXYGEN SATURATION: 99 % | RESPIRATION RATE: 18 BRPM | TEMPERATURE: 97.7 F | SYSTOLIC BLOOD PRESSURE: 116 MMHG | DIASTOLIC BLOOD PRESSURE: 56 MMHG | HEART RATE: 59 BPM | HEIGHT: 59 IN

## 2024-08-05 DIAGNOSIS — R19.7 DIARRHEA, UNSPECIFIED TYPE: ICD-10-CM

## 2024-08-05 DIAGNOSIS — K57.92 ACUTE DIVERTICULITIS: ICD-10-CM

## 2024-08-05 PROBLEM — Z90.710 HISTORY OF HYSTERECTOMY: Status: ACTIVE | Noted: 2024-08-05

## 2024-08-05 PROBLEM — H91.90 HARD OF HEARING: Status: ACTIVE | Noted: 2024-08-05

## 2024-08-05 LAB — GLUCOSE SERPL-MCNC: 138 MG/DL (ref 65–140)

## 2024-08-05 PROCEDURE — 82948 REAGENT STRIP/BLOOD GLUCOSE: CPT

## 2024-08-05 PROCEDURE — 88305 TISSUE EXAM BY PATHOLOGIST: CPT | Performed by: PATHOLOGY

## 2024-08-05 PROCEDURE — 45380 COLONOSCOPY AND BIOPSY: CPT | Performed by: INTERNAL MEDICINE

## 2024-08-05 RX ORDER — SODIUM CHLORIDE, SODIUM LACTATE, POTASSIUM CHLORIDE, CALCIUM CHLORIDE 600; 310; 30; 20 MG/100ML; MG/100ML; MG/100ML; MG/100ML
INJECTION, SOLUTION INTRAVENOUS CONTINUOUS PRN
Status: DISCONTINUED | OUTPATIENT
Start: 2024-08-05 | End: 2024-08-05

## 2024-08-05 RX ORDER — PROPOFOL 10 MG/ML
INJECTION, EMULSION INTRAVENOUS AS NEEDED
Status: DISCONTINUED | OUTPATIENT
Start: 2024-08-05 | End: 2024-08-05

## 2024-08-05 RX ADMIN — PROPOFOL 20 MG: 10 INJECTION, EMULSION INTRAVENOUS at 07:21

## 2024-08-05 RX ADMIN — SODIUM CHLORIDE, SODIUM LACTATE, POTASSIUM CHLORIDE, AND CALCIUM CHLORIDE: .6; .31; .03; .02 INJECTION, SOLUTION INTRAVENOUS at 07:15

## 2024-08-05 RX ADMIN — PROPOFOL 70 MG: 10 INJECTION, EMULSION INTRAVENOUS at 07:19

## 2024-08-05 RX ADMIN — PROPOFOL 20 MG: 10 INJECTION, EMULSION INTRAVENOUS at 07:25

## 2024-08-05 RX ADMIN — PROPOFOL 30 MG: 10 INJECTION, EMULSION INTRAVENOUS at 07:32

## 2024-08-05 RX ADMIN — PROPOFOL 40 MG: 10 INJECTION, EMULSION INTRAVENOUS at 07:29

## 2024-08-05 RX ADMIN — PROPOFOL 20 MG: 10 INJECTION, EMULSION INTRAVENOUS at 07:23

## 2024-08-05 NOTE — ANESTHESIA POSTPROCEDURE EVALUATION
Post-Op Assessment Note    CV Status:  Stable  Pain Score: 0    Pain management: adequate       Mental Status:  Sleepy   Hydration Status:  Euvolemic   PONV Controlled:  Controlled   Airway Patency:  Patent     Post Op Vitals Reviewed: Yes    No anethesia notable event occurred.    Staff: CRNA               /53 (08/05/24 0740)    Temp 97.7 °F (36.5 °C) (08/05/24 0740)    Pulse 68 (08/05/24 0740)   Resp 20 (08/05/24 0740)    SpO2 96 % (08/05/24 0740)

## 2024-08-05 NOTE — H&P
"History and Physical -  [facility]  Violet Dick 84 y.o. female MRN: [id number]                  HPI: Violet Dick is a 84 y.o. year old female who presents for colonoscopy for diarrhea, change in bowel habits, abnormal CT scan, diverticulitis.  Last colonoscopy 7 years ago      REVIEW OF SYSTEMS: Per the HPI, and otherwise unremarkable.    Historical Information   Past Medical History:   Diagnosis Date    Cancer (HCC)     cervical 10 years ago- chemo radiation    Diabetes mellitus (HCC)     Diverticulitis     [place] (hard of hearing)     Hypertension      Past Surgical History:   Procedure Laterality Date    CATARACT EXTRACTION      ELBOW SURGERY Right     HYSTERECTOMY      partial    TOE SURGERY      Daughter unsure side     Social History   Social History     Substance and Sexual Activity   Alcohol Use Never     Social History     Substance and Sexual Activity   Drug Use Never     Social History     Tobacco Use   Smoking Status Never   Smokeless Tobacco Never     History reviewed. No pertinent family history.    Meds/Allergies     Not in a hospital admission.    No Known Allergies    Objective     Blood pressure 132/62, pulse 85, temperature 97.7 °F (36.5 °C), temperature source Temporal, resp. rate 16, height 4' 11\" (1.499 m), weight 62 kg (136 lb 11 oz), SpO2 97%.      PHYSICAL EXAM    Gen: NAD  CV: RRR  CHEST: Clear  ABD: soft, NT/ND  EXT: no edema  Neuro: AAO      ASSESSMENT/PLAN:  This is a 84 y.o. year old female here for change in bowel habits, diarrhea, abnormal CT with diverticulitis    PLAN:   Procedure: Colonoscopy          "

## 2024-08-05 NOTE — ANESTHESIA PREPROCEDURE EVALUATION
Procedure:  COLONOSCOPY    Relevant Problems   ANESTHESIA (within normal limits)      CARDIO  Last dose of metoprolol yesterday   (+) HLD (hyperlipidemia)   (+) HTN (hypertension)      ENDO  Last dose of jardiance 5 days ago   (+) Diabetes mellitus, type 2 (HCC)      GI/HEPATIC  Confirmed NPO appropriate  Recent weight loss, episode of diverticulitis 6/2024 with ongoing diarrhea      /RENAL (within normal limits)      GYN   (+) History of hysterectomy      HEMATOLOGY (within normal limits)      NEURO/PSYCH (within normal limits)      PULMONARY (within normal limits)   (-) Smoking   (-) URI (upper respiratory infection)      Nervous and Auditory   (+) Hard of hearing        Physical Exam    Airway    Mallampati score: II  TM Distance: >3 FB  Neck ROM: limited     Dental   Comment: edentulous     Cardiovascular  Rhythm: regular, Rate: normal    Pulmonary   Breath sounds clear to auscultation    Other Findings  post-pubertal.      Anesthesia Plan  ASA Score- 3     Anesthesia Type- IV sedation with anesthesia with ASA Monitors.         Additional Monitors:     Airway Plan:     Comment: I discussed the risks and benefits of IV sedation anesthesia including the possibility of the need to convert to general anesthesia and the potential risk of awareness.  The patient was given the opportunity to ask questions, which were answered..       Plan Factors-    Chart reviewed.        Patient is not a current smoker.              Induction- intravenous.    Postoperative Plan-         Informed Consent- Anesthetic plan and risks discussed with patient and daughter.  I personally reviewed this patient with the CRNA. Discussed and agreed on the Anesthesia Plan with the CRNA..

## 2024-08-07 PROCEDURE — 88305 TISSUE EXAM BY PATHOLOGIST: CPT | Performed by: PATHOLOGY

## 2024-08-09 ENCOUNTER — APPOINTMENT (EMERGENCY)
Dept: CT IMAGING | Facility: HOSPITAL | Age: 84
End: 2024-08-09
Payer: MEDICARE

## 2024-08-09 ENCOUNTER — HOSPITAL ENCOUNTER (EMERGENCY)
Facility: HOSPITAL | Age: 84
Discharge: HOME | End: 2024-08-09
Attending: EMERGENCY MEDICINE
Payer: MEDICARE

## 2024-08-09 VITALS
DIASTOLIC BLOOD PRESSURE: 76 MMHG | TEMPERATURE: 97.7 F | RESPIRATION RATE: 20 BRPM | OXYGEN SATURATION: 98 % | SYSTOLIC BLOOD PRESSURE: 164 MMHG | HEART RATE: 62 BPM

## 2024-08-09 DIAGNOSIS — N39.0 UTI (URINARY TRACT INFECTION): Primary | ICD-10-CM

## 2024-08-09 LAB
2HR DELTA HS TROPONIN: 0 NG/L
ALBUMIN SERPL BCG-MCNC: 3.9 G/DL (ref 3.5–5)
ALP SERPL-CCNC: 91 U/L (ref 34–104)
ALT SERPL W P-5'-P-CCNC: 7 U/L (ref 7–52)
ANION GAP SERPL CALCULATED.3IONS-SCNC: 11 MMOL/L (ref 4–13)
APTT PPP: 29 SECONDS (ref 23–34)
AST SERPL W P-5'-P-CCNC: 10 U/L (ref 13–39)
BACTERIA UR QL AUTO: ABNORMAL /HPF
BASOPHILS # BLD AUTO: 0.03 THOUSANDS/ÂΜL (ref 0–0.1)
BASOPHILS NFR BLD AUTO: 0 % (ref 0–1)
BILIRUB SERPL-MCNC: 0.92 MG/DL (ref 0.2–1)
BILIRUB UR QL STRIP: NEGATIVE
BUDDING YEAST: PRESENT
BUN SERPL-MCNC: 15 MG/DL (ref 5–25)
CALCIUM SERPL-MCNC: 8.6 MG/DL (ref 8.4–10.2)
CARDIAC TROPONIN I PNL SERPL HS: 5 NG/L
CARDIAC TROPONIN I PNL SERPL HS: 5 NG/L
CHLORIDE SERPL-SCNC: 104 MMOL/L (ref 96–108)
CLARITY UR: ABNORMAL
CO2 SERPL-SCNC: 25 MMOL/L (ref 21–32)
COLOR UR: YELLOW
CREAT SERPL-MCNC: 0.83 MG/DL (ref 0.6–1.3)
EOSINOPHIL # BLD AUTO: 0.08 THOUSAND/ÂΜL (ref 0–0.61)
EOSINOPHIL NFR BLD AUTO: 1 % (ref 0–6)
ERYTHROCYTE [DISTWIDTH] IN BLOOD BY AUTOMATED COUNT: 15.2 % (ref 11.6–15.1)
GFR SERPL CREATININE-BSD FRML MDRD: 64 ML/MIN/1.73SQ M
GLUCOSE SERPL-MCNC: 136 MG/DL (ref 65–140)
GLUCOSE UR STRIP-MCNC: ABNORMAL MG/DL
HCT VFR BLD AUTO: 42.3 % (ref 34.8–46.1)
HGB BLD-MCNC: 14.1 G/DL (ref 11.5–15.4)
HGB UR QL STRIP.AUTO: ABNORMAL
IMM GRANULOCYTES # BLD AUTO: 0.07 THOUSAND/UL (ref 0–0.2)
IMM GRANULOCYTES NFR BLD AUTO: 1 % (ref 0–2)
INR PPP: 1.06 (ref 0.85–1.19)
KETONES UR STRIP-MCNC: NEGATIVE MG/DL
LACTATE SERPL-SCNC: 1.7 MMOL/L (ref 0.5–2)
LEUKOCYTE ESTERASE UR QL STRIP: ABNORMAL
LIPASE SERPL-CCNC: <6 U/L (ref 11–82)
LYMPHOCYTES # BLD AUTO: 1.55 THOUSANDS/ÂΜL (ref 0.6–4.47)
LYMPHOCYTES NFR BLD AUTO: 14 % (ref 14–44)
MCH RBC QN AUTO: 27.9 PG (ref 26.8–34.3)
MCHC RBC AUTO-ENTMCNC: 33.3 G/DL (ref 31.4–37.4)
MCV RBC AUTO: 84 FL (ref 82–98)
MONOCYTES # BLD AUTO: 0.8 THOUSAND/ÂΜL (ref 0.17–1.22)
MONOCYTES NFR BLD AUTO: 7 % (ref 4–12)
MUCOUS THREADS UR QL AUTO: ABNORMAL
NEUTROPHILS # BLD AUTO: 8.21 THOUSANDS/ÂΜL (ref 1.85–7.62)
NEUTS SEG NFR BLD AUTO: 77 % (ref 43–75)
NITRITE UR QL STRIP: NEGATIVE
NON-SQ EPI CELLS URNS QL MICRO: ABNORMAL /HPF
NRBC BLD AUTO-RTO: 0 /100 WBCS
PH UR STRIP.AUTO: 6 [PH]
PLATELET # BLD AUTO: 256 THOUSANDS/UL (ref 149–390)
PMV BLD AUTO: 8.6 FL (ref 8.9–12.7)
POTASSIUM SERPL-SCNC: 3 MMOL/L (ref 3.5–5.3)
PROCALCITONIN SERPL-MCNC: 0.06 NG/ML
PROT SERPL-MCNC: 7.1 G/DL (ref 6.4–8.4)
PROT UR STRIP-MCNC: ABNORMAL MG/DL
PROTHROMBIN TIME: 14.5 SECONDS (ref 12.3–15)
RBC # BLD AUTO: 5.06 MILLION/UL (ref 3.81–5.12)
RBC #/AREA URNS AUTO: ABNORMAL /HPF
SODIUM SERPL-SCNC: 140 MMOL/L (ref 135–147)
SP GR UR STRIP.AUTO: 1.05 (ref 1–1.03)
UROBILINOGEN UR STRIP-ACNC: <2 MG/DL
WBC # BLD AUTO: 10.74 THOUSAND/UL (ref 4.31–10.16)
WBC #/AREA URNS AUTO: ABNORMAL /HPF

## 2024-08-09 PROCEDURE — 36415 COLL VENOUS BLD VENIPUNCTURE: CPT | Performed by: EMERGENCY MEDICINE

## 2024-08-09 PROCEDURE — 99285 EMERGENCY DEPT VISIT HI MDM: CPT | Performed by: EMERGENCY MEDICINE

## 2024-08-09 PROCEDURE — 87086 URINE CULTURE/COLONY COUNT: CPT | Performed by: EMERGENCY MEDICINE

## 2024-08-09 PROCEDURE — 83690 ASSAY OF LIPASE: CPT | Performed by: EMERGENCY MEDICINE

## 2024-08-09 PROCEDURE — 96360 HYDRATION IV INFUSION INIT: CPT

## 2024-08-09 PROCEDURE — 81001 URINALYSIS AUTO W/SCOPE: CPT | Performed by: EMERGENCY MEDICINE

## 2024-08-09 PROCEDURE — 93005 ELECTROCARDIOGRAM TRACING: CPT

## 2024-08-09 PROCEDURE — 99284 EMERGENCY DEPT VISIT MOD MDM: CPT

## 2024-08-09 PROCEDURE — 84145 PROCALCITONIN (PCT): CPT | Performed by: EMERGENCY MEDICINE

## 2024-08-09 PROCEDURE — 80053 COMPREHEN METABOLIC PANEL: CPT | Performed by: EMERGENCY MEDICINE

## 2024-08-09 PROCEDURE — 85025 COMPLETE CBC W/AUTO DIFF WBC: CPT | Performed by: EMERGENCY MEDICINE

## 2024-08-09 PROCEDURE — 83605 ASSAY OF LACTIC ACID: CPT | Performed by: EMERGENCY MEDICINE

## 2024-08-09 PROCEDURE — 87040 BLOOD CULTURE FOR BACTERIA: CPT | Performed by: EMERGENCY MEDICINE

## 2024-08-09 PROCEDURE — 85610 PROTHROMBIN TIME: CPT | Performed by: EMERGENCY MEDICINE

## 2024-08-09 PROCEDURE — 96361 HYDRATE IV INFUSION ADD-ON: CPT

## 2024-08-09 PROCEDURE — 85730 THROMBOPLASTIN TIME PARTIAL: CPT | Performed by: EMERGENCY MEDICINE

## 2024-08-09 PROCEDURE — 84484 ASSAY OF TROPONIN QUANT: CPT | Performed by: EMERGENCY MEDICINE

## 2024-08-09 PROCEDURE — 74177 CT ABD & PELVIS W/CONTRAST: CPT

## 2024-08-09 RX ORDER — FLUCONAZOLE 100 MG/1
200 TABLET ORAL ONCE
Status: COMPLETED | OUTPATIENT
Start: 2024-08-09 | End: 2024-08-09

## 2024-08-09 RX ORDER — POTASSIUM CHLORIDE 20MEQ/15ML
40 LIQUID (ML) ORAL ONCE
Status: COMPLETED | OUTPATIENT
Start: 2024-08-09 | End: 2024-08-09

## 2024-08-09 RX ORDER — FLUCONAZOLE 200 MG/1
200 TABLET ORAL DAILY
Qty: 7 TABLET | Refills: 0 | Status: SHIPPED | OUTPATIENT
Start: 2024-08-09 | End: 2024-08-16

## 2024-08-09 RX ADMIN — IOHEXOL 100 ML: 350 INJECTION, SOLUTION INTRAVENOUS at 17:50

## 2024-08-09 RX ADMIN — SODIUM CHLORIDE 1000 ML: 0.9 INJECTION, SOLUTION INTRAVENOUS at 17:06

## 2024-08-09 RX ADMIN — POTASSIUM CHLORIDE 40 MEQ: 1.5 SOLUTION ORAL at 21:48

## 2024-08-09 RX ADMIN — FLUCONAZOLE 200 MG: 100 TABLET ORAL at 21:49

## 2024-08-09 NOTE — ED PROVIDER NOTES
History  Chief Complaint   Patient presents with    Flank Pain     Pt had a kidney infection 2 weeks ago and completed her abx. Pt woke up this morning complaining of flank pain and abd pain. Pt daughter states pt has lost 10 lbs in the last 2 weeks and is concerned the infection came back      85 y/o female presents to the ED for generalized weakness, fatigue, and R flank pain. She has been seen here multiple times for similar. Daughter states that she was seen here 2 weeks ago and diagnosed with urinary tract infection.  States that she was sent home on antibiotics.  She reports that she finished the course and was improved.  However, today she noticed that she was more generally weak and had a hard time getting off the toilet.  She states that she has been sleeping more today.  Denies any confusion or falls.  She states that she did complain of right flank pain but denied any urinary symptoms.  She also notes a 10 pound weight loss since her last visit 2 weeks ago.  She denies any chest pain, shortness of breath, fevers, headache, or diarrhea/constipation.  No other complaints.      History provided by:  Patient  Flank Pain  Pain location:  R flank  Pain quality comment:  Unable to specify  Pain radiates to:  Does not radiate  Pain severity:  Mild  Onset quality:  Sudden  Timing:  Constant  Chronicity:  New  Context: not previous surgeries and not sick contacts    Relieved by:  None tried  Worsened by:  Nothing  Ineffective treatments:  None tried  Associated symptoms: no chest pain, no chills, no constipation, no cough, no diarrhea, no dysuria, no fever, no hematuria, no nausea, no shortness of breath, no sore throat and no vomiting        Prior to Admission Medications   Prescriptions Last Dose Informant Patient Reported? Taking?   Empagliflozin (Jardiance) 25 MG TABS  Self Yes No   Sig: Take 25 mg by mouth every evening   Empagliflozin 25 MG TABS  Self Yes No   Sig: Take 25 mg by mouth daily   Patient not  taking: Reported on 6/16/2024   aspirin 81 mg chewable tablet  Self Yes No   Sig: Chew 81 mg daily Takes after dinner   atorvastatin (LIPITOR) 20 mg tablet  Self Yes No   Sig: Take 20 mg by mouth daily at bedtime   cholestyramine (QUESTRAN) 4 g packet   No No   Sig: PLEASE SEE ATTACHED FOR DETAILED DIRECTIONS   dicyclomine (BENTYL) 20 mg tablet   No No   Sig: Take 1 tablet (20 mg total) by mouth 2 (two) times a day   losartan (COZAAR) 25 mg tablet  Self Yes No   Sig: Take 25 mg by mouth daily After dinner   metoprolol succinate (TOPROL-XL) 25 mg 24 hr tablet  Self Yes No   Sig: Take 25 mg by mouth daily Takes after dinner   metoprolol tartrate (LOPRESSOR) 25 mg tablet  Self Yes No   Sig: Take 25 mg by mouth daily   Patient not taking: Reported on 6/21/2024      Facility-Administered Medications: None       Past Medical History:   Diagnosis Date    Cancer (HCC)     cervical 10 years ago- chemo radiation    Diabetes mellitus (HCC)     Diverticulitis     [place] (hard of hearing)     Hypertension        Past Surgical History:   Procedure Laterality Date    CATARACT EXTRACTION      ELBOW SURGERY Right     HYSTERECTOMY      partial    TOE SURGERY      Daughter unsure side       History reviewed. No pertinent family history.  I have reviewed and agree with the history as documented.    E-Cigarette/Vaping    E-Cigarette Use Never User      E-Cigarette/Vaping Substances     Social History     Tobacco Use    Smoking status: Never    Smokeless tobacco: Never   Vaping Use    Vaping status: Never Used   Substance Use Topics    Alcohol use: Never    Drug use: Never       Review of Systems   Constitutional:  Negative for chills and fever.   HENT:  Negative for congestion, ear pain and sore throat.    Eyes:  Negative for pain and visual disturbance.   Respiratory:  Negative for cough, shortness of breath and wheezing.    Cardiovascular:  Negative for chest pain and leg swelling.   Gastrointestinal:  Negative for abdominal pain,  constipation, diarrhea, nausea and vomiting.   Genitourinary:  Positive for flank pain. Negative for dysuria, frequency, hematuria and urgency.   Musculoskeletal:  Negative for neck pain and neck stiffness.   Skin:  Negative for rash and wound.   Neurological:  Positive for weakness. Negative for numbness and headaches.   Psychiatric/Behavioral:  Negative for agitation and confusion.    All other systems reviewed and are negative.      Physical Exam  Physical Exam  Vitals and nursing note reviewed.   Constitutional:       Appearance: She is well-developed.   HENT:      Head: Normocephalic and atraumatic.   Eyes:      Pupils: Pupils are equal, round, and reactive to light.   Cardiovascular:      Rate and Rhythm: Normal rate and regular rhythm.   Pulmonary:      Effort: Pulmonary effort is normal.      Breath sounds: Normal breath sounds.   Abdominal:      General: Bowel sounds are normal.      Palpations: Abdomen is soft.      Tenderness: There is no abdominal tenderness.   Musculoskeletal:         General: Normal range of motion.      Cervical back: Normal range of motion and neck supple.   Skin:     General: Skin is warm and dry.   Neurological:      General: No focal deficit present.      Mental Status: She is alert and oriented to person, place, and time.      Comments: No focal deficits         Vital Signs  ED Triage Vitals [08/09/24 1545]   Temperature Pulse Respirations Blood Pressure SpO2   97.7 °F (36.5 °C) 86 18 164/76 98 %      Temp Source Heart Rate Source Patient Position - Orthostatic VS BP Location FiO2 (%)   Oral Monitor Sitting Left arm --      Pain Score       --           Vitals:    08/09/24 1545 08/09/24 1800   BP: 164/76    Pulse: 86 62   Patient Position - Orthostatic VS: Sitting          Visual Acuity      ED Medications  Medications   fluconazole (DIFLUCAN) tablet 200 mg (has no administration in time range)   potassium chloride oral solution 40 mEq (has no administration in time range)    sodium chloride 0.9 % bolus 1,000 mL (1,000 mL Intravenous New Bag 8/9/24 1706)   iohexol (OMNIPAQUE) 350 MG/ML injection (MULTI-DOSE) 100 mL (100 mL Intravenous Given 8/9/24 1750)       Diagnostic Studies  Results Reviewed       Procedure Component Value Units Date/Time    HS Troponin I 2hr [[id number]]  (Normal) Collected: 08/09/24 1903    Lab Status: Final result Specimen: Blood from Arm, Right Updated: 08/09/24 1932     hs TnI 2hr 5 ng/L      Delta 2hr hsTnI 0 ng/L     HS Troponin I 4hr [[id number]]     Lab Status: No result Specimen: Blood     Urine Microscopic [[id number]]  (Abnormal) Collected: 08/09/24 1821    Lab Status: Final result Specimen: Urine, Clean Catch Updated: 08/09/24 1836     RBC, UA Innumerable /hpf      WBC, UA Innumerable /hpf      Epithelial Cells Occasional /hpf      Bacteria, UA Occasional /hpf      MUCUS THREADS Occasional     Budding Yeast Present    Urine culture [[id number]] Collected: 08/09/24 1821    Lab Status: In process Specimen: Urine, Clean Catch Updated: 08/09/24 1836    UA w Reflex to Microscopic w Reflex to Culture [[id number]]  (Abnormal) Collected: 08/09/24 1821    Lab Status: Final result Specimen: Urine, Clean Catch Updated: 08/09/24 1831     Color, UA Yellow     Clarity, UA Extra Turbid     Specific Gravity, UA 1.049     pH, UA 6.0     Leukocytes, UA Large     Nitrite, UA Negative     Protein, UA 50 (1+) mg/dl      Glucose, UA >=1000 (1%) mg/dl      Ketones, UA Negative mg/dl      Urobilinogen, UA <2.0 mg/dl      Bilirubin, UA Negative     Occult Blood, UA Moderate    Procalcitonin [[id number]]  (Normal) Collected: 08/09/24 1657    Lab Status: Final result Specimen: Blood from Arm, Right Updated: 08/09/24 1735     Procalcitonin 0.06 ng/ml     HS Troponin 0hr (reflex protocol) [[id number]]  (Normal) Collected: 08/09/24 1657    Lab Status: Final result Specimen: Blood from Arm, Right Updated: 08/09/24 1732     hs TnI 0hr 5 ng/L     Lactic acid [[id number]]  (Normal) Collected:  08/09/24 1657    Lab Status: Final result Specimen: Blood from Arm, Right Updated: 08/09/24 1726     LACTIC ACID 1.7 mmol/L     Narrative:      Result may be elevated if tourniquet was used during collection.    Protime-INR [[id number]]  (Normal) Collected: 08/09/24 1657    Lab Status: Final result Specimen: Blood from Arm, Right Updated: 08/09/24 1724     Protime 14.5 seconds      INR 1.06    Narrative:      INR Therapeutic Range    Indication                                             INR Range      Atrial Fibrillation                                               2.0-3.0  Hypercoagulable State                                    2.0.2.3  Left Ventricular Asist Device                            2.0-3.0  Mechanical Heart Valve                                  -    Aortic(with afib, MI, embolism, HF, LA enlargement,    and/or coagulopathy)                                     2.0-3.0 (2.5-3.5)     Mitral                                                             2.5-3.5  Prosthetic/Bioprosthetic Heart Valve               2.0-3.0  Venous thromboembolism (VTE: VT, PE        2.0-3.0    APTT [[id number]]  (Normal) Collected: 08/09/24 1657    Lab Status: Final result Specimen: Blood from Arm, Right Updated: 08/09/24 1724     PTT 29 seconds     Comprehensive metabolic panel [[id number]]  (Abnormal) Collected: 08/09/24 1654    Lab Status: Final result Specimen: Blood from Arm, Right Updated: 08/09/24 1714     Sodium 140 mmol/L      Potassium 3.0 mmol/L      Chloride 104 mmol/L      CO2 25 mmol/L      ANION GAP 11 mmol/L      BUN 15 mg/dL      Creatinine 0.83 mg/dL      Glucose 136 mg/dL      Calcium 8.6 mg/dL      AST 10 U/L      ALT 7 U/L      Alkaline Phosphatase 91 U/L      Total Protein 7.1 g/dL      Albumin 3.9 g/dL      Total Bilirubin 0.92 mg/dL      eGFR 64 ml/min/1.73sq m     Narrative:      National Kidney Disease Foundation guidelines for Chronic Kidney Disease (CKD):     Stage 1 with normal or high GFR (GFR > 90  mL/min/1.73 square meters)    Stage 2 Mild CKD (GFR = 60-89 mL/min/1.73 square meters)    Stage 3A Moderate CKD (GFR = 45-59 mL/min/1.73 square meters)    Stage 3B Moderate CKD (GFR = 30-44 mL/min/1.73 square meters)    Stage 4 Severe CKD (GFR = 15-29 mL/min/1.73 square meters)    Stage 5 End Stage CKD (GFR <15 mL/min/1.73 square meters)  Note: GFR calculation is accurate only with a steady state creatinine    Lipase [[id number]]  (Abnormal) Collected: 08/09/24 1654    Lab Status: Final result Specimen: Blood from Arm, Right Updated: 08/09/24 1714     Lipase <6 u/L     Blood culture #1 [[id number]] Collected: 08/09/24 1657    Lab Status: In process Specimen: Blood from Arm, Left Updated: 08/09/24 1707    Blood culture #2 [[id number]] Collected: 08/09/24 1657    Lab Status: In process Specimen: Blood from Arm, Right Updated: 08/09/24 1707    CBC and differential [[id number]]  (Abnormal) Collected: 08/09/24 1654    Lab Status: Final result Specimen: Blood from Arm, Right Updated: 08/09/24 1659     WBC 10.74 Thousand/uL      RBC 5.06 Million/uL      Hemoglobin 14.1 g/dL      Hematocrit 42.3 %      MCV 84 fL      MCH 27.9 pg      MCHC 33.3 g/dL      RDW 15.2 %      MPV 8.6 fL      Platelets 256 Thousands/uL      nRBC 0 /100 WBCs      Segmented % 77 %      Immature Grans % 1 %      Lymphocytes % 14 %      Monocytes % 7 %      Eosinophils Relative 1 %      Basophils Relative 0 %      Absolute Neutrophils 8.21 Thousands/µL      Absolute Immature Grans 0.07 Thousand/uL      Absolute Lymphocytes 1.55 Thousands/µL      Absolute Monocytes 0.80 Thousand/µL      Eosinophils Absolute 0.08 Thousand/µL      Basophils Absolute 0.03 Thousands/µL                    CT abdomen pelvis with contrast   Final Result by Sebastián Patel MD (08/09 1838)         1. Findings suggestive of cystitis.   2. No evidence of pyelonephritis or obstructive uropathy.         Workstation performed: [id number]                    Procedures  Procedures          ED Course  ED Course as of 08/09/24 2137   Fri Aug 09, 2024   1648 Generalized weakness and R flank pain - couldn't get off the toilet. Fatigue. No fever. Today started complaining of right flank pain. 2 weeks ago had a kidney infection. Finished 10 days worth. Doing well until today. Had an colonoscopy on Monday. 10 lb weight loss over 2 weeks    2136 Spoke with Treva from urology - recommends placing patient on fluconazole 200mg QD for 5-7 days and follow up in the office as outpatient                  Identification of Seniors at Risk      Flowsheet Row Most Recent Value   (ISAR) Identification of Seniors at Risk    Before the illness or injury that brought you to the Emergency, did you need someone to help you on a regular basis? 0 Filed at: 08/09/2024 1549   In the last 24 hours, have you needed more help than usual? 0 Filed at: 08/09/2024 1549   Have you been hospitalized for one or more nights during the past 6 months? 0 Filed at: 08/09/2024 1549   In general, do you see well? 0 Filed at: 08/09/2024 1549   In general, do you have serious problems with your memory? 0 Filed at: 08/09/2024 1549   Do you take more than three different medications every day? 0 Filed at: 08/09/2024 1549   ISAR Score 0 Filed at: 08/09/2024 1549                        SBIRT 20yo+      Flowsheet Row Most Recent Value   Initial Alcohol Screen: US AUDIT-C     1. How often do you have a drink containing alcohol? 0 Filed at: 08/09/2024 1549   2. How many drinks containing alcohol do you have on a typical day you are drinking?  0 Filed at: 08/09/2024 1549   3b. FEMALE Any Age, or MALE 65+: How often do you have 4 or more drinks on one occassion? 0 Filed at: 08/09/2024 1549   Audit-C Score 0 Filed at: 08/09/2024 1549   DIVYA: How many times in the past year have you...    Used an illegal drug or used a prescription medication for non-medical reasons? Never Filed at: 08/09/2024 1549                      Medical Decision Making  83 y/o  female with weakness, fatigue, and flank pain- will get labs, ct scan, and reassess     Amount and/or Complexity of Data Reviewed  Labs: ordered.  Radiology: ordered.    Risk  Prescription drug management.                 Disposition  Final diagnoses:   UTI (urinary tract infection)     Time reflects when diagnosis was documented in both MDM as applicable and the Disposition within this note       Time User Action Codes Description Comment    8/9/2024  9:16 PM Edda Christian Add [N39.0] UTI (urinary tract infection)           ED Disposition       ED Disposition   Discharge    Condition   Stable    Date/Time   Fri Aug 9, 2024 2116    Comment   Violet Dick discharge to home/self care.                   Follow-up Information       Follow up With Specialties Details Why Contact Info Additional Information    [facility] Call in 1 day for follow up within 1 week [address]  [address]  [facility] [address] [phone] [facility] [place], [address], [place], [place], [address] [phone]    [facility] Emergency Department Emergency Medicine Go to  immediately for any new or worsening symptoms [address] [facility] [address] [phone] [facility] Department, [address] [place], [place], [address]            Patient's Medications   Discharge Prescriptions    FLUCONAZOLE (DIFLUCAN) 200 MG TABLET    Take 1 tablet (200 mg total) by mouth daily for 7 days       Start Date: 8/9/2024  End Date: 8/16/2024       Order Dose: 200 mg       Quantity: 7 tablet    Refills: 0       No discharge procedures on file.    PDMP Review       None            ED Provider  Electronically Signed by             Edda Christian DO  08/09/24 5813

## 2024-08-10 LAB
ATRIAL RATE: 71 BPM
BACTERIA UR CULT: NORMAL
P AXIS: 83 DEGREES
PR INTERVAL: 190 MS
QRS AXIS: -18 DEGREES
QRSD INTERVAL: 92 MS
QT INTERVAL: 424 MS
QTC INTERVAL: 460 MS
T WAVE AXIS: -27 DEGREES
VENTRICULAR RATE: 71 BPM

## 2024-08-10 PROCEDURE — 93010 ELECTROCARDIOGRAM REPORT: CPT | Performed by: INTERNAL MEDICINE

## 2024-08-14 LAB
BACTERIA BLD CULT: NORMAL
BACTERIA BLD CULT: NORMAL

## 2024-08-14 RX ORDER — DONEPEZIL HYDROCHLORIDE 10 MG/1
TABLET, FILM COATED ORAL
COMMUNITY
Start: 2024-08-02

## 2024-08-16 ENCOUNTER — OFFICE VISIT (OUTPATIENT)
Dept: UROLOGY | Facility: CLINIC | Age: 84
End: 2024-08-16
Payer: MEDICARE

## 2024-08-16 ENCOUNTER — TELEPHONE (OUTPATIENT)
Dept: UROLOGY | Facility: CLINIC | Age: 84
End: 2024-08-16

## 2024-08-16 VITALS
HEART RATE: 96 BPM | SYSTOLIC BLOOD PRESSURE: 130 MMHG | TEMPERATURE: 97.4 F | DIASTOLIC BLOOD PRESSURE: 80 MMHG | WEIGHT: 134 LBS | OXYGEN SATURATION: 98 % | BODY MASS INDEX: 27.01 KG/M2 | HEIGHT: 59 IN

## 2024-08-16 DIAGNOSIS — R31.29 MICROHEMATURIA: Primary | ICD-10-CM

## 2024-08-16 LAB — POST-VOID RESIDUAL VOLUME, ML POC: 10 ML

## 2024-08-16 PROCEDURE — 99203 OFFICE O/P NEW LOW 30 MIN: CPT | Performed by: PHYSICIAN ASSISTANT

## 2024-08-16 PROCEDURE — 51798 US URINE CAPACITY MEASURE: CPT | Performed by: PHYSICIAN ASSISTANT

## 2024-08-16 NOTE — PROGRESS NOTES
8/16/2024      Chief Complaint   Patient presents with    Follow-up         Assessment and Plan    84 y.o. female new patient     Microhematuria  - See below   - Unable to provide urine sample. PVR 10 mL   - Urine culture from 8/9/24 negative. UA micro showing innumerable RBCs  - No imaging findings to explain prior episodes of flank pain. Consider MSK source  - Discussed complete work-up with cystoscopy for hematuria which she would like to pursue at this time.   - Recommend adequate hydration, avoiding constipation, timed voiding, cranberry supplementation, and probiotic for UTI prevention/urinary health       History of Present Illness  Violet Dick is a 84 y.o. female here for new patient evaluation of questionable UTI.  She was seen initially in [facility] ED last month with left flank pain. Urine culture at that time showing Candida glabrata. She was treated with Vantin for presumed pyelonephritis and then more recently prescribed Diflucan x7 days. Her only symptom was flank pain. No fevers or urinary symptoms. More recently seen in the ED again on 8/9/24 with right flank pain, weakness and fatigue. CT showing findings suggestive of cystitis, otherwise unremarkable. Urine culture ultimately negative and UA micro showing innumerable RBCs. Currently no complaints. No history of recurrent UTIs. No dysuria or episodes of gross hematuria. No prior  surgeries. No family history of  malignancy. No smoking history.     Review of Systems   Constitutional:  Negative for chills and fever.   Respiratory:  Negative for shortness of breath.    Cardiovascular:  Negative for chest pain.   Gastrointestinal:  Negative for abdominal pain and constipation.   Genitourinary:  Negative for difficulty urinating, dysuria, flank pain, frequency, hematuria, pelvic pain and urgency.   Neurological:  Negative for dizziness.                  Past Medical History  Past Medical History:   Diagnosis Date    Cancer (HCC)      cervical 10 years ago- chemo radiation    Diabetes mellitus (HCC)     Diverticulitis     [place] (hard of hearing)     Hypertension        Past Social History  Past Surgical History:   Procedure Laterality Date    CATARACT EXTRACTION      ELBOW SURGERY Right     HYSTERECTOMY      partial    TOE SURGERY      Daughter unsure side     Social History     Tobacco Use   Smoking Status Never   Smokeless Tobacco Never       Past Family History  History reviewed. No pertinent family history.    Past Social history  Social History     Socioeconomic History    Marital status:      Spouse name: Not on file    Number of children: Not on file    Years of education: Not on file    Highest education level: Not on file   Occupational History    Not on file   Tobacco Use    Smoking status: Never    Smokeless tobacco: Never   Vaping Use    Vaping status: Never Used   Substance and Sexual Activity    Alcohol use: Never    Drug use: Never    Sexual activity: Not Currently   Other Topics Concern    Not on file   Social History Narrative    Not on file     Social Determinants of Health     Financial Resource Strain: Not on file   Food Insecurity: No Food Insecurity (6/18/2024)    Hunger Vital Sign     Worried About Running Out of Food in the Last Year: Never true     Ran Out of Food in the Last Year: Never true   Transportation Needs: No Transportation Needs (6/18/2024)    PRAPARE - Transportation     Lack of Transportation (Medical): No     Lack of Transportation (Non-Medical): No   Physical Activity: Not on file   Stress: Not on file   Social Connections: Not on file   Intimate Partner Violence: Not on file   Housing Stability: Low Risk  (6/18/2024)    Housing Stability Vital Sign     Unable to Pay for Housing in the Last Year: No     Number of Times Moved in the Last Year: 0     Homeless in the Last Year: No       Current Medications  Current Outpatient Medications   Medication Sig Dispense Refill    aspirin 81 mg chewable tablet  "Chew 81 mg daily Takes after dinner      atorvastatin (LIPITOR) 20 mg tablet Take 20 mg by mouth daily at bedtime      cholestyramine (QUESTRAN) 4 g packet PLEASE SEE ATTACHED FOR DETAILED DIRECTIONS 200 packet 1    dicyclomine (BENTYL) 20 mg tablet Take 1 tablet (20 mg total) by mouth 2 (two) times a day 20 tablet 0    donepezil (ARICEPT) 10 mg tablet take 1 tablet by mouth every day at night      Empagliflozin (Jardiance) 25 MG TABS Take 25 mg by mouth every evening      fluconazole (DIFLUCAN) 200 mg tablet Take 1 tablet (200 mg total) by mouth daily for 7 days 7 tablet 0    losartan (COZAAR) 25 mg tablet Take 25 mg by mouth daily After dinner      metoprolol succinate (TOPROL-XL) 25 mg 24 hr tablet Take 25 mg by mouth daily Takes after dinner      PSYLLIUM HUSK PO Take by mouth      Empagliflozin 25 MG TABS Take 25 mg by mouth daily (Patient not taking: Reported on 6/16/2024)      metoprolol tartrate (LOPRESSOR) 25 mg tablet Take 25 mg by mouth daily (Patient not taking: Reported on 6/21/2024)       No current facility-administered medications for this visit.       Allergies  No Known Allergies      The following portions of the patient's history were reviewed and updated as appropriate: allergies, current medications, past medical history, past social history, past surgical history and problem list.      Vitals  Vitals:    08/16/24 1259   BP: 130/80   BP Location: Right arm   Patient Position: Sitting   Cuff Size: Standard   Pulse: 96   Temp: (!) 97.4 °F (36.3 °C)   TempSrc: Temporal   SpO2: 98%   Weight: 60.8 kg (134 lb)   Height: 4' 11\" (1.499 m)           Physical Exam  Physical Exam  Constitutional:       Appearance: Normal appearance.   HENT:      Head: Normocephalic and atraumatic.      Right Ear: External ear normal.      Left Ear: External ear normal.      Ears:      Comments: Hard of hearing accompanied by daughter who assists with providing history      Nose: Nose normal.   Eyes:      General: No " "scleral icterus.     Conjunctiva/sclera: Conjunctivae normal.   Cardiovascular:      Pulses: Normal pulses.   Pulmonary:      Effort: Pulmonary effort is normal.   Musculoskeletal:         General: Normal range of motion.      Cervical back: Normal range of motion.   Neurological:      General: No focal deficit present.      Mental Status: She is alert and oriented to person, place, and time.   Psychiatric:         Mood and Affect: Mood normal.         Behavior: Behavior normal.         Thought Content: Thought content normal.         Judgment: Judgment normal.           Results  Recent Results (from the past 1 hour(s))   POCT Measure PVR    Collection Time: 08/16/24  1:07 PM   Result Value Ref Range    POST-VOID RESIDUAL VOLUME, ML POC 10 mL   ]  No results found for: \"PSA\"  Lab Results   Component Value Date    CALCIUM 8.6 08/09/2024    K 3.0 (L) 08/09/2024    CO2 25 08/09/2024     08/09/2024    BUN 15 08/09/2024    CREATININE 0.83 08/09/2024     Lab Results   Component Value Date    WBC 10.74 (H) 08/09/2024    HGB 14.1 08/09/2024    HCT 42.3 08/09/2024    MCV 84 08/09/2024     08/09/2024           Orders  Orders Placed This Encounter   Procedures    POCT Measure PVR     Betsy Yin    "

## 2024-09-01 ENCOUNTER — HOSPITAL ENCOUNTER (INPATIENT)
Facility: HOSPITAL | Age: 84
LOS: 1 days | Discharge: HOME | DRG: 392 | End: 2024-09-03
Attending: EMERGENCY MEDICINE | Admitting: STUDENT IN AN ORGANIZED HEALTH CARE EDUCATION/TRAINING PROGRAM
Payer: MEDICARE

## 2024-09-01 ENCOUNTER — APPOINTMENT (EMERGENCY)
Dept: CT IMAGING | Facility: HOSPITAL | Age: 84
DRG: 392 | End: 2024-09-01
Payer: MEDICARE

## 2024-09-01 DIAGNOSIS — E83.42 HYPOMAGNESEMIA: ICD-10-CM

## 2024-09-01 DIAGNOSIS — K56.699 STRICTURE OF SIGMOID COLON (HCC): ICD-10-CM

## 2024-09-01 DIAGNOSIS — R19.7 DIARRHEA: ICD-10-CM

## 2024-09-01 DIAGNOSIS — E87.6 HYPOKALEMIA: ICD-10-CM

## 2024-09-01 DIAGNOSIS — R63.4 WEIGHT LOSS: ICD-10-CM

## 2024-09-01 DIAGNOSIS — K52.9 COLITIS: Primary | ICD-10-CM

## 2024-09-01 PROBLEM — E87.20 LACTIC ACIDOSIS: Status: ACTIVE | Noted: 2024-09-01

## 2024-09-01 PROBLEM — E86.0 DEHYDRATION DETERMINED BY EXAMINATION: Status: ACTIVE | Noted: 2024-09-01

## 2024-09-01 PROBLEM — K59.1 FUNCTIONAL DIARRHEA: Status: ACTIVE | Noted: 2024-09-01

## 2024-09-01 LAB
ALBUMIN SERPL BCG-MCNC: 3.8 G/DL (ref 3.5–5)
ALP SERPL-CCNC: 82 U/L (ref 34–104)
ALT SERPL W P-5'-P-CCNC: 7 U/L (ref 7–52)
ANION GAP SERPL CALCULATED.3IONS-SCNC: 12 MMOL/L (ref 4–13)
AST SERPL W P-5'-P-CCNC: 12 U/L (ref 13–39)
BASOPHILS # BLD AUTO: 0.05 THOUSANDS/ÂΜL (ref 0–0.1)
BASOPHILS NFR BLD AUTO: 1 % (ref 0–1)
BILIRUB DIRECT SERPL-MCNC: 0.09 MG/DL (ref 0–0.2)
BILIRUB SERPL-MCNC: 0.95 MG/DL (ref 0.2–1)
BUN SERPL-MCNC: 15 MG/DL (ref 5–25)
CALCIUM SERPL-MCNC: 8.1 MG/DL (ref 8.4–10.2)
CARDIAC TROPONIN I PNL SERPL HS: 4 NG/L
CHLORIDE SERPL-SCNC: 100 MMOL/L (ref 96–108)
CO2 SERPL-SCNC: 27 MMOL/L (ref 21–32)
CREAT SERPL-MCNC: 0.83 MG/DL (ref 0.6–1.3)
EOSINOPHIL # BLD AUTO: 0.09 THOUSAND/ÂΜL (ref 0–0.61)
EOSINOPHIL NFR BLD AUTO: 1 % (ref 0–6)
ERYTHROCYTE [DISTWIDTH] IN BLOOD BY AUTOMATED COUNT: 15 % (ref 11.6–15.1)
GFR SERPL CREATININE-BSD FRML MDRD: 64 ML/MIN/1.73SQ M
GLUCOSE SERPL-MCNC: 111 MG/DL (ref 65–140)
GLUCOSE SERPL-MCNC: 159 MG/DL (ref 65–140)
HCT VFR BLD AUTO: 45.7 % (ref 34.8–46.1)
HGB BLD-MCNC: 14.8 G/DL (ref 11.5–15.4)
IMM GRANULOCYTES # BLD AUTO: 0.05 THOUSAND/UL (ref 0–0.2)
IMM GRANULOCYTES NFR BLD AUTO: 1 % (ref 0–2)
LACTATE SERPL-SCNC: 1.3 MMOL/L (ref 0.5–2)
LACTATE SERPL-SCNC: 2.9 MMOL/L (ref 0.5–2)
LYMPHOCYTES # BLD AUTO: 1.38 THOUSANDS/ÂΜL (ref 0.6–4.47)
LYMPHOCYTES NFR BLD AUTO: 15 % (ref 14–44)
MCH RBC QN AUTO: 27.6 PG (ref 26.8–34.3)
MCHC RBC AUTO-ENTMCNC: 32.4 G/DL (ref 31.4–37.4)
MCV RBC AUTO: 85 FL (ref 82–98)
MONOCYTES # BLD AUTO: 0.73 THOUSAND/ÂΜL (ref 0.17–1.22)
MONOCYTES NFR BLD AUTO: 8 % (ref 4–12)
NEUTROPHILS # BLD AUTO: 7.13 THOUSANDS/ÂΜL (ref 1.85–7.62)
NEUTS SEG NFR BLD AUTO: 74 % (ref 43–75)
NRBC BLD AUTO-RTO: 0 /100 WBCS
PLATELET # BLD AUTO: 305 THOUSANDS/UL (ref 149–390)
PMV BLD AUTO: 8.9 FL (ref 8.9–12.7)
POTASSIUM SERPL-SCNC: 2.7 MMOL/L (ref 3.5–5.3)
PROT SERPL-MCNC: 7.2 G/DL (ref 6.4–8.4)
RBC # BLD AUTO: 5.36 MILLION/UL (ref 3.81–5.12)
SODIUM SERPL-SCNC: 139 MMOL/L (ref 135–147)
WBC # BLD AUTO: 9.43 THOUSAND/UL (ref 4.31–10.16)

## 2024-09-01 PROCEDURE — 85025 COMPLETE CBC W/AUTO DIFF WBC: CPT | Performed by: EMERGENCY MEDICINE

## 2024-09-01 PROCEDURE — 87493 C DIFF AMPLIFIED PROBE: CPT | Performed by: EMERGENCY MEDICINE

## 2024-09-01 PROCEDURE — 80076 HEPATIC FUNCTION PANEL: CPT | Performed by: EMERGENCY MEDICINE

## 2024-09-01 PROCEDURE — 83605 ASSAY OF LACTIC ACID: CPT | Performed by: EMERGENCY MEDICINE

## 2024-09-01 PROCEDURE — 96361 HYDRATE IV INFUSION ADD-ON: CPT

## 2024-09-01 PROCEDURE — 74176 CT ABD & PELVIS W/O CONTRAST: CPT

## 2024-09-01 PROCEDURE — 82948 REAGENT STRIP/BLOOD GLUCOSE: CPT

## 2024-09-01 PROCEDURE — 36415 COLL VENOUS BLD VENIPUNCTURE: CPT | Performed by: EMERGENCY MEDICINE

## 2024-09-01 PROCEDURE — 80048 BASIC METABOLIC PNL TOTAL CA: CPT | Performed by: EMERGENCY MEDICINE

## 2024-09-01 PROCEDURE — 99222 1ST HOSP IP/OBS MODERATE 55: CPT | Performed by: FAMILY MEDICINE

## 2024-09-01 PROCEDURE — 99284 EMERGENCY DEPT VISIT MOD MDM: CPT

## 2024-09-01 PROCEDURE — 99285 EMERGENCY DEPT VISIT HI MDM: CPT | Performed by: EMERGENCY MEDICINE

## 2024-09-01 PROCEDURE — 96360 HYDRATION IV INFUSION INIT: CPT

## 2024-09-01 PROCEDURE — 84484 ASSAY OF TROPONIN QUANT: CPT | Performed by: EMERGENCY MEDICINE

## 2024-09-01 RX ORDER — ALPRAZOLAM 0.25 MG
0.25 TABLET ORAL 2 TIMES DAILY PRN
Status: DISCONTINUED | OUTPATIENT
Start: 2024-09-01 | End: 2024-09-03 | Stop reason: HOSPADM

## 2024-09-01 RX ORDER — POTASSIUM CHLORIDE 1500 MG/1
40 TABLET, EXTENDED RELEASE ORAL ONCE
Status: COMPLETED | OUTPATIENT
Start: 2024-09-01 | End: 2024-09-01

## 2024-09-01 RX ORDER — HYDRALAZINE HYDROCHLORIDE 20 MG/ML
10 INJECTION INTRAMUSCULAR; INTRAVENOUS EVERY 6 HOURS PRN
Status: DISCONTINUED | OUTPATIENT
Start: 2024-09-01 | End: 2024-09-03 | Stop reason: HOSPADM

## 2024-09-01 RX ORDER — CHOLESTYRAMINE LIGHT 4 G/5.7G
4 POWDER, FOR SUSPENSION ORAL 2 TIMES DAILY
Status: DISCONTINUED | OUTPATIENT
Start: 2024-09-01 | End: 2024-09-03 | Stop reason: HOSPADM

## 2024-09-01 RX ORDER — ASPIRIN 81 MG/1
81 TABLET, CHEWABLE ORAL DAILY
Status: DISCONTINUED | OUTPATIENT
Start: 2024-09-02 | End: 2024-09-03 | Stop reason: HOSPADM

## 2024-09-01 RX ORDER — METOPROLOL SUCCINATE 25 MG/1
25 TABLET, EXTENDED RELEASE ORAL DAILY
Status: DISCONTINUED | OUTPATIENT
Start: 2024-09-02 | End: 2024-09-03 | Stop reason: HOSPADM

## 2024-09-01 RX ORDER — LANOLIN ALCOHOL/MO/W.PET/CERES
3 CREAM (GRAM) TOPICAL
Status: DISCONTINUED | OUTPATIENT
Start: 2024-09-01 | End: 2024-09-03 | Stop reason: HOSPADM

## 2024-09-01 RX ORDER — DONEPEZIL HYDROCHLORIDE 5 MG/1
10 TABLET, FILM COATED ORAL
Status: DISCONTINUED | OUTPATIENT
Start: 2024-09-01 | End: 2024-09-03 | Stop reason: HOSPADM

## 2024-09-01 RX ORDER — LOSARTAN POTASSIUM 25 MG/1
25 TABLET ORAL DAILY
Status: DISCONTINUED | OUTPATIENT
Start: 2024-09-02 | End: 2024-09-03 | Stop reason: HOSPADM

## 2024-09-01 RX ORDER — ATORVASTATIN CALCIUM 20 MG/1
20 TABLET, FILM COATED ORAL
Status: DISCONTINUED | OUTPATIENT
Start: 2024-09-01 | End: 2024-09-03 | Stop reason: HOSPADM

## 2024-09-01 RX ORDER — SODIUM CHLORIDE, SODIUM LACTATE, POTASSIUM CHLORIDE, CALCIUM CHLORIDE 600; 310; 30; 20 MG/100ML; MG/100ML; MG/100ML; MG/100ML
75 INJECTION, SOLUTION INTRAVENOUS CONTINUOUS
Status: DISCONTINUED | OUTPATIENT
Start: 2024-09-02 | End: 2024-09-03 | Stop reason: HOSPADM

## 2024-09-01 RX ORDER — INSULIN LISPRO 100 [IU]/ML
1-5 INJECTION, SOLUTION INTRAVENOUS; SUBCUTANEOUS
Status: DISCONTINUED | OUTPATIENT
Start: 2024-09-02 | End: 2024-09-03 | Stop reason: HOSPADM

## 2024-09-01 RX ORDER — INSULIN LISPRO 100 [IU]/ML
1-5 INJECTION, SOLUTION INTRAVENOUS; SUBCUTANEOUS
Status: DISCONTINUED | OUTPATIENT
Start: 2024-09-01 | End: 2024-09-03 | Stop reason: HOSPADM

## 2024-09-01 RX ADMIN — ATORVASTATIN CALCIUM 20 MG: 20 TABLET, FILM COATED ORAL at 21:51

## 2024-09-01 RX ADMIN — ALPRAZOLAM 0.25 MG: 0.25 TABLET ORAL at 23:34

## 2024-09-01 RX ADMIN — DONEPEZIL HYDROCHLORIDE 10 MG: 5 TABLET ORAL at 21:51

## 2024-09-01 RX ADMIN — SODIUM CHLORIDE 1000 ML: 0.9 INJECTION, SOLUTION INTRAVENOUS at 15:33

## 2024-09-01 RX ADMIN — POTASSIUM CHLORIDE 40 MEQ: 1500 TABLET, EXTENDED RELEASE ORAL at 18:07

## 2024-09-01 RX ADMIN — Medication 3 MG: at 23:34

## 2024-09-01 NOTE — ED PROVIDER NOTES
History  Chief Complaint   Patient presents with    Diarrhea     Patient arrived to ER c/o diarrhea started 2 days ago, worsening the last 24 hours. Patient is became stool incontinent and stool is coming out like water. Patient has no appetite. Denies any blood in stool.      83 yo female with several months of progressively worsening diarrhea and more than 20lb weight loss. Admitted here in June with documented weight of 151lbs. Was recently treated with pyelonephritis. No fever. No vomiting. Associated poor PO intake. History from daughter at bedside.         Prior to Admission Medications   Prescriptions Last Dose Informant Patient Reported? Taking?   Empagliflozin (Jardiance) 25 MG TABS 8/31/2024 Self, Child Yes Yes   Sig: Take 25 mg by mouth every evening   Empagliflozin 25 MG TABS Not Taking Self, Child Yes No   Sig: Take 25 mg by mouth daily   Patient not taking: Reported on 6/16/2024   PSYLLIUM HUSK PO Not Taking Self, Child Yes No   Sig: Take by mouth   Patient not taking: Reported on 9/1/2024   aspirin 81 mg chewable tablet 8/31/2024 Self, Child Yes Yes   Sig: Chew 81 mg daily Takes after dinner   atorvastatin (LIPITOR) 20 mg tablet 8/31/2024 Self, Child Yes Yes   Sig: Take 20 mg by mouth daily at bedtime   cholestyramine (QUESTRAN) 4 g packet Not Taking Self, Child No No   Sig: PLEASE SEE ATTACHED FOR DETAILED DIRECTIONS   Patient not taking: Reported on 9/1/2024   dicyclomine (BENTYL) 20 mg tablet Not Taking Self, Child No No   Sig: Take 1 tablet (20 mg total) by mouth 2 (two) times a day   Patient not taking: Reported on 9/1/2024   donepezil (ARICEPT) 10 mg tablet 8/31/2024 Self, Child Yes Yes   Sig: take 1 tablet by mouth every day at night   losartan (COZAAR) 25 mg tablet 8/31/2024 Self, Child Yes Yes   Sig: Take 25 mg by mouth daily After dinner   metoprolol succinate (TOPROL-XL) 25 mg 24 hr tablet 8/31/2024 Self, Child Yes Yes   Sig: Take 25 mg by mouth daily Takes after dinner   metoprolol  tartrate (LOPRESSOR) 25 mg tablet Not Taking Self, Child Yes No   Sig: Take 25 mg by mouth daily   Patient not taking: Reported on 6/21/2024      Facility-Administered Medications: None       Past Medical History:   Diagnosis Date    Cancer (HCC)     cervical 10 years ago- chemo radiation    Diabetes mellitus (HCC)     Diverticulitis     [place] (hard of hearing)     Hypertension        Past Surgical History:   Procedure Laterality Date    CATARACT EXTRACTION      ELBOW SURGERY Right     HYSTERECTOMY      partial    TOE SURGERY      Daughter unsure side       History reviewed. No pertinent family history.  I have reviewed and agree with the history as documented.    E-Cigarette/Vaping    E-Cigarette Use Never User      E-Cigarette/Vaping Substances    Nicotine No     THC No     CBD No     Flavoring No     Other No     Unknown No      Social History     Tobacco Use    Smoking status: Never    Smokeless tobacco: Never   Vaping Use    Vaping status: Never Used   Substance Use Topics    Alcohol use: Never    Drug use: Never       Review of Systems   Constitutional:  Positive for appetite change and unexpected weight change.   Gastrointestinal:  Positive for diarrhea.       Physical Exam  Physical Exam  Vitals and nursing note reviewed.   Constitutional:       General: She is not in acute distress.     Appearance: Normal appearance. She is well-developed. She is not ill-appearing, toxic-appearing or diaphoretic.   HENT:      Head: Normocephalic and atraumatic.      Mouth/Throat:      Mouth: Mucous membranes are moist.      Pharynx: Oropharynx is clear.   Eyes:      Conjunctiva/sclera: Conjunctivae normal.      Pupils: Pupils are equal, round, and reactive to light.   Neck:      Vascular: No JVD.   Cardiovascular:      Rate and Rhythm: Normal rate and regular rhythm.      Pulses: Normal pulses.      Heart sounds: Normal heart sounds.   Pulmonary:      Effort: Pulmonary effort is normal. No respiratory distress.      Breath  sounds: Normal breath sounds. No stridor.   Abdominal:      General: There is no distension.      Palpations: Abdomen is soft.      Tenderness: There is no abdominal tenderness. There is no guarding or rebound.   Musculoskeletal:         General: No tenderness or deformity. Normal range of motion.      Cervical back: Normal range of motion and neck supple. No rigidity.   Skin:     General: Skin is warm and dry.      Capillary Refill: Capillary refill takes less than 2 seconds.      Coloration: Skin is not jaundiced or pale.      Findings: No bruising, erythema or rash.   Neurological:      General: No focal deficit present.      Mental Status: She is alert.      Cranial Nerves: No cranial nerve deficit.      Sensory: No sensory deficit.      Motor: No weakness or abnormal muscle tone.      Coordination: Coordination normal.      Gait: Gait normal.         Vital Signs  ED Triage Vitals   Temperature Pulse Respirations Blood Pressure SpO2   09/01/24 1454 09/01/24 1454 09/01/24 1454 09/01/24 1454 09/01/24 1454   (!) 96 °F (35.6 °C) 83 20 142/67 98 %      Temp Source Heart Rate Source Patient Position - Orthostatic VS BP Location FiO2 (%)   09/01/24 1454 09/01/24 1753 09/01/24 1753 09/01/24 1753 --   Temporal Monitor Lying Right arm       Pain Score       09/01/24 1914       No Pain           Vitals:    09/01/24 1454 09/01/24 1753 09/01/24 1914   BP: 142/67  (!) 178/75   Pulse: 83 58 59   Patient Position - Orthostatic VS:  Lying Lying         Visual Acuity      ED Medications  Medications   aspirin chewable tablet 81 mg (has no administration in time range)   atorvastatin (LIPITOR) tablet 20 mg (has no administration in time range)   cholestyramine sugar free (QUESTRAN LIGHT) packet 4 g (4 g Oral Not Given 9/1/24 2005)   donepezil (ARICEPT) tablet 10 mg (has no administration in time range)   losartan (COZAAR) tablet 25 mg (has no administration in time range)   metoprolol succinate (TOPROL-XL) 24 hr tablet 25 mg (has  no administration in time range)   insulin lispro (HumALOG/ADMELOG) 100 units/mL subcutaneous injection 1-5 Units (has no administration in time range)   insulin lispro (HumALOG/ADMELOG) 100 units/mL subcutaneous injection 1-5 Units (has no administration in time range)   sodium chloride 0.9 % bolus 1,000 mL (1,000 mL Intravenous New Bag 9/1/24 1533)   potassium chloride (Klor-Con M20) CR tablet 40 mEq (40 mEq Oral Given 9/1/24 1807)       Diagnostic Studies  Results Reviewed       Procedure Component Value Units Date/Time    Stool Enteric Bacterial Panel by PCR [[id number]]     Lab Status: No result Specimen: Stool     Clostridium difficile toxin by PCR with EIA [[id number]] Collected: 09/01/24 1830    Lab Status: In process Specimen: Stool from Per Rectum Updated: 09/01/24 1837    Lactic acid 2 Hours [[id number]]  (Normal) Collected: 09/01/24 1812    Lab Status: Final result Specimen: Blood from Arm, Left Updated: 09/01/24 1831     LACTIC ACID 1.3 mmol/L     Narrative:      Result may be elevated if tourniquet was used during collection.    HS Troponin 0hr (reflex protocol) [[id number]]  (Normal) Collected: 09/01/24 1531    Lab Status: Final result Specimen: Blood from Arm, Left Updated: 09/01/24 1559     hs TnI 0hr 4 ng/L     Basic metabolic panel [[id number]]  (Abnormal) Collected: 09/01/24 1531    Lab Status: Final result Specimen: Blood from Arm, Left Updated: 09/01/24 1552     Sodium 139 mmol/L      Potassium 2.7 mmol/L      Chloride 100 mmol/L      CO2 27 mmol/L      ANION GAP 12 mmol/L      BUN 15 mg/dL      Creatinine 0.83 mg/dL      Glucose 159 mg/dL      Calcium 8.1 mg/dL      eGFR 64 ml/min/1.73sq m     Narrative:      National Kid[employer] Foundation guidelines for Chronic Kidney Disease (CKD):     Stage 1 with normal or high GFR (GFR > 90 mL/min/1.73 square meters)    Stage 2 Mild CKD (GFR = 60-89 mL/min/1.73 square meters)    Stage 3A Moderate CKD (GFR = 45-59 mL/min/1.73 square meters)    Stage 3B  Moderate CKD (GFR = 30-44 mL/min/1.73 square meters)    Stage 4 Severe CKD (GFR = 15-29 mL/min/1.73 square meters)    Stage 5 End Stage CKD (GFR <15 mL/min/1.73 square meters)  Note: GFR calculation is accurate only with a steady state creatinine    Hepatic function panel [[id number]]  (Abnormal) Collected: 09/01/24 1531    Lab Status: Final result Specimen: Blood from Arm, Left Updated: 09/01/24 1552     Total Bilirubin 0.95 mg/dL      Bilirubin, Direct 0.09 mg/dL      Alkaline Phosphatase 82 U/L      AST 12 U/L      ALT 7 U/L      Total Protein 7.2 g/dL      Albumin 3.8 g/dL     Lactic acid, plasma (w/reflex if result > 2.0) [[id number]]  (Abnormal) Collected: 09/01/24 1531    Lab Status: Final result Specimen: Blood from Arm, Left Updated: 09/01/24 1551     LACTIC ACID 2.9 mmol/L     Narrative:      Result may be elevated if tourniquet was used during collection.    CBC and differential [[id number]]  (Abnormal) Collected: 09/01/24 1531    Lab Status: Final result Specimen: Blood from Arm, Left Updated: 09/01/24 1538     WBC 9.43 Thousand/uL      RBC 5.36 Million/uL      Hemoglobin 14.8 g/dL      Hematocrit 45.7 %      MCV 85 fL      MCH 27.6 pg      MCHC 32.4 g/dL      RDW 15.0 %      MPV 8.9 fL      Platelets 305 Thousands/uL      nRBC 0 /100 WBCs      Segmented % 74 %      Immature Grans % 1 %      Lymphocytes % 15 %      Monocytes % 8 %      Eosinophils Relative 1 %      Basophils Relative 1 %      Absolute Neutrophils 7.13 Thousands/µL      Absolute Immature Grans 0.05 Thousand/uL      Absolute Lymphocytes 1.38 Thousands/µL      Absolute Monocytes 0.73 Thousand/µL      Eosinophils Absolute 0.09 Thousand/µL      Basophils Absolute 0.05 Thousands/µL                    CT abdomen pelvis wo contrast   Final Result by Willie Duenas MD (09/01 1752)      Findings consistent with proctocolitis      Colonic diverticulosis      Workstation performed: [id number]                    Procedures  Procedures         ED Course                                                Medical Decision Making  Amount and/or Complexity of Data Reviewed  Labs: ordered.  Radiology: ordered.    Risk  Prescription drug management.  Decision regarding hospitalization.                 Disposition  Final diagnoses:   Colitis   Diarrhea   Hypokalemia   Weight loss     Time reflects when diagnosis was documented in both MDM as applicable and the Disposition within this note       Time User Action Codes Description Comment    9/1/2024  6:16 PM Argueta, Maurilio Add [K52.9] Colitis     9/1/2024  6:16 PM Argueta, Maurilio Add [R19.7] Diarrhea     9/1/2024  6:16 PM Argueta, Maurilio Add [E87.6] Hypokalemia     9/1/2024  6:16 PM Argueta, Maurilio Add [R63.4] Weight loss           ED Disposition       ED Disposition   Admit    Condition   Stable    Date/Time   Sun Sep 1, 2024  6:16 PM    Comment   Case was discussed with Dr Machado and the patient's admission status was agreed to be Admission Status: observation status to the service of Dr. Machado .               Follow-up Information    None         Current Discharge Medication List        CONTINUE these medications which have NOT CHANGED    Details   aspirin 81 mg chewable tablet Chew 81 mg daily Takes after dinner      atorvastatin (LIPITOR) 20 mg tablet Take 20 mg by mouth daily at bedtime      donepezil (ARICEPT) 10 mg tablet take 1 tablet by mouth every day at night      !! Empagliflozin (Jardiance) 25 MG TABS Take 25 mg by mouth every evening      losartan (COZAAR) 25 mg tablet Take 25 mg by mouth daily After dinner      metoprolol succinate (TOPROL-XL) 25 mg 24 hr tablet Take 25 mg by mouth daily Takes after dinner      cholestyramine (QUESTRAN) 4 g packet PLEASE SEE ATTACHED FOR DETAILED DIRECTIONS  Qty: 200 packet, Refills: 1    Associated Diagnoses: Diarrhea, unspecified type      dicyclomine (BENTYL) 20 mg tablet Take 1 tablet (20 mg total) by mouth 2 (two) times a day  Qty: 20 tablet, Refills: 0    Associated Diagnoses:  Diarrhea      !! Empagliflozin 25 MG TABS Take 25 mg by mouth daily      metoprolol tartrate (LOPRESSOR) 25 mg tablet Take 25 mg by mouth daily      PSYLLIUM HUSK PO Take by mouth       !! - Potential duplicate medications found. Please discuss with provider.          No discharge procedures on file.    PDMP Review       None            ED Provider  Electronically Signed by             Maurilio Argueta MD  09/01/24 3904

## 2024-09-02 PROBLEM — E78.5 HLD (HYPERLIPIDEMIA): Status: RESOLVED | Noted: 2024-06-16 | Resolved: 2024-09-02

## 2024-09-02 LAB
AFP-TM SERPL-MCNC: 2.06 NG/ML (ref 0–9)
ANION GAP SERPL CALCULATED.3IONS-SCNC: 7 MMOL/L (ref 4–13)
BUN SERPL-MCNC: 12 MG/DL (ref 5–25)
C DIFF TOX GENS STL QL NAA+PROBE: NEGATIVE
CALCIUM SERPL-MCNC: 7.6 MG/DL (ref 8.4–10.2)
CANCER AG125 SERPL-ACNC: 4.7 U/ML (ref 0–35)
CHLORIDE SERPL-SCNC: 106 MMOL/L (ref 96–108)
CO2 SERPL-SCNC: 27 MMOL/L (ref 21–32)
CREAT SERPL-MCNC: 0.7 MG/DL (ref 0.6–1.3)
ERYTHROCYTE [DISTWIDTH] IN BLOOD BY AUTOMATED COUNT: 15 % (ref 11.6–15.1)
GFR SERPL CREATININE-BSD FRML MDRD: 79 ML/MIN/1.73SQ M
GLUCOSE SERPL-MCNC: 110 MG/DL (ref 65–140)
GLUCOSE SERPL-MCNC: 110 MG/DL (ref 65–140)
GLUCOSE SERPL-MCNC: 118 MG/DL (ref 65–140)
GLUCOSE SERPL-MCNC: 184 MG/DL (ref 65–140)
GLUCOSE SERPL-MCNC: 92 MG/DL (ref 65–140)
HCT VFR BLD AUTO: 40 % (ref 34.8–46.1)
HGB BLD-MCNC: 13.3 G/DL (ref 11.5–15.4)
MCH RBC QN AUTO: 28 PG (ref 26.8–34.3)
MCHC RBC AUTO-ENTMCNC: 33.3 G/DL (ref 31.4–37.4)
MCV RBC AUTO: 84 FL (ref 82–98)
PLATELET # BLD AUTO: 224 THOUSANDS/UL (ref 149–390)
PMV BLD AUTO: 8.7 FL (ref 8.9–12.7)
POTASSIUM SERPL-SCNC: 3.2 MMOL/L (ref 3.5–5.3)
RBC # BLD AUTO: 4.75 MILLION/UL (ref 3.81–5.12)
SODIUM SERPL-SCNC: 140 MMOL/L (ref 135–147)
WBC # BLD AUTO: 6.99 THOUSAND/UL (ref 4.31–10.16)

## 2024-09-02 PROCEDURE — 82948 REAGENT STRIP/BLOOD GLUCOSE: CPT

## 2024-09-02 PROCEDURE — 85027 COMPLETE CBC AUTOMATED: CPT | Performed by: NURSE PRACTITIONER

## 2024-09-02 PROCEDURE — 80048 BASIC METABOLIC PNL TOTAL CA: CPT | Performed by: NURSE PRACTITIONER

## 2024-09-02 PROCEDURE — 82105 ALPHA-FETOPROTEIN SERUM: CPT | Performed by: NURSE PRACTITIONER

## 2024-09-02 PROCEDURE — 86304 IMMUNOASSAY TUMOR CA 125: CPT | Performed by: NURSE PRACTITIONER

## 2024-09-02 PROCEDURE — 99232 SBSQ HOSP IP/OBS MODERATE 35: CPT | Performed by: NURSE PRACTITIONER

## 2024-09-02 RX ORDER — HEPARIN SODIUM 5000 [USP'U]/ML
5000 INJECTION, SOLUTION INTRAVENOUS; SUBCUTANEOUS EVERY 8 HOURS SCHEDULED
Status: DISCONTINUED | OUTPATIENT
Start: 2024-09-02 | End: 2024-09-03 | Stop reason: HOSPADM

## 2024-09-02 RX ADMIN — LOSARTAN POTASSIUM 25 MG: 25 TABLET, FILM COATED ORAL at 09:36

## 2024-09-02 RX ADMIN — ALPRAZOLAM 0.25 MG: 0.25 TABLET ORAL at 09:48

## 2024-09-02 RX ADMIN — ASPIRIN 81 MG: 81 TABLET, CHEWABLE ORAL at 09:36

## 2024-09-02 RX ADMIN — CHOLESTYRAMINE 4 G: 4 POWDER, FOR SUSPENSION ORAL at 17:31

## 2024-09-02 RX ADMIN — HEPARIN SODIUM 5000 UNITS: 5000 INJECTION INTRAVENOUS; SUBCUTANEOUS at 21:27

## 2024-09-02 RX ADMIN — SODIUM CHLORIDE, SODIUM LACTATE, POTASSIUM CHLORIDE, AND CALCIUM CHLORIDE 75 ML/HR: .6; .31; .03; .02 INJECTION, SOLUTION INTRAVENOUS at 00:25

## 2024-09-02 RX ADMIN — DONEPEZIL HYDROCHLORIDE 10 MG: 5 TABLET ORAL at 21:27

## 2024-09-02 RX ADMIN — HEPARIN SODIUM 5000 UNITS: 5000 INJECTION INTRAVENOUS; SUBCUTANEOUS at 16:00

## 2024-09-02 RX ADMIN — METOPROLOL SUCCINATE 25 MG: 25 TABLET, EXTENDED RELEASE ORAL at 09:36

## 2024-09-02 RX ADMIN — ALPRAZOLAM 0.25 MG: 0.25 TABLET ORAL at 21:27

## 2024-09-02 RX ADMIN — ATORVASTATIN CALCIUM 20 MG: 20 TABLET, FILM COATED ORAL at 21:27

## 2024-09-02 RX ADMIN — INSULIN LISPRO 1 UNITS: 100 INJECTION, SOLUTION INTRAVENOUS; SUBCUTANEOUS at 13:43

## 2024-09-02 RX ADMIN — Medication 3 MG: at 21:27

## 2024-09-02 NOTE — ASSESSMENT & PLAN NOTE
"Patient presents today (9/1) accompanied by her daughter with reports of generalized weakness associated with multiple episodes of watery diarrhea over the past several days  Patient with similar presentation back in June 2024-workup unrevealing  Patient reports stool \"coming out like water\" over the past several days resulting in overall fatigue  Denies nausea or vomiting  Oral intake per patient's daughter has been suboptimal    In ED, patient without SIRS criteria-however, with elevated lactic acid and evidence of volume depletion on physical exam  No stooling occurred in ED-stool studies ordered-pending  Patient hemodynamically stable    Plan:  Observe under hospitalist service  Consider GI consultation if progressive/persistent diarrhea  Await stool studies-C. difficile, Campylobacter, O&P, Shigella, Shiga toxin ordered  Proceed with maintenance fluids    "

## 2024-09-02 NOTE — PROGRESS NOTES
Formerly [facility], [facility]–1983  Progress Note  Name: Violet Dick I  MRN: [id number]  Unit/Bed#: -01 I Date of Admission: 9/1/2024   Date of Service: 9/2/2024 I Hospital Day: 0    Assessment & Plan   * Dehydration determined by examination  Assessment & Plan  Patient presented to ED (9/1) accompanied by her daughter with reports of generalized weakness associated with multiple episodes of watery diarrhea over the past several days. Patient with similar presentation back in June 2024-workup unrevealing In ED, patient without SIRS criteria-however, with elevated lactic acid and evidence of volume depletion on physical exam      Plan:  Consider GI consultation if progressive/persistent diarrhea  Await stool studies-C. Difficile pending, Enteric stool study pending collection  Continue IVF  Trend work up       Diarrhea of presumed infectious origin  Assessment & Plan  Lengthy discussion with daughter at bedside  Whom is very frustrated as patient will downplay her symptoms she has been having explosive diarrhea for several months with no improvement and patient is getting weaker and losing weight.  Stool studies pending  When persistent symptoms will consult GI to see if there is further recommendations  Daughter is concerned that cancer is returning CT results reviewed with the daughter however we will also add a  and tumor marker.    Lactic acidosis  Assessment & Plan  Lactic acid normalized with IVF  Likely in setting of volume depletion       Diabetes mellitus, type 2 (HCC)  Assessment & Plan  Lab Results   Component Value Date    HGBA1C 6.6 (H) 06/16/2024       Recent Labs     09/01/24  2134 09/02/24  0811   POCGLU 111 110         Blood Sugar Average: Last 72 hrs:  (P) 110.5  Most recent A1c 6.6 indicating adequate outpatient control/compliance   hold oral medications  Continue sliding scale insulin coverage and blood glucose monitoring     HTN (hypertension)  Assessment & Plan  Mildly elevated  systolic blood pressure upon presentation  Unknown absorption of oral antihypertensives given watery diarrhea as described by patient  Continue home regimen including Cozaar, Toprol with parameters  Continue with prn hydralazine 10 mg p.o. every 6 hours as needed systolic blood pressure greater than 160               VTE Pharmacologic Prophylaxis: VTE Score: 4 Moderate Risk (Score 3-4) - Pharmacological DVT Prophylaxis Ordered: heparin.    Mobility:   Basic Mobility Inpatient Raw Score: 20  JH-HLM Goal: 6: Walk 10 steps or more  JH-HLM Achieved: 7: Walk 25 feet or more  JH-HLM Goal achieved. Continue to encourage appropriate mobility.    Patient Centered Rounds: I performed bedside rounds with nursing staff today.   Discussions with Specialists or Other Care Team Provider: Await GI recommendations    Education and Discussions with Family / Patient: Updated  (daughter) at bedside.    Total Time Spent on Date of Encounter in care of patient: 45 mins. This time was spent on one or more of the following: performing physical exam; counseling and coordination of care; obtaining or reviewing history; documenting in the medical record; reviewing/ordering tests, medications or procedures; communicating with other healthcare professionals and discussing with patient's family/caregivers.    Current Length of Stay: 0 day(s)  Current Patient Status: Observation   Certification Statement: The patient, admitted on an observation basis, will now require > 2 midnight hospital stay due to persistent diarrhea family struggling to care for her at home need for GI evaluation  Discharge Plan: Anticipate discharge in 24-48 hrs to home.    Code Status: Prior    Subjective:   Patient is extremely hard of hearing discussion with the daughter at bedside who is very frustrated as his patient has had persistent episodes of diarrhea for the last several months with no resolution of her symptoms has seen some improvement with  antibiotics but no long-term relief for the patient.  Patient will downplay her symptoms and she is having explosive loss of bowel episodes ruining close daughter is concerned as her her cancer is coming back and not understanding why none of the medications to be given to her are helping and she is extremely frustrated.    Objective:     Vitals:   Temp (24hrs), Av.4 °F (36.3 °C), Min:96 °F (35.6 °C), Max:98 °F (36.7 °C)    Temp:  [96 °F (35.6 °C)-98 °F (36.7 °C)] 97.8 °F (36.6 °C)  HR:  [58-83] 72  Resp:  [18-20] 18  BP: (142-178)/(67-75) 173/71  SpO2:  [98 %-99 %] 98 %  Body mass index is 26.23 kg/m².     Input and Output Summary (last 24 hours):   No intake or output data in the 24 hours ending 24 1406    Physical Exam:   Physical Exam  Constitutional:       Appearance: She is well-developed.   HENT:      Head: Normocephalic and atraumatic.      Ears:      Comments: Hard of hearing  Eyes:      Conjunctiva/sclera: Conjunctivae normal.   Cardiovascular:      Rate and Rhythm: Normal rate and regular rhythm.      Heart sounds: Normal heart sounds.   Pulmonary:      Effort: Pulmonary effort is normal.      Breath sounds: Normal breath sounds.   Abdominal:      General: Bowel sounds are normal.      Palpations: Abdomen is soft.   Musculoskeletal:         General: Normal range of motion.      Cervical back: Normal range of motion.   Skin:     General: Skin is warm and dry.   Neurological:      Mental Status: She is alert and oriented to person, place, and time.   Psychiatric:         Behavior: Behavior normal.          Additional Data:     Labs:  Results from last 7 days   Lab Units 24  0935 24  1531   WBC Thousand/uL 6.99 9.43   HEMOGLOBIN g/dL 13.3 14.8   HEMATOCRIT % 40.0 45.7   PLATELETS Thousands/uL 224 305   SEGS PCT %  --  74   LYMPHO PCT %  --  15   MONO PCT %  --  8   EOS PCT %  --  1     Results from last 7 days   Lab Units 24  0935 24  1531   SODIUM mmol/L 140 139   POTASSIUM  mmol/L 3.2* 2.7*   CHLORIDE mmol/L 106 100   CO2 mmol/L 27 27   BUN mg/dL 12 15   CREATININE mg/dL 0.70 0.83   ANION GAP mmol/L 7 12   CALCIUM mg/dL 7.6* 8.1*   ALBUMIN g/dL  --  3.8   TOTAL BILIRUBIN mg/dL  --  0.95   ALK PHOS U/L  --  82   ALT U/L  --  7   AST U/L  --  12*   GLUCOSE RANDOM mg/dL 110 159*         Results from last 7 days   Lab Units 09/02/24  1115 09/02/24  0811 09/01/24  2134   POC GLUCOSE mg/dl 184* 110 111         Results from last 7 days   Lab Units 09/01/24  1812 09/01/24  1531   LACTIC ACID mmol/L 1.3 2.9*       Lines/Drains:  Invasive Devices       Peripheral Intravenous Line  Duration             Peripheral IV 09/01/24 Right;Ventral (anterior) Forearm <1 day                          Imaging: Reviewed radiology reports from this admission including: abdominal/pelvic CT    Recent Cultures (last 7 days):   Results from last 7 days   Lab Units 09/01/24  1830   C DIFF TOXIN B BY PCR  Negative       Last 24 Hours Medication List:   Current Facility-Administered Medications   Medication Dose Route Frequency Provider Last Rate    ALPRAZolam  0.25 mg Oral BID PRN Chris Vela MD      aspirin  81 mg Oral Daily Andre Machado DO      atorvastatin  20 mg Oral HS Andre Machado DO      cholestyramine sugar free  4 g Oral BID Andre Machado DO      donepezil  10 mg Oral HS Andre Machado DO      heparin (porcine)  5,000 Units Subcutaneous Q8H JIGNESH Gandara      hydrALAZINE  10 mg Intravenous Q6H PRN Andre Machado DO      insulin lispro  1-5 Units Subcutaneous TID AC Andre Machado DO      insulin lispro  1-5 Units Subcutaneous HS Andre Machado DO      lactated ringers  75 mL/hr Intravenous Continuous Andre Machado DO 75 mL/hr (09/02/24 0025)    losartan  25 mg Oral Daily Andre Machado, DO      melatonin  3 mg Oral HS PRN Chris Vela MD      metoprolol succinate  25 mg Oral Daily Andre Machado DO           Today, Patient Was Seen By: JIGNESH Hodge    **Please Note: This note may have been constructed using a voice recognition system.**

## 2024-09-02 NOTE — ASSESSMENT & PLAN NOTE
Lengthy discussion with daughter at bedside  Whom is very frustrated as patient will downplay her symptoms she has been having explosive diarrhea for several months with no improvement and patient is getting weaker and losing weight.  Stool studies pending  When persistent symptoms will consult GI to see if there is further recommendations  Daughter is concerned that cancer is returning CT results reviewed with the daughter however we will also add a  and tumor marker.

## 2024-09-02 NOTE — ASSESSMENT & PLAN NOTE
Lab Results   Component Value Date    HGBA1C 6.6 (H) 06/16/2024       Recent Labs     09/01/24  2134 09/02/24  0811   POCGLU 111 110         Blood Sugar Average: Last 72 hrs:  (P) 110.5  Most recent A1c 6.6 indicating adequate outpatient control/compliance   hold oral medications  Continue sliding scale insulin coverage and blood glucose monitoring

## 2024-09-02 NOTE — ASSESSMENT & PLAN NOTE
Mildly elevated systolic blood pressure upon presentation  Unknown absorption of oral antihypertensives given watery diarrhea as described by patient  Resume home medication regimen including Cozaar, Toprol  Proceed with as needed intravenous and hypertensives-hydralazine 10 mg p.o. every 6 hours as needed systolic blood pressure greater than 160

## 2024-09-02 NOTE — ASSESSMENT & PLAN NOTE
Patient presented to ED (9/1) accompanied by her daughter with reports of generalized weakness associated with multiple episodes of watery diarrhea over the past several days. Patient with similar presentation back in June 2024-workup unrevealing In ED, patient without SIRS criteria-however, with elevated lactic acid and evidence of volume depletion on physical exam      Plan:  Consider GI consultation if progressive/persistent diarrhea  Await stool studies-C. Difficile pending, Enteric stool study pending collection  Continue IVF  Trend work up

## 2024-09-02 NOTE — PLAN OF CARE
Problem: Prexisting or High Potential for Compromised Skin Integrity  Goal: Skin integrity is maintained or improved  Description: INTERVENTIONS:  - Identify patients at risk for skin breakdown  - Assess and monitor skin integrity  - Assess and monitor nutrition and hydration status  - Monitor labs   - Assess for incontinence   - Turn and reposition patient  - Assist with mobility/ambulation  - Relieve pressure over bony prominences  - Avoid friction and shearing  - Provide appropriate hygiene as needed including keeping skin clean and dry  - Evaluate need for skin moisturizer/barrier cream  - Collaborate with interdisciplinary team   - Patient/family teaching  - Consider wound care consult   Outcome: Progressing     Problem: PAIN - ADULT  Goal: Verbalizes/displays adequate comfort level or baseline comfort level  Description: Interventions:  - Encourage patient to monitor pain and request assistance  - Assess pain using appropriate pain scale  - Administer analgesics based on type and severity of pain and evaluate response  - Implement non-pharmacological measures as appropriate and evaluate response  - Consider cultural and social influences on pain and pain management  - Notify physician/advanced practitioner if interventions unsuccessful or patient reports new pain  Outcome: Progressing     Problem: INFECTION - ADULT  Goal: Absence or prevention of progression during hospitalization  Description: INTERVENTIONS:  - Assess and monitor for signs and symptoms of infection  - Monitor lab/diagnostic results  - Monitor all insertion sites, i.e. indwelling lines, tubes, and drains  - Monitor endotracheal if appropriate and nasal secretions for changes in amount and color  - [place] appropriate cooling/warming therapies per order  - Administer medications as ordered  - Instruct and encourage patient and family to use good hand hygiene technique  - Identify and instruct in appropriate isolation precautions for  identified infection/condition  Outcome: Progressing  Goal: Absence of fever/infection during neutropenic period  Description: INTERVENTIONS:  - Monitor WBC    Outcome: Progressing     Problem: SAFETY ADULT  Goal: Patient will remain free of falls  Description: INTERVENTIONS:  - Educate patient/family on patient safety including physical limitations  - Instruct patient to call for assistance with activity   - Consult OT/PT to assist with strengthening/mobility   - Keep Call bell within reach  - Keep bed low and locked with side rails adjusted as appropriate  - Keep care items and personal belongings within reach  - Initiate and maintain comfort rounds  - Make Fall Risk Sign visible to staff  - Apply yellow socks and bracelet for high fall risk patients  - Consider moving patient to room near nurses station  Outcome: Progressing  Goal: Maintain or return to baseline ADL function  Description: INTERVENTIONS:  -  Assess patient's ability to carry out ADLs; assess patient's baseline for ADL function and identify physical deficits which impact ability to perform ADLs (bathing, care of mouth/teeth, toileting, grooming, dressing, etc.)  - Assess/evaluate cause of self-care deficits   - Assess range of motion  - Assess patient's mobility; develop plan if impaired  - Assess patient's need for assistive devices and provide as appropriate  - Encourage maximum independence but intervene and supervise when necessary  - Involve family in performance of ADLs  - Assess for home care needs following discharge   - Consider OT consult to assist with ADL evaluation and planning for discharge  - Provide patient education as appropriate  Outcome: Progressing  Goal: Maintains/Returns to pre admission functional level  Description: INTERVENTIONS:  - Perform AM-PAC 6 Click Basic Mobility/ Daily Activity assessment daily.  - Set and communicate daily mobility goal to care team and patient/family/caregiver.   - Collaborate with rehabilitation  services on mobility goals if consulted  - Out of bed for toileting  - Record patient progress and toleration of activity level   Outcome: Progressing     Problem: DISCHARGE PLANNING  Goal: Discharge to home or other facility with appropriate resources  Description: INTERVENTIONS:  - Identify barriers to discharge w/patient and caregiver  - Arrange for needed discharge resources and transportation as appropriate  - Identify discharge learning needs (meds, wound care, etc.)  - Arrange for interpretive services to assist at discharge as needed  - Refer to Case Management Department for coordinating discharge planning if the patient needs post-hospital services based on physician/advanced practitioner order or complex needs related to functional status, cognitive ability, or social support system  Outcome: Progressing     Problem: Knowledge Deficit  Goal: Patient/family/caregiver demonstrates understanding of disease process, treatment plan, medications, and discharge instructions  Description: Complete learning assessment and assess knowledge base.  Interventions:  - Provide teaching at level of understanding  - Provide teaching via preferred learning methods  Outcome: Progressing

## 2024-09-02 NOTE — ASSESSMENT & PLAN NOTE
Mildly elevated systolic blood pressure upon presentation  Unknown absorption of oral antihypertensives given watery diarrhea as described by patient  Continue home regimen including Cozaar, Toprol with parameters  Continue with prn hydralazine 10 mg p.o. every 6 hours as needed systolic blood pressure greater than 160

## 2024-09-02 NOTE — ASSESSMENT & PLAN NOTE
Low index of suspicion for bowel ischemia  Likely, the result of volume depletion  Anticipate improvement with hydration

## 2024-09-02 NOTE — H&P
"[facility]  H&P  Name: Violet Dick 84 y.o. female I MRN: [id number]  Unit/Bed#: -01 I Date of Admission: 9/1/2024   Date of Service: 9/2/2024 I Hospital Day: 0      Assessment & Plan   * Dehydration determined by examination  Assessment & Plan  Patient presents today (9/1) accompanied by her daughter with reports of generalized weakness associated with multiple episodes of watery diarrhea over the past several days  Patient with similar presentation back in June 2024-workup unrevealing  Patient reports stool \"coming out like water\" over the past several days resulting in overall fatigue  Denies nausea or vomiting  Oral intake per patient's daughter has been suboptimal    In ED, patient without SIRS criteria-however, with elevated lactic acid and evidence of volume depletion on physical exam  No stooling occurred in ED-stool studies ordered-pending  Patient hemodynamically stable    Plan:  Observe under hospitalist service  Consider GI consultation if progressive/persistent diarrhea  Await stool studies-C. difficile, Campylobacter, O&P, Shigella, Shiga toxin ordered  Proceed with maintenance fluids      Lactic acidosis  Assessment & Plan  Low index of suspicion for bowel ischemia  Likely, the result of volume depletion  Anticipate improvement with hydration      Diarrhea of presumed infectious origin  Assessment & Plan  Check stool studies-plan as above    Diabetes mellitus, type 2 (HCC)  Assessment & Plan  Lab Results   Component Value Date    HGBA1C 6.6 (H) 06/16/2024       Recent Labs     09/01/24  2134   POCGLU 111       Blood Sugar Average: Last 72 hrs:  (P) 111  Most recent A1c 6.6 indicating adequate outpatient control/compliance   hold oral medications  Proceed with sliding scale insulin coverage    HTN (hypertension)  Assessment & Plan  Mildly elevated systolic blood pressure upon presentation  Unknown absorption of oral antihypertensives given watery diarrhea as described " "by patient  Resume home medication regimen including Cozaar, Toprol  Proceed with as needed intravenous and hypertensives-hydralazine 10 mg p.o. every 6 hours as needed systolic blood pressure greater than 160           Chief Complaint:   Weakness and diarrhea    History of Present Illness:    Violet Dick is a 84 y.o. female who presents with weakness and multiple episodes of diarrhea for the past several days.    Patient with similar presentation back in June 2024-workup unrevealing  Patient reports stool \"coming out like water\" over the past several days resulting in overall fatigue  Denies nausea or vomiting  Oral intake per patient's daughter has been suboptimal     In ED, patient without SIRS criteria-however, with elevated lactic acid and evidence of volume depletion on physical exam  No stooling occurred in ED-stool studies ordered-pending  Patient hemodynamically stable    Request for observation    Review of Systems:    Review of Systems   Unable to perform ROS: Acuity of condition       Past Medical and Surgical History:     Past Medical History:   Diagnosis Date    Cancer (HCC)     cervical 10 years ago- chemo radiation    Diabetes mellitus (HCC)     Diverticulitis     [place] (hard of hearing)     Hypertension        Past Surgical History:   Procedure Laterality Date    CATARACT EXTRACTION      ELBOW SURGERY Right     HYSTERECTOMY      partial    TOE SURGERY      Daughter unsure side       Meds/Allergies:    Prior to Admission medications    Medication Sig Start Date End Date Taking? Authorizing Provider   aspirin 81 mg chewable tablet Chew 81 mg daily Takes after dinner   Yes Historical Provider, MD   atorvastatin (LIPITOR) 20 mg tablet Take 20 mg by mouth daily at bedtime   Yes Historical Provider, MD   donepezil (ARICEPT) 10 mg tablet take 1 tablet by mouth every day at night 8/2/24  Yes Historical Provider, MD   Empagliflozin (Jardiance) 25 MG TABS Take 25 mg by mouth every evening   Yes Historical " "Provider, MD   losartan (COZAAR) 25 mg tablet Take 25 mg by mouth daily After dinner   Yes Historical Provider, MD   metoprolol succinate (TOPROL-XL) 25 mg 24 hr tablet Take 25 mg by mouth daily Takes after dinner 5/9/24  Yes Historical Provider, MD   cholestyramine (QUESTRAN) 4 g packet PLEASE SEE ATTACHED FOR DETAILED DIRECTIONS  Patient not taking: Reported on 9/1/2024 7/24/24   Arabella Hancock PA-C   dicyclomine (BENTYL) 20 mg tablet Take 1 tablet (20 mg total) by mouth 2 (two) times a day  Patient not taking: Reported on 9/1/2024 7/2/24   Edda Christian DO   Empagliflozin 25 MG TABS Take 25 mg by mouth daily  Patient not taking: Reported on 6/16/2024    Historical Provider, MD   metoprolol tartrate (LOPRESSOR) 25 mg tablet Take 25 mg by mouth daily  Patient not taking: Reported on 6/21/2024    Historical Provider, MD   PSYLLIUM HUSK PO Take by mouth  Patient not taking: Reported on 9/1/2024    Historical Provider, MD     I have reviewed home medications with a medical source (PCP, Pharmacy, other).    Allergies: No Known Allergies    Social History:     Marital Status:    Substance Use History:   Social History     Substance and Sexual Activity   Alcohol Use Never     Social History     Tobacco Use   Smoking Status Never   Smokeless Tobacco Never     Social History     Substance and Sexual Activity   Drug Use Never       Family History:    non-contributory    Physical Exam:     Vitals:   Blood Pressure: (!) 173/71 (09/02/24 0744)  Pulse: 72 (09/02/24 0744)  Temperature: 97.8 °F (36.6 °C) (09/02/24 0744)  Temp Source: Oral (09/01/24 1915)  Respirations: 18 (09/01/24 1914)  Height: 4' 11\" (149.9 cm) (09/01/24 1914)  Weight - Scale: 58.9 kg (129 lb 13.6 oz) (09/01/24 1914)  SpO2: 98 % (09/02/24 0744)    Physical Exam  Constitutional:       Appearance: Normal appearance.   HENT:      Head: Normocephalic and atraumatic.      Right Ear: External ear normal.      Left Ear: External ear normal.      Ears:     "     Nose: Nose normal.   Eyes:      General: No scleral icterus.     Conjunctiva/sclera: Conjunctivae normal.   Cardiovascular:      Pulses: Normal pulses.   Pulmonary:      Effort: Pulmonary effort is normal.   Musculoskeletal:         General: Normal range of motion.      Cervical back: Normal range of motion.   Neurological:      General: No focal deficit present.      Mental Status: She is alert and oriented to person, place, and time.   Psychiatric:         Mood and Affect: Mood normal.         Behavior: Behavior normal.         Thought Content: Thought content normal.         Judgment: Judgment normal.       Additional Data:     Lab Results: I have personally reviewed pertinent reports.      Results from last 7 days   Lab Units 09/01/24  1531   WBC Thousand/uL 9.43   HEMOGLOBIN g/dL 14.8   HEMATOCRIT % 45.7   PLATELETS Thousands/uL 305   SEGS PCT % 74   LYMPHO PCT % 15   MONO PCT % 8   EOS PCT % 1     Results from last 7 days   Lab Units 09/01/24  1531   POTASSIUM mmol/L 2.7*   CHLORIDE mmol/L 100   CO2 mmol/L 27   BUN mg/dL 15   CREATININE mg/dL 0.83   CALCIUM mg/dL 8.1*   ALK PHOS U/L 82   ALT U/L 7   AST U/L 12*           Imaging: I have personally reviewed pertinent reports.      CT abdomen pelvis wo contrast    Result Date: 9/1/2024  Narrative: CT ABDOMEN AND PELVIS WITHOUT IV CONTRAST INDICATION: diarrhea, h/o diverticulitis. COMPARISON: None. TECHNIQUE: CT examination of the abdomen and pelvis was performed without intravenous contrast. Multiplanar 2D reformatted images were created from the source data. This examination, like all CT scans performed in the [facility], was performed utilizing techniques to minimize radiation dose exposure, including the use of iterative reconstruction and automated exposure control. Radiation dose length product (DLP) for this visit: 635 mGy-cm Enteric Contrast: Not administered. FINDINGS: ABDOMEN LOWER CHEST: No clinically significant abnormality in the  visualized lower chest. LIVER/BILIARY TREE: Unremarkable. GALLBLADDER: Cholelithiasis without findings of acute cholecystitis. SPLEEN: Unremarkable. PANCREAS: Unremarkable. ADRENAL GLANDS: Unremarkable. KIDNEYS/URETERS: Unremarkable. No hydronephrosis. STOMACH AND BOWEL: Colonic diverticulosis without findings of acute diverticulitis.. Mild pericolonic inflammatory change with liquid stool at the rectosigmoid colon. APPENDIX: No findings to suggest appendicitis. ABDOMINOPELVIC CAVITY: No ascites. No pneumoperitoneum. No lymphadenopathy. VESSELS: Atherosclerosis without abdominal aortic aneurysm. PELVIS REPRODUCTIVE ORGANS: Post hysterectomy. URINARY BLADDER: Unremarkable. ABDOMINAL WALL/INGUINAL REGIONS: Unremarkable. BONES: No acute fracture or suspicious osseous lesion.     Impression: Findings consistent with proctocolitis Colonic diverticulosis Workstation performed: [id number]     CT abdomen pelvis with contrast    Result Date: 8/9/2024  Narrative: CT ABDOMEN AND PELVIS WITH IV CONTRAST INDICATION: Right flank pain. COMPARISON: 7/15/2024 TECHNIQUE: CT examination of the abdomen and pelvis was performed. Multiplanar 2D reformatted images were created from the source data. This examination, like all CT scans performed in the [facility], was performed utilizing techniques to minimize radiation dose exposure, including the use of iterative reconstruction and automated exposure control. Radiation dose length product (DLP) for this visit: 879 mGy-cm IV Contrast: 100 mL of iohexol (OMNIPAQUE) Enteric Contrast: Not administered. FINDINGS: ABDOMEN LOWER CHEST: Clear lung bases. LIVER/BILIARY TREE: Unremarkable. GALLBLADDER: Punctate gallstones Portion of the gallbladder.. SPLEEN: Unremarkable. PANCREAS: Unremarkable. ADRENAL GLANDS: Unremarkable. KIDNEYS/URETERS: Symmetric nephrographic phase enhancement of the kidneys. No obstructive uropathy. Diverticulosis without evidence of diverticulitis or  colitis. APPENDIX: No findings to suggest appendicitis. ABDOMINOPELVIC CAVITY: No ascites. No pneumoperitoneum. No lymphadenopathy. VESSELS: Calcified plaque throughout the abdominal aorta and iliac arteries PELVIS REPRODUCTIVE ORGANS: No pelvic mass or fluid. Prior hysterectomy. URINARY BLADDER: Bladder wall enhancement. ABDOMINAL WALL/INGUINAL REGIONS: Unremarkable. BONES: No lytic or blastic lesion. L4 anterolisthesis L4-5 millimeters likely secondary to severe disc and facet degenerative change, stable.     Impression: 1. Findings suggestive of cystitis. 2. No evidence of pyelonephritis or obstructive uropathy. Workstation performed: [id number]     Colonoscopy    Result Date: 8/5/2024  Narrative: Table formatting from the original result was not included.  Replaced by [facility] [address] [facility] [id number] [phone] DATE OF SERVICE: 8/05/24 PHYSICIAN(S): Attending: Marco Antonio Anderson MD Fellow: No Staff Documented INDICATION: Acute diverticulitis, Diarrhea, unspecified type POST-OP DIAGNOSIS: See the impression below. HISTORY: Prior colonoscopy: 7 years ago. BOWEL PREPARATION: Miralax/Dulcolax PREPROCEDURE: Informed consent was obtained for the procedure, including sedation. Risks including but not limited to bleeding, infection, perforation, adverse drug reaction and aspiration were explained in detail. Also explained about less than 100% sensitivity with the exam and other alternatives. The patient was placed in the left lateral decubitus position. Procedure: Colonoscopy DETAILS OF PROCEDURE: Patient was taken to the procedure room where a time out was performed to confirm correct patient and correct procedure. The patient underwent monitored anesthesia care, which was administered by an anesthesia professional. The patient's blood pressure, ECG, ETCO2, heart rate, level of consciousness, oxygen and respirations were monitored throughout the procedure. A digital rectal exam was  performed. The scope was introduced through the anus and advanced to the cecum. Retroflexion was performed in the rectum. The quality of bowel preparation was evaluated using the [place] Bowel Preparation Scale with scores of: right colon = 2, transverse colon = 2, left colon = 2. The total BBPS score was 6. Bowel prep was adequate. The patient's estimated blood loss was minimal (<5 mL). The procedure was difficult due to Sigmoid fibrosis and stricturing. In response to procedure difficulty, the instrument was changed to a super slim colonoscope. The patient tolerated the procedure well. There were no apparent adverse events. ANESTHESIA INFORMATION: ASA: III Anesthesia Type: IV Sedation with Anesthesia MEDICATIONS: No administrations occurring from 0715 to 0737 on 08/05/24 FINDINGS: Multiple medium diverticula of moderate severity in the sigmoid colon Benign-appearing stricture (traversable) in the sigmoid colon; performed cold forceps biopsy. Segment of sigmoid colon in the area of diverticulosis involved in a fibrotic narrowing from 20 to 25 cm. EVENTS: Procedure Events Event Event Time ENDO CECUM REACHED 8/5/2024  7:32 AM ENDO SCOPE OUT TIME 8/5/2024  7:36 AM SPECIMENS: ID Type Source Tests Collected by Time Destination 1 : sigmoid Tissue Colon TISSUE EXAM Marco Antonio Anderson MD 8/5/2024  7:36 AM  EQUIPMENT: Colonoscope -[id number] Colonoscope -[id number]     Impression: Diverticulosis of moderate severity in the sigmoid colon Stricture in the sigmoid colon; performed cold forceps biopsy RECOMMENDATION: Await pathology results Follow-up biopsy results in 3 weeks Stool softeners  Marco Antonio Anderson MD       EKG, Pathology, and Other Studies Reviewed on Admission:   EKG:     Epic / Care Everywhere Records Reviewed: Yes     ** Please Note: This note has been constructed using a voice recognition system. **

## 2024-09-02 NOTE — ASSESSMENT & PLAN NOTE
Lab Results   Component Value Date    HGBA1C 6.6 (H) 06/16/2024       Recent Labs     09/01/24 2134   POCGLU 111       Blood Sugar Average: Last 72 hrs:  (P) 111  Most recent A1c 6.6 indicating adequate outpatient control/compliance   hold oral medications  Proceed with sliding scale insulin coverage

## 2024-09-03 VITALS
WEIGHT: 129.85 LBS | BODY MASS INDEX: 26.18 KG/M2 | OXYGEN SATURATION: 96 % | TEMPERATURE: 98 F | RESPIRATION RATE: 18 BRPM | SYSTOLIC BLOOD PRESSURE: 156 MMHG | DIASTOLIC BLOOD PRESSURE: 62 MMHG | HEART RATE: 67 BPM | HEIGHT: 59 IN

## 2024-09-03 PROBLEM — K56.699 STRICTURE OF SIGMOID COLON (HCC): Status: ACTIVE | Noted: 2024-09-03

## 2024-09-03 PROBLEM — E87.20 LACTIC ACIDOSIS: Status: RESOLVED | Noted: 2024-09-01 | Resolved: 2024-09-03

## 2024-09-03 LAB
ALBUMIN SERPL BCG-MCNC: 3.4 G/DL (ref 3.5–5)
ALP SERPL-CCNC: 80 U/L (ref 34–104)
ALT SERPL W P-5'-P-CCNC: 7 U/L (ref 7–52)
ANION GAP SERPL CALCULATED.3IONS-SCNC: 8 MMOL/L (ref 4–13)
AST SERPL W P-5'-P-CCNC: 10 U/L (ref 13–39)
BILIRUB SERPL-MCNC: 0.85 MG/DL (ref 0.2–1)
BUN SERPL-MCNC: 10 MG/DL (ref 5–25)
CALCIUM ALBUM COR SERPL-MCNC: 8 MG/DL (ref 8.3–10.1)
CALCIUM SERPL-MCNC: 7.5 MG/DL (ref 8.4–10.2)
CHLORIDE SERPL-SCNC: 103 MMOL/L (ref 96–108)
CO2 SERPL-SCNC: 27 MMOL/L (ref 21–32)
CREAT SERPL-MCNC: 0.71 MG/DL (ref 0.6–1.3)
GFR SERPL CREATININE-BSD FRML MDRD: 78 ML/MIN/1.73SQ M
GLUCOSE SERPL-MCNC: 117 MG/DL (ref 65–140)
GLUCOSE SERPL-MCNC: 121 MG/DL (ref 65–140)
MAGNESIUM SERPL-MCNC: 1 MG/DL (ref 1.9–2.7)
POTASSIUM SERPL-SCNC: 3.2 MMOL/L (ref 3.5–5.3)
PROT SERPL-MCNC: 6 G/DL (ref 6.4–8.4)
SODIUM SERPL-SCNC: 138 MMOL/L (ref 135–147)

## 2024-09-03 PROCEDURE — 82948 REAGENT STRIP/BLOOD GLUCOSE: CPT

## 2024-09-03 PROCEDURE — 80053 COMPREHEN METABOLIC PANEL: CPT | Performed by: PHYSICIAN ASSISTANT

## 2024-09-03 PROCEDURE — 83735 ASSAY OF MAGNESIUM: CPT | Performed by: PHYSICIAN ASSISTANT

## 2024-09-03 PROCEDURE — 99239 HOSP IP/OBS DSCHRG MGMT >30: CPT | Performed by: PHYSICIAN ASSISTANT

## 2024-09-03 RX ORDER — LANOLIN ALCOHOL/MO/W.PET/CERES
800 CREAM (GRAM) TOPICAL 2 TIMES DAILY
Qty: 60 TABLET | Refills: 1 | Status: SHIPPED | OUTPATIENT
Start: 2024-09-03

## 2024-09-03 RX ORDER — LANOLIN ALCOHOL/MO/W.PET/CERES
800 CREAM (GRAM) TOPICAL 2 TIMES DAILY
Status: DISCONTINUED | OUTPATIENT
Start: 2024-09-03 | End: 2024-09-03 | Stop reason: HOSPADM

## 2024-09-03 RX ORDER — POTASSIUM CHLORIDE 750 MG/1
20 CAPSULE, EXTENDED RELEASE ORAL 2 TIMES DAILY
Qty: 60 CAPSULE | Refills: 2 | Status: SHIPPED | OUTPATIENT
Start: 2024-09-03

## 2024-09-03 RX ADMIN — SODIUM CHLORIDE, SODIUM LACTATE, POTASSIUM CHLORIDE, AND CALCIUM CHLORIDE 75 ML/HR: .6; .31; .03; .02 INJECTION, SOLUTION INTRAVENOUS at 02:01

## 2024-09-03 RX ADMIN — Medication 800 MG: at 11:36

## 2024-09-03 RX ADMIN — LOSARTAN POTASSIUM 25 MG: 25 TABLET, FILM COATED ORAL at 08:49

## 2024-09-03 RX ADMIN — HEPARIN SODIUM 5000 UNITS: 5000 INJECTION INTRAVENOUS; SUBCUTANEOUS at 05:01

## 2024-09-03 RX ADMIN — ASPIRIN 81 MG: 81 TABLET, CHEWABLE ORAL at 08:48

## 2024-09-03 RX ADMIN — CHOLESTYRAMINE 4 G: 4 POWDER, FOR SUSPENSION ORAL at 08:48

## 2024-09-03 RX ADMIN — METOPROLOL SUCCINATE 25 MG: 25 TABLET, EXTENDED RELEASE ORAL at 08:48

## 2024-09-03 NOTE — PLAN OF CARE
Problem: INFECTION - ADULT  Goal: Absence or prevention of progression during hospitalization  Description: INTERVENTIONS:  - Assess and monitor for signs and symptoms of infection  - Monitor lab/diagnostic results  - Monitor all insertion sites, i.e. indwelling lines, tubes, and drains  - Monitor endotracheal if appropriate and nasal secretions for changes in amount and color  - [place] appropriate cooling/warming therapies per order  - Administer medications as ordered  - Instruct and encourage patient and family to use good hand hygiene technique  - Identify and instruct in appropriate isolation precautions for identified infection/condition  Outcome: Progressing     Problem: SAFETY ADULT  Goal: Maintain or return to baseline ADL function  Description: INTERVENTIONS:  -  Assess patient's ability to carry out ADLs; assess patient's baseline for ADL function and identify physical deficits which impact ability to perform ADLs (bathing, care of mouth/teeth, toileting, grooming, dressing, etc.)  - Assess/evaluate cause of self-care deficits   - Assess range of motion  - Assess patient's mobility; develop plan if impaired  - Assess patient's need for assistive devices and provide as appropriate  - Encourage maximum independence but intervene and supervise when necessary  - Involve family in performance of ADLs  - Assess for home care needs following discharge   - Consider OT consult to assist with ADL evaluation and planning for discharge  - Provide patient education as appropriate  Outcome: Progressing     Problem: DISCHARGE PLANNING  Goal: Discharge to home or other facility with appropriate resources  Description: INTERVENTIONS:  - Identify barriers to discharge w/patient and caregiver  - Arrange for needed discharge resources and transportation as appropriate  - Identify discharge learning needs (meds, wound care, etc.)  - Arrange for interpretive services to assist at discharge as needed  - Refer to Case  Management Department for coordinating discharge planning if the patient needs post-hospital services based on physician/advanced practitioner order or complex needs related to functional status, cognitive ability, or social support system  Outcome: Progressing     Problem: Knowledge Deficit  Goal: Patient/family/caregiver demonstrates understanding of disease process, treatment plan, medications, and discharge instructions  Description: Complete learning assessment and assess knowledge base.  Interventions:  - Provide teaching at level of understanding  - Provide teaching via preferred learning methods  Outcome: Progressing     Problem: PAIN - ADULT  Goal: Verbalizes/displays adequate comfort level or baseline comfort level  Description: Interventions:  - Encourage patient to monitor pain and request assistance  - Assess pain using appropriate pain scale  - Administer analgesics based on type and severity of pain and evaluate response  - Implement non-pharmacological measures as appropriate and evaluate response  - Consider cultural and social influences on pain and pain management  - Notify physician/advanced practitioner if interventions unsuccessful or patient reports new pain  Outcome: Progressing     Problem: DISCHARGE PLANNING  Goal: Discharge to home or other facility with appropriate resources  Description: INTERVENTIONS:  - Identify barriers to discharge w/patient and caregiver  - Arrange for needed discharge resources and transportation as appropriate  - Identify discharge learning needs (meds, wound care, etc.)  - Arrange for interpretive services to assist at discharge as needed  - Refer to Case Management Department for coordinating discharge planning if the patient needs post-hospital services based on physician/advanced practitioner order or complex needs related to functional status, cognitive ability, or social support system  Outcome: Progressing

## 2024-09-03 NOTE — QUICK NOTE
Informed by nursing staff that the patient continues to remove her IVs.  She removed 1 yesterday and now twice overnight.  Will hold off on any further IVs at this time.

## 2024-09-03 NOTE — ASSESSMENT & PLAN NOTE
Seen on colonoscopy June '24:  Diverticulosis of moderate severity in the sigmoid colon  Stricture in the sigmoid colon  As above, recommending outpatient CRS referral  Continue adequate fiber, hydration, stool softeners

## 2024-09-03 NOTE — DISCHARGE INSTR - AVS FIRST PAGE
Monitor for signs of OBSTRUCTION:  Nausea and vomiting  Lack of bowel movement or loss of passing gas  Confusion, fever  Distension of the abdomen    Continue with daily stool softener, not laxatives, fiber supplement with adequate hydration.  Follow up with GI and colorectal  Follow up PCP

## 2024-09-03 NOTE — CASE MANAGEMENT
Case Management Assessment & Discharge Planning Note    Patient name Violet Dick  Location /-01 MRN [id number]  : 1940 Date 9/3/2024       Current Admission Date: 2024  Current Admission Diagnosis:Dehydration determined by examination   Patient Active Problem List    Diagnosis Date Noted Date Diagnosed    Stricture of sigmoid colon (HCC) 2024     Dehydration determined by examination 2024     Diarrhea of presumed infectious origin 2024     History of hysterectomy 2024     Hard of hearing 2024     HTN (hypertension) 2024     Diabetes mellitus, type 2 (HCC) 2024     Acute diverticulitis 2024       LOS (days): 1  Geometric Mean LOS (GMLOS) (days):   Days to GMLOS:     OBJECTIVE:    Risk of Unplanned Readmission Score: 20.1         Current admission status: Inpatient       Preferred Pharmacy:   [facility]/pharm[facility] #[id number] - [place] - [address]  [address]  [facility] [address]  Phone: [phone] Fax: [phone]    Primary Care Provider: Bela Kate    Primary Insurance: MEDICARE  Secondary Insurance: [place] [employer] [facility]    ASSESSMENT:  Active Health Care Proxies       Marsha Short Health Care Representative - Daughter   Primary Phone: [phone] (Mobile)                 Advance Directives  Does patient have a Health Care POA?: No  Does patient currently have a Health Care decision maker?: Yes, please see Health Care Proxy section  Does patient have Advance Directives?: No  Primary Contact: Marsha Short (Daughter) [phone] (M)         Readmission Root Cause  30 Day Readmission: No    Patient Information  Admitted from:: Home  Mental Status: Other (Comment) (pt was sleeping)  During Assessment patient was accompanied by: Other-Comment (son in law)  Assessment information provided by:: Daughter (son in law, son in law called pts daughter during conversation.)  Support Systems:  Daughter, Family members  County of Residence: [place]  What [facility] do you live in?: [place]  Home entry access options. Select all that apply.: Stairs  Number of steps to enter home.: 4  Type of Current Residence: 2 story home  Upon entering residence, is there a bedroom on the main floor (no further steps)?: Yes  Upon entering residence, is there a bathroom on the main floor (no further steps)?: Yes  Living Arrangements: Lives w/ Daughter, Other (Comment) (son in law)  Is patient a ?: No    Activities of Daily Living Prior to Admission  Functional Status: Assistance  Completes ADLs independently?: No  Level of ADL dependence: Assistance  Ambulates independently?: No  Level of ambulatory dependence: Assistance  Does patient use assisted devices?: Yes  Assisted Devices (DME) used: Walker, Bedside Commode, Shower Chair, Straight Cane  Does patient currently own DME?: Yes  What DME does the patient currently own?: Walker, Straight Cane, Shower Chair, Bedside Commode  Does patient have a history of Outpatient Therapy (PT/OT)?: No  Does the patient have a history of Short-Term Rehab?: No  Does patient have a history of HHC?: No  Does patient currently have HHC?: No         Patient Information Continued  Income Source: SSI/SSD  Does patient have prescription coverage?: Yes  Does patient receive dialysis treatments?: No  Does patient have a history of substance abuse?: No  Does patient have a history of Mental Health Diagnosis?: No    PHQ 2/9 Screening   Reviewed PHQ 2/9 Depression Screening Score?: No    Means of Transportation  Means of Transport to Appts:: Family transport      Social Determinants of Health (SDOH)      Flowsheet Row Most Recent Value   Housing Stability    In the last 12 months, was there a time when you were not able to pay the mortgage or rent on time? N   In the past 12 months, how many times have you moved where you were living? 0   At any time in the past 12 months, were you homeless or  living in a shelter (including now)? N   Transportation Needs    In the past 12 months, has lack of transportation kept you from medical appointments or from getting medications? no   In the past 12 months, has lack of transportation kept you from meetings, work, or from getting things needed for daily living? No   Food Insecurity    Within the past 12 months, you worried that your food would run out before you got the money to buy more. Never true   Within the past 12 months, the food you bought just didn't last and you didn't have money to get more. Never true   Utilities    In the past 12 months has the electric, gas, oil, or water company threatened to shut off services in your home? No            DISCHARGE DETAILS:    Discharge planning discussed with:: Patients son in law and daughter via phone for a short period of time.  Freedom of Choice: Yes  Comments - Freedom of Choice: CM discussed freedom of choice as it pertains to discharge planning. Daughter declined hhc and rehab for patient. Son in law declined any needs.  CM contacted family/caregiver?: Yes  Were Treatment Team discharge recommendations reviewed with patient/caregiver?: Yes  Did patient/caregiver verbalize understanding of patient care needs?: Yes  Were patient/caregiver advised of the risks associated with not following Treatment Team discharge recommendations?: Yes    Contacts  Patient Contacts: Marsha (Daughter)  [phone] (SOn in law oscar in person)  Relationship to Patient:: Family  Contact Method: Phone    Requested Home Health Care         Is the patient interested in HHC at discharge?: No    DME Referral Provided  Referral made for DME?: No    Other Referral/Resources/Interventions Provided:  Interventions: None Indicated    Would you like to participate in our [facility] service program?  : No - Declined    Treatment Team Recommendation: Home  Discharge Destination Plan:: Home  Transport at Discharge : Family

## 2024-09-03 NOTE — ASSESSMENT & PLAN NOTE
Patient presented to ED (9/1) accompanied by her daughter with reports of generalized weakness associated with multiple episodes of watery diarrhea over the past several days. Patient with similar presentation back in June 2024-workup unrevealing then.     C diff negative; Enteric stool study pending   Discussed case with GI - she had colonoscopy in June that showed sigmoid colon stricture, recommending outpatient CRS evaluation as this would be a surgical fix vs balloon dilation  Discussed this recommendation with JOCE, he is agreeable   Continue adequate fiber and water intake  Monitor for loss of stools/bowel function

## 2024-09-03 NOTE — PLAN OF CARE
Problem: PAIN - ADULT  Goal: Verbalizes/displays adequate comfort level or baseline comfort level  Description: Interventions:  - Encourage patient to monitor pain and request assistance  - Assess pain using appropriate pain scale  - Administer analgesics based on type and severity of pain and evaluate response  - Implement non-pharmacological measures as appropriate and evaluate response  - Consider cultural and social influences on pain and pain management  - Notify physician/advanced practitioner if interventions unsuccessful or patient reports new pain  Outcome: Progressing     Problem: INFECTION - ADULT  Goal: Absence or prevention of progression during hospitalization  Description: INTERVENTIONS:  - Assess and monitor for signs and symptoms of infection  - Monitor lab/diagnostic results  - Monitor all insertion sites, i.e. indwelling lines, tubes, and drains  - Monitor endotracheal if appropriate and nasal secretions for changes in amount and color  - Soldier appropriate cooling/warming therapies per order  - Administer medications as ordered  - Instruct and encourage patient and family to use good hand hygiene technique  - Identify and instruct in appropriate isolation precautions for identified infection/condition  Outcome: Progressing     Problem: SAFETY ADULT  Goal: Patient will remain free of falls  Description: INTERVENTIONS:  - Educate patient/family on patient safety including physical limitations  - Instruct patient to call for assistance with activity   - Consult OT/PT to assist with strengthening/mobility   - Keep Call bell within reach  - Keep bed low and locked with side rails adjusted as appropriate  - Keep care items and personal belongings within reach  - Initiate and maintain comfort rounds  - Make Fall Risk Sign visible to staff  - Offer Toileting every 2 Hours, in advance of need  - Initiate/Maintain bed/chair alarm  - Obtain necessary fall risk management equipment: call bell within  reach  - Apply yellow socks and bracelet for high fall risk patients  - Consider moving patient to room near nurses station  Outcome: Progressing     Problem: DISCHARGE PLANNING  Goal: Discharge to home or other facility with appropriate resources  Description: INTERVENTIONS:  - Identify barriers to discharge w/patient and caregiver  - Arrange for needed discharge resources and transportation as appropriate  - Identify discharge learning needs (meds, wound care, etc.)  - Arrange for interpretive services to assist at discharge as needed  - Refer to Case Management Department for coordinating discharge planning if the patient needs post-hospital services based on physician/advanced practitioner order or complex needs related to functional status, cognitive ability, or social support system  Outcome: Progressing     Problem: Knowledge Deficit  Goal: Patient/family/caregiver demonstrates understanding of disease process, treatment plan, medications, and discharge instructions  Description: Complete learning assessment and assess knowledge base.  Interventions:  - Provide teaching at level of understanding  - Provide teaching via preferred learning methods  Outcome: Progressing

## 2024-09-03 NOTE — DISCHARGE SUMMARY
[facility]  Discharge Summary  Name: Violet Dick I MRN: [id number]  Unit/Bed#: -01I Date of Admission: 9/1/2024   Date of Service: 9/1/2024  I Hospital Day: 1    * Dehydration determined by examination  Assessment & Plan  Patient presented to ED (9/1) accompanied by her daughter with reports of generalized weakness associated with multiple episodes of watery diarrhea over the past several days. Patient with similar presentation back in June 2024-workup unrevealing then.     C diff negative; Enteric stool study pending   Discussed case with GI - she had colonoscopy in June that showed sigmoid colon stricture, recommending outpatient CRS evaluation as this would be a surgical fix vs balloon dilation  Discussed this recommendation with JOCE, he is agreeable   Continue adequate fiber and water intake  Monitor for loss of stools/bowel function     Diarrhea of presumed infectious origin  Assessment & Plan  Lengthy discussion with daughter at bedside by (prior provider) who expresses frustrated as patient will downplay her symptoms; explosive symptoms for months, per daughter report   CA-125 normal; AFP normal     Lactic acidosis-resolved as of 9/3/2024  Assessment & Plan  Lactic acid normalized with IVF  Likely in setting of volume depletion       Diabetes mellitus, type 2 (HCC)  Assessment & Plan  Blood Sugar Average: Last 72 hrs:(P) 122  Most recent A1c 6.6 indicating adequate outpatient control/compliance  Resume Jardiance    Stricture of sigmoid colon (HCC)  Assessment & Plan  Seen on colonoscopy June '24:  Diverticulosis of moderate severity in the sigmoid colon  Stricture in the sigmoid colon  As above, recommending outpatient CRS referral  Continue adequate fiber, hydration, stool softeners     HTN (hypertension)  Assessment & Plan  Mildly elevated systolic blood pressure upon presentation  Continue home regimen including Cozaar, Toprol with parameters  Blood Pressure: 156/62          Medical Problems       Resolved Problems  Date Reviewed: 9/3/2024            Resolved    HLD (hyperlipidemia) 9/2/2024     Resolved by  Andre Machado DO    Lactic acidosis 9/3/2024     Resolved by  Jeanne Diallo PA-C        Discharging Physician / Practitioner: Jeanne Diallo PA-C  PCP: Bela Kate  Admission Date:   Admission Orders (From admission, onward)       Ordered        09/02/24 1407  INPATIENT ADMISSION  Once            09/01/24 1816  Place in Observation  Once                          Discharge Date: 09/03/24    Consultations During Hospital Stay:  None    Procedures Performed:   None     Significant Findings / Test Results:   CT abdomen pelvis wo contrast    Result Date: 9/1/2024  Findings consistent with proctocolitis Colonic diverticulosis Workstation performed: [id number]     Lab Results   Component Value Date    AFPTUMOR 2.06 09/02/2024     4.7 09/02/2024         Incidental Findings:       Test Results Pending at Discharge (will require follow up):        Outpatient Tests Requested:      Complications:      Reason for Admission: dehydration, diarrhea     Hospital Course:   Violet Dick is a 84 y.o. female patient with  has a past medical history of Cancer (HCC), Diabetes mellitus (HCC), Diverticulitis, [place] (hard of hearing), and Hypertension. who originally presented to the hospital on 9/1/2024 due to Diarrhea (Patient arrived to ER c/o diarrhea started 2 days ago, worsening the last 24 hours. Patient is became stool incontinent and stool is coming out like water. Patient has no appetite. Denies any blood in stool. ).     Presented by family due to weakness and diarrhea for several days, ongoing really since at least June.     Workup here unrevealing for acute infectious etiology; imaging negative for obstruction. From last colonoscopy, there is evidence of colon stricture, which is likely culprit for GI issues. As patient shows no obstructive s/sx, discussed with JOCE at the  "bedside regarding outpatient referral and follow up with GI. They are agreeable.    She had been hydrated, oral intake encouraged, including adequate hydration to prevent dehydration again. All questions answered to the best of my ability at this time to patient/family satisfaction. Plan of care agreed upon by all.     Please see above list of diagnoses and related plan for additional information.     Condition at Discharge: stable    Discharge Day Visit / Exam:   Subjective:  patient denies issues; sigrid at bedside reports she will under-report symptoms anyway. Discussed last colonoscope findings and likely need for CRS evaluation outpatient.   Vitals: Blood Pressure: 156/62 (09/03/24 0750)  Pulse: 67 (09/03/24 0750)  Temperature: 98 °F (36.7 °C) (09/03/24 0750)  Temp Source: Oral (09/03/24 0750)  Respirations: 18 (09/03/24 0750)  Height: 4' 11\" (149.9 cm) (09/01/24 1914)  Weight - Scale: 58.9 kg (129 lb 13.6 oz) (09/01/24 1914)  SpO2: 96 % (09/03/24 0750)  Exam:   Physical Exam  Vitals and nursing note reviewed.   Constitutional:       General: She is awake. She is not in acute distress.     Appearance: Normal appearance. She is well-developed and well-groomed. She is not ill-appearing or toxic-appearing.   HENT:      Head: Normocephalic and atraumatic.      Comments: [place]     Mouth/Throat:      Mouth: Mucous membranes are moist.   Cardiovascular:      Rate and Rhythm: Normal rate.   Pulmonary:      Effort: Pulmonary effort is normal. No respiratory distress.   Abdominal:      General: There is no distension.      Palpations: Abdomen is soft.   Skin:     General: Skin is warm and dry.      Coloration: Skin is not jaundiced or pale.   Neurological:      General: No focal deficit present.      Mental Status: She is alert. Mental status is at baseline.   Psychiatric:         Mood and Affect: Mood normal.         Behavior: Behavior normal. Behavior is cooperative.          Discussion with Family: Updated  " (son in law) at bedside.    Discharge instructions/Information to patient and family:   See after visit summary for information provided to patient and family.      Provisions for Follow-Up Care:  See after visit summary for information related to follow-up care and any pertinent home health orders.      Mobility at time of Discharge:   Basic Mobility Inpatient Raw Score: 20  JH-HLM Goal: 6: Walk 10 steps or more  JH-HLM Achieved: 7: Walk 25 feet or more  HLM Goal achieved. Continue to encourage appropriate mobility.     Disposition:   Home    Planned Readmission: no     Discharge Statement:  I spent 45 minutes discharging the patient. This time was spent on the day of discharge. I had direct contact with the patient on the day of discharge. Greater than 50% of the total time was spent examining patient, answering all patient questions, arranging and discussing plan of care with patient as well as directly providing post-discharge instructions.  Additional time then spent on discharge activities.    Discharge Medications:  See after visit summary for reconciled discharge medications provided to patient and/or family.      **Please Note: This note may have been constructed using a voice recognition system**

## 2024-09-03 NOTE — ASSESSMENT & PLAN NOTE
Lengthy discussion with daughter at bedside by (prior provider) who expresses frustrated as patient will downplay her symptoms; explosive symptoms for months, per daughter report   CA-125 normal; AFP normal

## 2024-09-03 NOTE — ASSESSMENT & PLAN NOTE
Mildly elevated systolic blood pressure upon presentation  Continue home regimen including Cozaar, Toprol with parameters  Blood Pressure: 156/62

## 2024-09-03 NOTE — ASSESSMENT & PLAN NOTE
Blood Sugar Average: Last 72 hrs:(P) 122  Most recent A1c 6.6 indicating adequate outpatient control/compliance  Resume Jardiance

## 2024-09-03 NOTE — MALNUTRITION/BMI
This medical record reflects one or more clinical indicators suggestive of malnutrition     Malnutrition Findings:   Adult Malnutrition type: Chronic illness  Adult Degree of Malnutrition: Other severe protein calorie malnutrition  Malnutrition Characteristics: Weight loss, Inadequate energy          360 Statement: Related to diarrhea/malabsorption as evidenced by 14.5% weight loss over the past 3 months and <75% energy intake needs met >1 month treated with diet and oral nutrition supplements        See Nutrition note dated 9/3/24 for additional details.  Completed nutrition assessment is viewable in the nutrition documentation.

## 2024-09-05 ENCOUNTER — APPOINTMENT (OUTPATIENT)
Dept: LAB | Facility: HOSPITAL | Age: 84
End: 2024-09-05
Payer: MEDICARE

## 2024-09-05 ENCOUNTER — OFFICE VISIT (OUTPATIENT)
Age: 84
End: 2024-09-05
Payer: MEDICARE

## 2024-09-05 VITALS
BODY MASS INDEX: 26 KG/M2 | HEIGHT: 59 IN | SYSTOLIC BLOOD PRESSURE: 144 MMHG | WEIGHT: 129 LBS | HEART RATE: 113 BPM | OXYGEN SATURATION: 97 % | DIASTOLIC BLOOD PRESSURE: 70 MMHG

## 2024-09-05 DIAGNOSIS — R19.7 DIARRHEA, UNSPECIFIED TYPE: Primary | ICD-10-CM

## 2024-09-05 DIAGNOSIS — R19.7 DIARRHEA: ICD-10-CM

## 2024-09-05 DIAGNOSIS — R19.7 DIARRHEA, UNSPECIFIED TYPE: ICD-10-CM

## 2024-09-05 PROCEDURE — 83993 ASSAY FOR CALPROTECTIN FECAL: CPT

## 2024-09-05 PROCEDURE — 87177 OVA AND PARASITES SMEARS: CPT

## 2024-09-05 PROCEDURE — 87209 SMEAR COMPLEX STAIN: CPT

## 2024-09-05 PROCEDURE — 87506 IADNA-DNA/RNA PROBE TQ 6-11: CPT

## 2024-09-05 PROCEDURE — G2211 COMPLEX E/M VISIT ADD ON: HCPCS | Performed by: INTERNAL MEDICINE

## 2024-09-05 PROCEDURE — 82653 EL-1 FECAL QUANTITATIVE: CPT

## 2024-09-05 PROCEDURE — 99214 OFFICE O/P EST MOD 30 MIN: CPT | Performed by: INTERNAL MEDICINE

## 2024-09-05 RX ORDER — MONTELUKAST SODIUM 4 MG/1
2 TABLET, CHEWABLE ORAL 2 TIMES DAILY
Qty: 120 TABLET | Refills: 3 | Status: SHIPPED | OUTPATIENT
Start: 2024-09-05

## 2024-09-05 RX ORDER — DICYCLOMINE HCL 20 MG
20 TABLET ORAL EVERY 6 HOURS
Qty: 120 TABLET | Refills: 2 | Status: SHIPPED | OUTPATIENT
Start: 2024-09-05

## 2024-09-05 NOTE — PROGRESS NOTES
[facility] - Outpatient Note  Violet Dick 84 y.o. female MRN: [id number]  Encounter: [id number]      ASSESSMENT AND PLAN:    Violet Dick is a 84 y.o. old pleasant female with PMH of cervical cancer status post chemoradiation who presents for followup    Chronic diarrhea-unclear etiology but patient with up to 15 bowel movements per day for the past 3 to 6 months per daughter in room.  Previous C. difficile, stool enteric bacterial panel and ova parasites unremarkable.  Previous colonoscopy showed sigmoid stricture with biopsies of sigmoid stricture unremarkable.  Recheck stool studies and rule out a some new studies  Check SIBO breath Test  Check Ct enterography to help rule out radiation enteritis  Increase Imodium to 60 mg daily  Switch from cholestyramine to colestipol as she has not been able to take the powder  Start Bentyl  Consider repeating flex sig however this time with more biopsies for microscopic colitis.  Previous colonoscopy with sigmoid stricture noted and sigmoid stricture biopsies were benign  Consider rifaximin trial in the future  Consider dairy free diet in the future          There are no diagnoses linked to this encounter.  ______________________________________________________________________    SUBJECTIVE:      3-6 months of diarrhea, 15-17 BM's per day, nonbloody, can occur throughout the day and sometimes at night.    Fecal incontinence    Chemoradiaiton 12-13 years, cervical cancer    Recent colonoscopy unremarkable aside from sigmoid stricture      Answers submitted by the patient for this visit:  Abdominal Pain Questionnaire (Submitted on 9/4/2024)  Chief Complaint: Abdominal pain  Chronicity: chronic  Onset: more than 1 year ago  Progression since onset: unchanged  Radiates to: back  anorexia: Yes  diarrhea: Yes  frequency: Yes  weight loss: Yes  Diagnostic workup: CT scan, lower endoscopy      I reviewed prior external notes    I reviewed previous  "lab results and images      REVIEW OF SYSTEMS:     REVIEW OF ALL OTHER SYSTEMS IS OTHERWISE NEGATIVE.      Historical Information   Past Medical History:   Diagnosis Date    Cancer (HCC)     cervical 10 years ago- chemo radiation    Diabetes mellitus (HCC)     Diverticulitis     [place] (hard of hearing)     Hypertension      Past Surgical History:   Procedure Laterality Date    CATARACT EXTRACTION      ELBOW SURGERY Right     HYSTERECTOMY      partial    TOE SURGERY      Daughter unsure side     Social History   Social History     Substance and Sexual Activity   Alcohol Use Never     Social History     Substance and Sexual Activity   Drug Use Never     Social History     Tobacco Use   Smoking Status Never   Smokeless Tobacco Never     History reviewed. No pertinent family history.    Meds/Allergies       Current Outpatient Medications:     aspirin 81 mg chewable tablet    atorvastatin (LIPITOR) 20 mg tablet    cholestyramine (QUESTRAN) 4 g packet    dicyclomine (BENTYL) 20 mg tablet    donepezil (ARICEPT) 10 mg tablet    Empagliflozin (Jardiance) 25 MG TABS    losartan (COZAAR) 25 mg tablet    magnesium Oxide (MAG-OX) 400 mg TABS    metoprolol succinate (TOPROL-XL) 25 mg 24 hr tablet    potassium chloride (MICRO-K) 10 MEQ CR capsule    PSYLLIUM HUSK PO    No Known Allergies        Objective     Blood pressure 144/70, pulse (!) 113, height 4' 11\" (1.499 m), weight 58.5 kg (129 lb), SpO2 97%. Body mass index is 26.05 kg/m².      PHYSICAL EXAM:      General Appearance:   Alert, cooperative, no distress   HEENT:   Normocephalic, atraumatic, anicteric.     Neck:  Supple, symmetrical, trachea midline   Lungs:   Clear to auscultation bilaterally; no rales, rhonchi or wheezing; respirations unlabored    Heart::   Regular rate and rhythm; no murmur, rub, or gallop.   Abdomen:   Soft, non-tender, non-distended; normal bowel sounds; no masses, no organomegaly    Genitalia:   Deferred    Rectal:   Deferred    Extremities:  No " cyanosis, clubbing or edema    Pulses:  2+ and symmetric    Skin:  No jaundice, rashes, or lesions    Lymph nodes:  No palpable cervical lymphadenopathy        Lab Results:   No visits with results within 1 Day(s) from this visit.   Latest known visit with results is:   Admission on 09/01/2024, Discharged on 09/03/2024   Component Date Value    WBC 09/01/2024 9.43     RBC 09/01/2024 5.36 (H)     Hemoglobin 09/01/2024 14.8     Hematocrit 09/01/2024 45.7     MCV 09/01/2024 85     MCH 09/01/2024 27.6     MCHC 09/01/2024 32.4     RDW 09/01/2024 15.0     MPV 09/01/2024 8.9     Platelets 09/01/2024 305     nRBC 09/01/2024 0     Segmented % 09/01/2024 74     Immature Grans % 09/01/2024 1     Lymphocytes % 09/01/2024 15     Monocytes % 09/01/2024 8     Eosinophils Relative 09/01/2024 1     Basophils Relative 09/01/2024 1     Absolute Neutrophils 09/01/2024 7.13     Absolute Immature Grans 09/01/2024 0.05     Absolute Lymphocytes 09/01/2024 1.38     Absolute Monocytes 09/01/2024 0.73     Eosinophils Absolute 09/01/2024 0.09     Basophils Absolute 09/01/2024 0.05     Sodium 09/01/2024 139     Potassium 09/01/2024 2.7 (L)     Chloride 09/01/2024 100     CO2 09/01/2024 27     ANION GAP 09/01/2024 12     BUN 09/01/2024 15     Creatinine 09/01/2024 0.83     Glucose 09/01/2024 159 (H)     Calcium 09/01/2024 8.1 (L)     eGFR 09/01/2024 64     Total Bilirubin 09/01/2024 0.95     Bilirubin, Direct 09/01/2024 0.09     Alkaline Phosphatase 09/01/2024 82     AST 09/01/2024 12 (L)     ALT 09/01/2024 7     Total Protein 09/01/2024 7.2     Albumin 09/01/2024 3.8     LACTIC ACID 09/01/2024 2.9 (H)     hs TnI 0hr 09/01/2024 4     C.difficile toxin by PCR 09/01/2024 Negative     LACTIC ACID 09/01/2024 1.3     POC Glucose 09/01/2024 111     POC Glucose 09/02/2024 110     Sodium 09/02/2024 140     Potassium 09/02/2024 3.2 (L)     Chloride 09/02/2024 106     CO2 09/02/2024 27     ANION GAP 09/02/2024 7     BUN 09/02/2024 12     Creatinine  09/02/2024 0.70     Glucose 09/02/2024 110     Calcium 09/02/2024 7.6 (L)     eGFR 09/02/2024 79     WBC 09/02/2024 6.99     RBC 09/02/2024 4.75     Hemoglobin 09/02/2024 13.3     Hematocrit 09/02/2024 40.0     MCV 09/02/2024 84     MCH 09/02/2024 28.0     MCHC 09/02/2024 33.3     RDW 09/02/2024 15.0     Platelets 09/02/2024 224     MPV 09/02/2024 8.7 (L)      09/02/2024 4.7     AFP TUMOR MARKER 09/02/2024 2.06     POC Glucose 09/02/2024 184 (H)     POC Glucose 09/02/2024 92     POC Glucose 09/02/2024 118     POC Glucose 09/03/2024 117     Sodium 09/03/2024 138     Potassium 09/03/2024 3.2 (L)     Chloride 09/03/2024 103     CO2 09/03/2024 27     ANION GAP 09/03/2024 8     BUN 09/03/2024 10     Creatinine 09/03/2024 0.71     Glucose 09/03/2024 121     Calcium 09/03/2024 7.5 (L)     Corrected Calcium 09/03/2024 8.0 (L)     AST 09/03/2024 10 (L)     ALT 09/03/2024 7     Alkaline Phosphatase 09/03/2024 80     Total Protein 09/03/2024 6.0 (L)     Albumin 09/03/2024 3.4 (L)     Total Bilirubin 09/03/2024 0.85     eGFR 09/03/2024 78     Magnesium 09/03/2024 1.0 (LL)          Radiology Results:   CT abdomen pelvis wo contrast    Result Date: 9/1/2024  Narrative: CT ABDOMEN AND PELVIS WITHOUT IV CONTRAST INDICATION: diarrhea, h/o diverticulitis. COMPARISON: None. TECHNIQUE: CT examination of the abdomen and pelvis was performed without intravenous contrast. Multiplanar 2D reformatted images were created from the source data. This examination, like all CT scans performed in the [facility], was performed utilizing techniques to minimize radiation dose exposure, including the use of iterative reconstruction and automated exposure control. Radiation dose length product (DLP) for this visit: 635 mGy-cm Enteric Contrast: Not administered. FINDINGS: ABDOMEN LOWER CHEST: No clinically significant abnormality in the visualized lower chest. LIVER/BILIARY TREE: Unremarkable. GALLBLADDER: Cholelithiasis without  findings of acute cholecystitis. SPLEEN: Unremarkable. PANCREAS: Unremarkable. ADRENAL GLANDS: Unremarkable. KIDNEYS/URETERS: Unremarkable. No hydronephrosis. STOMACH AND BOWEL: Colonic diverticulosis without findings of acute diverticulitis.. Mild pericolonic inflammatory change with liquid stool at the rectosigmoid colon. APPENDIX: No findings to suggest appendicitis. ABDOMINOPELVIC CAVITY: No ascites. No pneumoperitoneum. No lymphadenopathy. VESSELS: Atherosclerosis without abdominal aortic aneurysm. PELVIS REPRODUCTIVE ORGANS: Post hysterectomy. URINARY BLADDER: Unremarkable. ABDOMINAL WALL/INGUINAL REGIONS: Unremarkable. BONES: No acute fracture or suspicious osseous lesion.     Impression: Findings consistent with proctocolitis Colonic diverticulosis Workstation performed: [id number]     CT abdomen pelvis with contrast    Result Date: 8/9/2024  Narrative: CT ABDOMEN AND PELVIS WITH IV CONTRAST INDICATION: Right flank pain. COMPARISON: 7/15/2024 TECHNIQUE: CT examination of the abdomen and pelvis was performed. Multiplanar 2D reformatted images were created from the source data. This examination, like all CT scans performed [facility], was performed utilizing techniques to minimize radiation dose exposure, including the use of iterative reconstruction and automated exposure control. Radiation dose length product (DLP) for this visit: 879 mGy-cm IV Contrast: 100 mL of iohexol (OMNIPAQUE) Enteric Contrast: Not administered. FINDINGS: ABDOMEN LOWER CHEST: Clear lung bases. LIVER/BILIARY TREE: Unremarkable. GALLBLADDER: Punctate gallstones Portion of the gallbladder.. SPLEEN: Unremarkable. PANCREAS: Unremarkable. ADRENAL GLANDS: Unremarkable. KIDNEYS/URETERS: Symmetric nephrographic phase enhancement of the kidneys. No obstructive uropathy. Diverticulosis without evidence of diverticulitis or colitis. APPENDIX: No findings to suggest appendicitis. ABDOMINOPELVIC CAVITY: No ascites. No  pneumoperitoneum. No lymphadenopathy. VESSELS: Calcified plaque throughout the abdominal aorta and iliac arteries PELVIS REPRODUCTIVE ORGANS: No pelvic mass or fluid. Prior hysterectomy. URINARY BLADDER: Bladder wall enhancement. ABDOMINAL WALL/INGUINAL REGIONS: Unremarkable. BONES: No lytic or blastic lesion. L4 anterolisthesis L4-5 millimeters likely secondary to severe disc and facet degenerative change, stable.     Impression: 1. Findings suggestive of cystitis. 2. No evidence of pyelonephritis or obstructive uropathy. Workstation performed: [id number]

## 2024-09-06 LAB
C COLI+JEJUNI TUF STL QL NAA+PROBE: NEGATIVE
C DIFF TOX GENS STL QL NAA+PROBE: NEGATIVE
CALPROTECTIN STL-MCNC: 16.6 ÂΜG/G
EC STX1+STX2 GENES STL QL NAA+PROBE: NEGATIVE
ELASTASE PANC STL-MCNT: 41.1 UG/G
SALMONELLA SP SPAO STL QL NAA+PROBE: NEGATIVE
SHIGELLA SP+EIEC IPAH STL QL NAA+PROBE: NEGATIVE

## 2024-09-08 PROCEDURE — 99285 EMERGENCY DEPT VISIT HI MDM: CPT

## 2024-09-09 ENCOUNTER — APPOINTMENT (EMERGENCY)
Dept: CT IMAGING | Facility: HOSPITAL | Age: 84
End: 2024-09-09
Payer: MEDICARE

## 2024-09-09 ENCOUNTER — APPOINTMENT (EMERGENCY)
Dept: RADIOLOGY | Facility: HOSPITAL | Age: 84
End: 2024-09-09
Payer: MEDICARE

## 2024-09-09 ENCOUNTER — HOSPITAL ENCOUNTER (EMERGENCY)
Facility: HOSPITAL | Age: 84
Discharge: HOME | End: 2024-09-09
Attending: EMERGENCY MEDICINE
Payer: MEDICARE

## 2024-09-09 VITALS
TEMPERATURE: 97.6 F | SYSTOLIC BLOOD PRESSURE: 160 MMHG | OXYGEN SATURATION: 94 % | HEART RATE: 99 BPM | DIASTOLIC BLOOD PRESSURE: 80 MMHG | RESPIRATION RATE: 18 BRPM
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
## 2024-09-09 DIAGNOSIS — K86.89 PANCREATIC INSUFFICIENCY: Primary | ICD-10-CM

## 2024-09-09 DIAGNOSIS — S09.90XA CLOSED HEAD INJURY, INITIAL ENCOUNTER: ICD-10-CM

## 2024-09-09 LAB
ALBUMIN SERPL BCG-MCNC: 4 G/DL (ref 3.5–5)
ALP SERPL-CCNC: 87 U/L (ref 34–104)
ALT SERPL W P-5'-P-CCNC: 7 U/L (ref 7–52)
ANION GAP SERPL CALCULATED.3IONS-SCNC: 9 MMOL/L (ref 4–13)
APTT PPP: 28 SECONDS (ref 23–34)
AST SERPL W P-5'-P-CCNC: 12 U/L (ref 13–39)
ATRIAL RATE: 103 BPM
BASOPHILS # BLD AUTO: 0.04 THOUSANDS/ÂΜL (ref 0–0.1)
BASOPHILS NFR BLD AUTO: 0 % (ref 0–1)
BILIRUB SERPL-MCNC: 0.91 MG/DL (ref 0.2–1)
BUN SERPL-MCNC: 11 MG/DL (ref 5–25)
CALCIUM SERPL-MCNC: 9.7 MG/DL (ref 8.4–10.2)
CHLORIDE SERPL-SCNC: 98 MMOL/L (ref 96–108)
CO2 SERPL-SCNC: 27 MMOL/L (ref 21–32)
CREAT SERPL-MCNC: 0.84 MG/DL (ref 0.6–1.3)
EOSINOPHIL # BLD AUTO: 0.09 THOUSAND/ÂΜL (ref 0–0.61)
EOSINOPHIL NFR BLD AUTO: 1 % (ref 0–6)
ERYTHROCYTE [DISTWIDTH] IN BLOOD BY AUTOMATED COUNT: 15 % (ref 11.6–15.1)
GFR SERPL CREATININE-BSD FRML MDRD: 64 ML/MIN/1.73SQ M
GLUCOSE SERPL-MCNC: 134 MG/DL (ref 65–140)
HCT VFR BLD AUTO: 46.2 % (ref 34.8–46.1)
HGB BLD-MCNC: 14.9 G/DL (ref 11.5–15.4)
IMM GRANULOCYTES # BLD AUTO: 0.08 THOUSAND/UL (ref 0–0.2)
IMM GRANULOCYTES NFR BLD AUTO: 1 % (ref 0–2)
INR PPP: 1.16 (ref 0.85–1.19)
LYMPHOCYTES # BLD AUTO: 1.21 THOUSANDS/ÂΜL (ref 0.6–4.47)
LYMPHOCYTES NFR BLD AUTO: 13 % (ref 14–44)
MCH RBC QN AUTO: 27.6 PG (ref 26.8–34.3)
MCHC RBC AUTO-ENTMCNC: 32.3 G/DL (ref 31.4–37.4)
MCV RBC AUTO: 86 FL (ref 82–98)
MONOCYTES # BLD AUTO: 0.6 THOUSAND/ÂΜL (ref 0.17–1.22)
MONOCYTES NFR BLD AUTO: 6 % (ref 4–12)
NEUTROPHILS # BLD AUTO: 7.39 THOUSANDS/ÂΜL (ref 1.85–7.62)
NEUTS SEG NFR BLD AUTO: 79 % (ref 43–75)
NRBC BLD AUTO-RTO: 0 /100 WBCS
P AXIS: 56 DEGREES
PLATELET # BLD AUTO: 278 THOUSANDS/UL (ref 149–390)
PMV BLD AUTO: 8.8 FL (ref 8.9–12.7)
POTASSIUM SERPL-SCNC: 5.9 MMOL/L (ref 3.5–5.3)
PR INTERVAL: 164 MS
PROT SERPL-MCNC: 7.2 G/DL (ref 6.4–8.4)
PROTHROMBIN TIME: 15.6 SECONDS (ref 12.3–15)
QRS AXIS: -26 DEGREES
QRSD INTERVAL: 76 MS
QT INTERVAL: 338 MS
QTC INTERVAL: 442 MS
RBC # BLD AUTO: 5.39 MILLION/UL (ref 3.81–5.12)
SODIUM SERPL-SCNC: 134 MMOL/L (ref 135–147)
T WAVE AXIS: 75 DEGREES
VENTRICULAR RATE: 103 BPM
WBC # BLD AUTO: 9.41 THOUSAND/UL (ref 4.31–10.16)

## 2024-09-09 PROCEDURE — 85025 COMPLETE CBC W/AUTO DIFF WBC: CPT | Performed by: EMERGENCY MEDICINE

## 2024-09-09 PROCEDURE — 99285 EMERGENCY DEPT VISIT HI MDM: CPT | Performed by: EMERGENCY MEDICINE

## 2024-09-09 PROCEDURE — 70450 CT HEAD/BRAIN W/O DYE: CPT

## 2024-09-09 PROCEDURE — 72125 CT NECK SPINE W/O DYE: CPT

## 2024-09-09 PROCEDURE — 85730 THROMBOPLASTIN TIME PARTIAL: CPT | Performed by: EMERGENCY MEDICINE

## 2024-09-09 PROCEDURE — 93005 ELECTROCARDIOGRAM TRACING: CPT

## 2024-09-09 PROCEDURE — 93010 ELECTROCARDIOGRAM REPORT: CPT | Performed by: INTERNAL MEDICINE

## 2024-09-09 PROCEDURE — 85610 PROTHROMBIN TIME: CPT | Performed by: EMERGENCY MEDICINE

## 2024-09-09 PROCEDURE — 71045 X-RAY EXAM CHEST 1 VIEW: CPT

## 2024-09-09 PROCEDURE — 36415 COLL VENOUS BLD VENIPUNCTURE: CPT | Performed by: EMERGENCY MEDICINE

## 2024-09-09 PROCEDURE — 80053 COMPREHEN METABOLIC PANEL: CPT | Performed by: EMERGENCY MEDICINE

## 2024-09-09 RX ORDER — CLOPIDOGREL BISULFATE 75 MG/1
75 TABLET ORAL DAILY
COMMUNITY

## 2024-09-09 NOTE — ED PROVIDER NOTES
History  Chief Complaint   Patient presents with    Fall     Pt arrives via hospital wheelchair with a c/o fall from standing about 15 min ago. +HS, +BT, unknown LOC     HPI    Prior to Admission Medications   Prescriptions Last Dose Informant Patient Reported? Taking?   Empagliflozin (Jardiance) 25 MG TABS  Self, Child Yes No   Sig: Take 25 mg by mouth every evening   PSYLLIUM HUSK PO  Self, Child Yes No   Sig: Take by mouth   atorvastatin (LIPITOR) 20 mg tablet  Self, Child Yes No   Sig: Take 20 mg by mouth daily at bedtime   clopidogrel (PLAVIX) 75 mg tablet   Yes Yes   Sig: Take 75 mg by mouth daily   colestipol (COLESTID) 1 g tablet   No No   Sig: Take 2 tablets (2 g total) by mouth 2 (two) times a day   dicyclomine (BENTYL) 20 mg tablet   No No   Sig: Take 1 tablet (20 mg total) by mouth every 6 (six) hours   donepezil (ARICEPT) 10 mg tablet  Self, Child Yes No   Sig: take 1 tablet by mouth every day at night   losartan (COZAAR) 25 mg tablet  Self, Child Yes No   Sig: Take 25 mg by mouth daily After dinner   magnesium Oxide (MAG-OX) 400 mg TABS  Self, Child No No   Sig: Take 2 tablets (800 mg total) by mouth 2 (two) times a day   potassium chloride (MICRO-K) 10 MEQ CR capsule  Self, Child No No   Sig: Take 2 capsules (20 mEq total) by mouth 2 (two) times a day      Facility-Administered Medications: None       Past Medical History:   Diagnosis Date    Cancer (HCC)     cervical 10 years ago- chemo radiation    Diabetes mellitus (HCC)     Diverticulitis     [place] (hard of hearing)     Hypertension        Past Surgical History:   Procedure Laterality Date    CATARACT EXTRACTION      ELBOW SURGERY Right     HYSTERECTOMY      partial    TOE SURGERY      Daughter unsure side       History reviewed. No pertinent family history.  I have reviewed and agree with the history as documented.    E-Cigarette/Vaping    E-Cigarette Use Never User      E-Cigarette/Vaping Substances    Nicotine No     THC No     CBD No      Flavoring No     Other No     Unknown No      Social History     Tobacco Use    Smoking status: Never    Smokeless tobacco: Never   Vaping Use    Vaping status: Never Used   Substance Use Topics    Alcohol use: Never    Drug use: Never       Review of Systems   Constitutional:  Negative for fever.   Cardiovascular:  Negative for chest pain.   Gastrointestinal:  Negative for vomiting.   Musculoskeletal:  Negative for arthralgias.   Neurological:  Negative for headaches.   All other systems reviewed and are negative.      Physical Exam  Primary Survey:   Airway:        Status: patent;                Hospital Interventions: none  Breathing:               Effort: normal       Right breath sounds: normal       Left breath sounds: normal  Circulation:        Rhythm: regular       Rate: tachycardia   Right Pulses Left Pulses                        Disability:        GCS: Eye: 4; Verbal: 4 Motor: 6 Total: 14       Right Pupil: round;  reactive         Left Pupil:  round;  reactive      R Motor Strength L Motor Strength             Sensory:  No sensory deficit  Exposure:       Completed: Yes          Physical Exam  Vitals and nursing note reviewed.   Constitutional:       General: She is awake. She is not in acute distress.     Appearance: She is not toxic-appearing.   HENT:      Head: Normocephalic and atraumatic. No raccoon eyes, contusion or laceration.      Nose: Nose normal.      Mouth/Throat:      Lips: Pink.   Eyes:      General: Vision grossly intact. Gaze aligned appropriately.   Cardiovascular:      Rate and Rhythm: Regular rhythm. Tachycardia present.      Heart sounds: Normal heart sounds.   Pulmonary:      Effort: Pulmonary effort is normal. No respiratory distress.      Breath sounds: Normal breath sounds.   Chest:      Chest wall: No deformity or tenderness.   Abdominal:      General: There is no distension.      Palpations: Abdomen is soft.      Tenderness: There is no abdominal tenderness.   Musculoskeletal:       Cervical back: Full passive range of motion without pain and neck supple. No deformity or bony tenderness. No spinous process tenderness.      Thoracic back: No deformity or bony tenderness.      Lumbar back: No deformity or bony tenderness.      Comments: No bruising, lacerations, tenderness, or deformities noted on patient's back.  No CVA tenderness.   Skin:     General: Skin is warm and dry.   Neurological:      General: No focal deficit present.      Mental Status: She is alert. Mental status is at baseline.         Vital Signs  ED Triage Vitals [09/09/24 0000]   Temperature Pulse Respirations Blood Pressure SpO2   97.6 °F (36.4 °C) (!) 113 20 140/64 98 %      Temp Source Heart Rate Source Patient Position - Orthostatic VS BP Location FiO2 (%)   Oral Monitor -- Left arm --      Pain Score       No Pain           Vitals:    09/09/24 0000   BP: 140/64   Pulse: (!) 113         Visual Acuity      ED Medications  Medications - No data to display    Diagnostic Studies  Results Reviewed       Procedure Component Value Units Date/Time    CBC and differential [[id number]]     Lab Status: No result Specimen: Blood     Comprehensive metabolic panel [[id number]]     Lab Status: No result Specimen: Blood     Protime-INR [[id number]]     Lab Status: No result Specimen: Blood     APTT [[id number]]     Lab Status: No result Specimen: Blood     UA w Reflex to Microscopic w Reflex to Culture [[id number]]     Lab Status: No result Specimen: Urine                    TRAUMA - CT head wo contrast    (Results Pending)   TRAUMA - CT spine cervical wo contrast    (Results Pending)   XR Trauma chest portable    (Results Pending)              Procedures  Procedures         ED Course                                 SBIRT 22yo+      Flowsheet Row Most Recent Value   Initial Alcohol Screen: US AUDIT-C     1. How often do you have a drink containing alcohol? 0 Filed at: 09/09/2024 0000   2. How many drinks containing alcohol do you have on a  typical day you are drinking?  0 Filed at: 09/09/2024 0000   3b. FEMALE Any Age, or MALE 65+: How often do you have 4 or more drinks on one occassion? 0 Filed at: 09/09/2024 0000   Audit-C Score 0 Filed at: 09/09/2024 0000   DIVYA: How many times in the past year have you...    Used an illegal drug or used a prescription medication for non-medical reasons? Never Filed at: 09/09/2024 0000                      Medical Decision Making  Amount and/or Complexity of Data Reviewed  Labs: ordered.  Radiology: ordered.                 Disposition  Final diagnoses:   None     ED Disposition       None          Follow-up Information    None         Patient's Medications   Discharge Prescriptions    No medications on file       No discharge procedures on file.    PDMP Review       None            ED Provider  Electronically Signed by

## 2024-09-10 LAB
G LAMBLIA AG STL QL IA: NEGATIVE
O+P STL CONC: NORMAL

## 2024-09-16 ENCOUNTER — HOSPITAL ENCOUNTER (EMERGENCY)
Facility: HOSPITAL | Age: 84
Discharge: HOME | End: 2024-09-16
Payer: MEDICARE

## 2024-09-16 ENCOUNTER — NURSE TRIAGE (OUTPATIENT)
Age: 84
End: 2024-09-16

## 2024-09-16 VITALS
TEMPERATURE: 98.1 F | OXYGEN SATURATION: 99 % | SYSTOLIC BLOOD PRESSURE: 130 MMHG | HEART RATE: 100 BPM | RESPIRATION RATE: 20 BRPM | DIASTOLIC BLOOD PRESSURE: 66 MMHG | WEIGHT: 127.65 LBS | BODY MASS INDEX: 25.78 KG/M2

## 2024-09-16 DIAGNOSIS — R63.0 DECREASED APPETITE: Primary | ICD-10-CM

## 2024-09-16 LAB
ALBUMIN SERPL BCG-MCNC: 4.2 G/DL (ref 3.5–5)
ALP SERPL-CCNC: 77 U/L (ref 34–104)
ALT SERPL W P-5'-P-CCNC: 5 U/L (ref 7–52)
ANION GAP SERPL CALCULATED.3IONS-SCNC: 9 MMOL/L (ref 4–13)
AST SERPL W P-5'-P-CCNC: 10 U/L (ref 13–39)
BACTERIA UR QL AUTO: ABNORMAL /HPF
BASOPHILS # BLD AUTO: 0.07 THOUSANDS/ΜL (ref 0–0.1)
BASOPHILS NFR BLD AUTO: 1 % (ref 0–1)
BILIRUB SERPL-MCNC: 0.65 MG/DL (ref 0.2–1)
BILIRUB UR QL STRIP: NEGATIVE
BUN SERPL-MCNC: 34 MG/DL (ref 5–25)
CALCIUM SERPL-MCNC: 9.6 MG/DL (ref 8.4–10.2)
CHLORIDE SERPL-SCNC: 97 MMOL/L (ref 96–108)
CLARITY UR: CLEAR
CO2 SERPL-SCNC: 26 MMOL/L (ref 21–32)
COLOR UR: YELLOW
CREAT SERPL-MCNC: 0.94 MG/DL (ref 0.6–1.3)
EOSINOPHIL # BLD AUTO: 0.02 THOUSAND/ΜL (ref 0–0.61)
EOSINOPHIL NFR BLD AUTO: 0 % (ref 0–6)
ERYTHROCYTE [DISTWIDTH] IN BLOOD BY AUTOMATED COUNT: 14.8 % (ref 11.6–15.1)
GFR SERPL CREATININE-BSD FRML MDRD: 55 ML/MIN/1.73SQ M
GLUCOSE SERPL-MCNC: 123 MG/DL (ref 65–140)
GLUCOSE UR STRIP-MCNC: ABNORMAL MG/DL
HCT VFR BLD AUTO: 46.5 % (ref 34.8–46.1)
HGB BLD-MCNC: 15 G/DL (ref 11.5–15.4)
HGB UR QL STRIP.AUTO: NEGATIVE
IMM GRANULOCYTES # BLD AUTO: 0.1 THOUSAND/UL (ref 0–0.2)
IMM GRANULOCYTES NFR BLD AUTO: 1 % (ref 0–2)
KETONES UR STRIP-MCNC: ABNORMAL MG/DL
LEUKOCYTE ESTERASE UR QL STRIP: ABNORMAL
LYMPHOCYTES # BLD AUTO: 1.43 THOUSANDS/ΜL (ref 0.6–4.47)
LYMPHOCYTES NFR BLD AUTO: 12 % (ref 14–44)
MAGNESIUM SERPL-MCNC: 2.2 MG/DL (ref 1.9–2.7)
MCH RBC QN AUTO: 28 PG (ref 26.8–34.3)
MCHC RBC AUTO-ENTMCNC: 32.3 G/DL (ref 31.4–37.4)
MCV RBC AUTO: 87 FL (ref 82–98)
MONOCYTES # BLD AUTO: 1.14 THOUSAND/ΜL (ref 0.17–1.22)
MONOCYTES NFR BLD AUTO: 10 % (ref 4–12)
MUCOUS THREADS UR QL AUTO: ABNORMAL
NEUTROPHILS # BLD AUTO: 9.2 THOUSANDS/ΜL (ref 1.85–7.62)
NEUTS SEG NFR BLD AUTO: 76 % (ref 43–75)
NITRITE UR QL STRIP: NEGATIVE
NON-SQ EPI CELLS URNS QL MICRO: ABNORMAL /HPF
NRBC BLD AUTO-RTO: 0 /100 WBCS
PH UR STRIP.AUTO: 5 [PH]
PLATELET # BLD AUTO: 420 THOUSANDS/UL (ref 149–390)
PMV BLD AUTO: 8.8 FL (ref 8.9–12.7)
POTASSIUM SERPL-SCNC: 5.3 MMOL/L (ref 3.5–5.3)
PROT SERPL-MCNC: 7.6 G/DL (ref 6.4–8.4)
PROT UR STRIP-MCNC: ABNORMAL MG/DL
RBC # BLD AUTO: 5.36 MILLION/UL (ref 3.81–5.12)
RBC #/AREA URNS AUTO: ABNORMAL /HPF
SODIUM SERPL-SCNC: 132 MMOL/L (ref 135–147)
SP GR UR STRIP.AUTO: 1.03 (ref 1–1.03)
UROBILINOGEN UR STRIP-ACNC: <2 MG/DL
WBC # BLD AUTO: 11.96 THOUSAND/UL (ref 4.31–10.16)
WBC #/AREA URNS AUTO: ABNORMAL /HPF

## 2024-09-16 PROCEDURE — 81001 URINALYSIS AUTO W/SCOPE: CPT

## 2024-09-16 PROCEDURE — 93005 ELECTROCARDIOGRAM TRACING: CPT

## 2024-09-16 PROCEDURE — 87086 URINE CULTURE/COLONY COUNT: CPT

## 2024-09-16 PROCEDURE — 99284 EMERGENCY DEPT VISIT MOD MDM: CPT

## 2024-09-16 PROCEDURE — 85025 COMPLETE CBC W/AUTO DIFF WBC: CPT

## 2024-09-16 PROCEDURE — 96360 HYDRATION IV INFUSION INIT: CPT

## 2024-09-16 PROCEDURE — 36415 COLL VENOUS BLD VENIPUNCTURE: CPT

## 2024-09-16 PROCEDURE — 80053 COMPREHEN METABOLIC PANEL: CPT

## 2024-09-16 PROCEDURE — 83735 ASSAY OF MAGNESIUM: CPT

## 2024-09-16 RX ADMIN — SODIUM CHLORIDE 500 ML: 0.9 INJECTION, SOLUTION INTRAVENOUS at 17:05

## 2024-09-16 NOTE — TELEPHONE ENCOUNTER
"LOV 09/05/24, Colonoscopy 08/05/24    Marsha, pt's daughter, reports pt has eaten once in slightly more than a week. Pt is barely drinking and refuses encouragement. Marsha states pt has lost 30 pounds within the last month. Pt was experiencing over 15 episodes of diarrhea daily however is down to 4-5 after starting colestipol. It is unknown if pt is experiencing nausea, abdominal pain as pt declines to answer. Denies hematochezia, vomiting. Last K= 5.9 on 09/09; pt continues to receive K+ 20 Meq BID. Daughter reports she was told to continue giving to pt due to the amount of diarrhea she was producing.   Marsha states she has taken pt to the ED for the same over 9 times in the past month. ED provides IV resuscitation and discharges pt, per Marsha. Marsha is taking pt to the [place] ED for evaluation. Marsha expresses frustration and would like to discuss further interventions to improver her mother's quality of life. Informed Marsha iw ould relay her concerns to . Daughter is agreeable.    Reason for Disposition   Drinking very little and has signs of dehydration (e.g., no urine > 12 hours, very dry mouth, very lightheaded)    Answer Assessment - Initial Assessment Questions  1. DIARRHEA SEVERITY: \"How bad is the diarrhea?\" \"How many extra stools have you had in the past 24 hours than normal?\"     - NO DIARRHEA (SCALE 0)    - MILD (SCALE 1-3): Few loose or mushy BMs; increase of 1-3 stools over normal daily number of stools; mild increase in ostomy output.    -  MODERATE (SCALE 4-7): Increase of 4-6 stools daily over normal; moderate increase in ostomy output.  * SEVERE (SCALE 8-10; OR 'WORST POSSIBLE'): Increase of 7 or more stools daily over normal; moderate increase in ostomy output; incontinence.      4-5  2. ONSET: \"When did the diarrhea begin?\"       Several months  3. BM CONSISTENCY: \"How loose or watery is the diarrhea?\"       Diarrhea/loose  4. VOMITING: \"Are you also vomiting?\" If Yes, ask: " "\"How many times in the past 24 hours?\"       denies  5. ABDOMINAL PAIN: \"Are you having any abdominal pain?\" If Yes, ask: \"What does it feel like?\" (e.g., crampy, dull, intermittent, constant)       unsure  6. ABDOMINAL PAIN SEVERITY: If present, ask: \"How bad is the pain?\"  (e.g., Scale 1-10; mild, moderate, or severe)    - MILD (1-3): doesn't interfere with normal activities, abdomen soft and not tender to touch     - MODERATE (4-7): interferes with normal activities or awakens from sleep, tender to touch     - SEVERE (8-10): excruciating pain, doubled over, unable to do any normal activities        unsure  7. ORAL INTAKE: If vomiting, \"Have you been able to drink liquids?\" \"How much fluids have you had in the past 24 hours?\"      poor  8. HYDRATION: \"Any signs of dehydration?\" (e.g., dry mouth [not just dry lips], too weak to stand, dizziness, new weight loss) \"When did you last urinate?\"      poor  9. EXPOSURE: \"Have you traveled to a foreign country recently?\" \"Have you been exposed to anyone with diarrhea?\" \"Could you have eaten any food that was spoiled?\"      N/a  10. ANTIBIOTIC USE: \"Are you taking antibiotics now or have you taken antibiotics in the past 2 months?\"        denies  11. OTHER SYMPTOMS: \"Do you have any other symptoms?\" (e.g., fever, blood in stool)        Weight loss  12. PREGNANCY: \"Is there any chance you are pregnant?\" \"When was your last menstrual period?\"        N/a    Protocols used: Diarrhea-ADULT-[place]    "

## 2024-09-16 NOTE — DISCHARGE INSTRUCTIONS
Continue to encourage fluid intake and food intake.    Speak to patient's PCP for follow up, possible appetite stimulant if necessary.    Return to the ED with any new/concerning issues.

## 2024-09-16 NOTE — TELEPHONE ENCOUNTER
Im glad diarrhea is better. Unfortunately her decreased appetite is likely from her dementia however he UA from today shows evidence of UTI so that may also be contributing to poor po intake. I see she got labs today which show normal potassium and magnesium. She should stop both potassium and magnesium supplementation as they both can cause GI symptoms including nausea. She should be drinking ensure protein supplements in the meantime. Thank you.

## 2024-09-17 ENCOUNTER — TELEPHONE (OUTPATIENT)
Age: 84
End: 2024-09-17

## 2024-09-17 LAB
ATRIAL RATE: 109 BPM
BACTERIA UR CULT: NORMAL
P AXIS: 56 DEGREES
PR INTERVAL: 158 MS
QRS AXIS: -42 DEGREES
QRSD INTERVAL: 80 MS
QT INTERVAL: 326 MS
QTC INTERVAL: 439 MS
T WAVE AXIS: 14 DEGREES
VENTRICULAR RATE: 109 BPM

## 2024-09-17 PROCEDURE — 93010 ELECTROCARDIOGRAM REPORT: CPT | Performed by: INTERNAL MEDICINE

## 2024-09-17 NOTE — TELEPHONE ENCOUNTER
Spoke with daughter, gave provider recommendations and advised to follow up with PCP in regards to UA.

## 2024-09-17 NOTE — ED PROVIDER NOTES
1. Decreased appetite      ED Disposition       ED Disposition   Discharge    Condition   Stable    Date/Time   Mon Sep 16, 2024  6:38 PM    Comment   Violet Dick discharge to home/self care.                   Assessment & Plan       Medical Decision Making  Amount and/or Complexity of Data Reviewed  Labs: ordered. Decision-making details documented in ED Course.      Patient is an 84 year old female presenting to the ED for evaluation of decreased appetite. History and clinical exam documented below. Abdominal exam is not concerning for infectious or surgical process. Patient tolerated fluids and food in the ED. Checked labs to r/o dehydration and the labs are unremarkable. Obtained UA to r/o infection and at this time without complaint of urinary symptoms, shared decision making with daughter to wait for culture results before considering antibiotic treatment. Patient hemodynamically stable. Reviewed all testing with daughter and advised PCP follow up for possible appetite stimulant.    I gave oral return precautions for what to return for in addition to the written return precautions.   The patient (and any family present: daughter) verbalized understanding of the discharge instructions and warnings that would necessitate return to the Emergency Department.  I specifically highlighted areas of special concern regarding the written and verbal discharge instructions and return precautions.    All questions were answered prior to discharge.              ED Course as of 09/16/24 2224   Mon Sep 16, 2024   1831 WBC, UA(!): 30-50   1831 Epithelial Cells: Occasional   1831 Bacteria, UA: Occasional  Contaminated sample and no history of urinary symptoms. Prior culture results have grown mixed contaminants and Candida. Will await culture prior to initiating treatment.       Medications   sodium chloride 0.9 % bolus 500 mL (0 mL Intravenous Stopped 9/16/24 1834)       History of Present Illness       HPI    Patient is an  84 year old female with PMHx HTN, DM, diverticulitis, hard of hearing, presenting to the ED with daughter at bedside for evaluation of decreased appetite over the past week. Daughter is patient's caretaker and is concerned because patient has barely drank anything or eaten any meals this week. When asked, patient states that she is hungry and wants to eat now. Patient is able to contribute to history. She denies any abdominal pain, nausea, vomiting, urinary symptoms. States that she has no chest pain or shortness of breath. Daughter has been providing patient with potassium supplements as part of her medication regimen. Daughter states that patient will only take a couple of  bites of food and only take in a little fluid each day. No history of uncontrolled weight loss, fevers, night sweats. Daughter reports no recent falls since 9/9/24 and at that time was evaluated in the ED and discharged.    Review of Systems   All other systems reviewed and are negative.          Objective     ED Triage Vitals [09/16/24 1503]   Temperature Pulse Blood Pressure Respirations SpO2 Patient Position - Orthostatic VS   98.1 °F (36.7 °C) 101 135/69 18 97 % Sitting      Temp Source Heart Rate Source BP Location FiO2 (%) Pain Score    Oral Monitor Left arm -- --        Physical Exam  Vitals and nursing note reviewed.   Constitutional:       General: She is not in acute distress.     Appearance: Normal appearance. She is well-developed and normal weight. She is not ill-appearing, toxic-appearing or diaphoretic.   HENT:      Head: Normocephalic and atraumatic.      Right Ear: External ear normal.      Left Ear: External ear normal.      Nose: Nose normal. No congestion or rhinorrhea.      Mouth/Throat:      Mouth: Mucous membranes are moist.      Pharynx: Oropharynx is clear. No oropharyngeal exudate or posterior oropharyngeal erythema.   Eyes:      Extraocular Movements: Extraocular movements intact.      Conjunctiva/sclera: Conjunctivae  normal.      Pupils: Pupils are equal, round, and reactive to light.   Cardiovascular:      Rate and Rhythm: Normal rate and regular rhythm.      Pulses: Normal pulses.      Heart sounds: Normal heart sounds. No murmur heard.  Pulmonary:      Effort: Pulmonary effort is normal. No respiratory distress.      Breath sounds: Normal breath sounds. No wheezing, rhonchi or rales.   Chest:      Chest wall: No tenderness.   Abdominal:      General: Abdomen is flat.      Palpations: Abdomen is soft.      Tenderness: There is no abdominal tenderness. There is no guarding or rebound.   Musculoskeletal:         General: No swelling. Normal range of motion.      Cervical back: Normal range of motion and neck supple. No tenderness.      Right lower leg: No edema.      Left lower leg: No edema.   Skin:     General: Skin is warm and dry.      Capillary Refill: Capillary refill takes less than 2 seconds.      Findings: No erythema or rash.   Neurological:      General: No focal deficit present.      Mental Status: She is alert and oriented to person, place, and time.      Cranial Nerves: No cranial nerve deficit.      Sensory: No sensory deficit.      Motor: No weakness.      Coordination: Coordination normal.      Gait: Gait normal.   Psychiatric:         Mood and Affect: Mood normal.         Labs Reviewed   CBC AND DIFFERENTIAL - Abnormal       Result Value    WBC 11.96 (*)     RBC 5.36 (*)     Hemoglobin 15.0      Hematocrit 46.5 (*)     MCV 87      MCH 28.0      MCHC 32.3      RDW 14.8      MPV 8.8 (*)     Platelets 420 (*)     nRBC 0      Segmented % 76 (*)     Immature Grans % 1      Lymphocytes % 12 (*)     Monocytes % 10      Eosinophils Relative 0      Basophils Relative 1      Absolute Neutrophils 9.20 (*)     Absolute Immature Grans 0.10      Absolute Lymphocytes 1.43      Absolute Monocytes 1.14      Eosinophils Absolute 0.02      Basophils Absolute 0.07     COMPREHENSIVE METABOLIC PANEL - Abnormal    Sodium 132 (*)      Potassium 5.3      Chloride 97      CO2 26      ANION GAP 9      BUN 34 (*)     Creatinine 0.94      Glucose 123      Calcium 9.6      AST 10 (*)     ALT 5 (*)     Alkaline Phosphatase 77      Total Protein 7.6      Albumin 4.2      Total Bilirubin 0.65      eGFR 55      Narrative:     [employer] guidelines for Chronic Kidney Disease (CKD):     Stage 1 with normal or high GFR (GFR > 90 mL/min/1.73 square meters)    Stage 2 Mild CKD (GFR = 60-89 mL/min/1.73 square meters)    Stage 3A Moderate CKD (GFR = 45-59 mL/min/1.73 square meters)    Stage 3B Moderate CKD (GFR = 30-44 mL/min/1.73 square meters)    Stage 4 Severe CKD (GFR = 15-29 mL/min/1.73 square meters)    Stage 5 End Stage CKD (GFR <15 mL/min/1.73 square meters)  Note: GFR calculation is accurate only with a steady state creatinine   UA W REFLEX TO MICROSCOPIC WITH REFLEX TO CULTURE - Abnormal    Color, UA Yellow      Clarity, UA Clear      Specific Gravity, UA 1.030      pH, UA 5.0      Leukocytes, UA Small (*)     Nitrite, UA Negative      Protein, UA Trace (*)     Glucose, UA >=1000 (1%) (*)     Ketones, UA 10 (1+) (*)     Urobilinogen, UA <2.0      Bilirubin, UA Negative      Occult Blood, UA Negative     URINE MICROSCOPIC - Abnormal    RBC, UA 2-4 (*)     WBC, UA 30-50 (*)     Epithelial Cells Occasional      Bacteria, UA Occasional      MUCUS THREADS Occasional (*)    MAGNESIUM - Normal    Magnesium 2.2     URINE CULTURE     No orders to display       Procedures       Ruben Justice, DO  09/16/24 [id number]

## 2024-09-17 NOTE — TELEPHONE ENCOUNTER
Patients daughter, Marsha, called stating the patient was in the ED yesterday and had a UA and culture taken. I advised the culture was still in process. Please advise.    [facility] in [place] is the preferred pharmacy.      Urine Microscopic         Component  Ref Range & Units 9/16/24 1731 8/9/24 1821 7/18/24 0103 6/16/24 2314   RBC, UA  None Seen, 1-2 /hpf 2-4 Abnormal  Innumerable Abnormal  Innumerable Abnormal  30-50 Abnormal    WBC, UA  None Seen, 1-2 /hpf 30-50 Abnormal  Innumerable Abnormal  Innumerable Abnormal  Innumerable Abnormal    Epithelial Cells  None Seen, Occasional /hpf Occasional Occasional None Seen Occasional   Bacteria, UA  None Seen, Occasional /hpf Occasional Occasional None Seen Occasional   MUCUS THREADS  None Seen

## 2024-09-18 NOTE — TELEPHONE ENCOUNTER
Spoke with pt daughter per cc informing her, her mothers Urine culture came back negative, pt daughter verbalized understanding. No further assistance

## 2024-09-20 ENCOUNTER — HOSPITAL ENCOUNTER (OUTPATIENT)
Dept: CT IMAGING | Facility: HOSPITAL | Age: 84
End: 2024-09-20
Attending: INTERNAL MEDICINE
Payer: MEDICARE

## 2024-09-20 DIAGNOSIS — R19.7 DIARRHEA, UNSPECIFIED TYPE: ICD-10-CM

## 2024-09-20 PROCEDURE — 74177 CT ABD & PELVIS W/CONTRAST: CPT

## 2024-09-20 RX ADMIN — IOHEXOL 100 ML: 350 INJECTION, SOLUTION INTRAVENOUS at 06:50

## 2024-09-24 ENCOUNTER — TELEPHONE (OUTPATIENT)
Age: 84
End: 2024-09-24

## 2024-09-24 NOTE — TELEPHONE ENCOUNTER
Patients GI provider:  Dr. Caldwell    Number to return call: ([phone]    Reason for call: Pt's daughter Shannon called regarding results of CT scan. I transferred the call to GI triage and Kady took the call.     Scheduled procedure/appointment date if applicable: Apt/procedure 03/05/25

## 2024-09-24 NOTE — TELEPHONE ENCOUNTER
Pt. Daughter called, reviewed CT results, pt. Daughter reporting pt. Has improved diarrhea since starting medication but still has no appetite and continues to lose weight, pt. Daughter wants to speak with Dr. Caldwell about this, advised message would be sent and he would reach out when available

## 2024-09-26 DIAGNOSIS — K80.20 CALCULUS OF GALLBLADDER WITHOUT CHOLECYSTITIS WITHOUT OBSTRUCTION: Primary | ICD-10-CM

## 2024-09-26 DIAGNOSIS — R63.0 POOR APPETITE: ICD-10-CM

## 2024-09-26 RX ORDER — DRONABINOL 2.5 MG/1
2.5 CAPSULE ORAL
Qty: 60 CAPSULE | Refills: 2 | Status: SHIPPED | OUTPATIENT
Start: 2024-09-26

## 2024-09-26 NOTE — TELEPHONE ENCOUNTER
Returned phone call. Gave them referral to gen surg for cholelithiasis (at daughters request) and prescribed dronabinol for appetite stimulant. Likely advanced dementia causing poor appetite. Thank you.

## 2024-09-26 NOTE — TELEPHONE ENCOUNTER
Pt's daughter, Marsha Montilla calling back.  Asking if provider can call her to discuss concerns about pt.  See prev note.  She is also concerned that Mother is losing weight and not eating.      [phone]

## 2024-09-27 ENCOUNTER — TELEPHONE (OUTPATIENT)
Age: 84
End: 2024-09-27

## 2024-09-27 NOTE — TELEPHONE ENCOUNTER
Patient daughter called to see if we send message to the office for sooner appointment. Advise Marsha we did and office will call if a sooner appointment is available.

## 2024-09-27 NOTE — TELEPHONE ENCOUNTER
Patient's daughter called to make appointment; patient has a referral for gallbladder consult/removal. Scheduled next available and added to wait list. Offered patient's daughter a consult with Dr. Whittaker in [place] with surgery at [facility], but she states patient cannot sit in car that long. Patient is experiencing discomfort and unable to eat. Told patient's daughter I would reach out to the office to get an urgent appointment slot. Office said they will call back in afternoon with updated date for patient.

## 2024-10-01 PROBLEM — R19.7 DIARRHEA OF PRESUMED INFECTIOUS ORIGIN: Status: RESOLVED | Noted: 2024-09-01 | Resolved: 2024-10-01

## 2024-10-01 PROBLEM — E86.0 DEHYDRATION DETERMINED BY EXAMINATION: Status: RESOLVED | Noted: 2024-09-01 | Resolved: 2024-10-01

## 2024-10-08 ENCOUNTER — APPOINTMENT (EMERGENCY)
Dept: RADIOLOGY | Facility: HOSPITAL | Age: 84
DRG: 057 | End: 2024-10-08
Payer: MEDICARE

## 2024-10-08 ENCOUNTER — HOSPITAL ENCOUNTER (INPATIENT)
Facility: HOSPITAL | Age: 84
LOS: 3 days | Discharge: HOME | DRG: 057 | End: 2024-10-12
Attending: EMERGENCY MEDICINE | Admitting: INTERNAL MEDICINE
Payer: MEDICARE

## 2024-10-08 DIAGNOSIS — N39.0 UTI (URINARY TRACT INFECTION): ICD-10-CM

## 2024-10-08 DIAGNOSIS — E83.42 HYPOMAGNESEMIA: ICD-10-CM

## 2024-10-08 DIAGNOSIS — E87.20 LACTIC ACIDOSIS: Primary | ICD-10-CM

## 2024-10-08 DIAGNOSIS — R63.0 POOR APPETITE: ICD-10-CM
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
## 2024-10-08 DIAGNOSIS — E87.6 HYPOKALEMIA: ICD-10-CM

## 2024-10-08 PROBLEM — F02.B0 MODERATE ALZHEIMER'S DEMENTIA WITHOUT BEHAVIORAL DISTURBANCE, PSYCHOTIC DISTURBANCE, MOOD DISTURBANCE, OR ANXIETY (HCC): Status: ACTIVE | Noted: 2024-10-08

## 2024-10-08 PROBLEM — E86.0 DEHYDRATION DETERMINED BY EXAMINATION: Status: ACTIVE | Noted: 2024-10-08

## 2024-10-08 PROBLEM — G30.9 MODERATE ALZHEIMER'S DEMENTIA WITHOUT BEHAVIORAL DISTURBANCE, PSYCHOTIC DISTURBANCE, MOOD DISTURBANCE, OR ANXIETY (HCC): Status: ACTIVE | Noted: 2024-10-08

## 2024-10-08 LAB
ALBUMIN SERPL BCG-MCNC: 3.8 G/DL (ref 3.5–5)
ALP SERPL-CCNC: 93 U/L (ref 34–104)
ALT SERPL W P-5'-P-CCNC: 10 U/L (ref 7–52)
ANION GAP SERPL CALCULATED.3IONS-SCNC: 12 MMOL/L (ref 4–13)
AST SERPL W P-5'-P-CCNC: 12 U/L (ref 13–39)
BACTERIA UR QL AUTO: ABNORMAL /HPF
BASOPHILS # BLD AUTO: 0.04 THOUSANDS/ΜL (ref 0–0.1)
BASOPHILS NFR BLD AUTO: 0 % (ref 0–1)
BILIRUB SERPL-MCNC: 0.84 MG/DL (ref 0.2–1)
BILIRUB UR QL STRIP: NEGATIVE
BUN SERPL-MCNC: 35 MG/DL (ref 5–25)
CALCIUM SERPL-MCNC: 9.1 MG/DL (ref 8.4–10.2)
CHLORIDE SERPL-SCNC: 100 MMOL/L (ref 96–108)
CLARITY UR: CLEAR
CO2 SERPL-SCNC: 26 MMOL/L (ref 21–32)
COLOR UR: ABNORMAL
CREAT SERPL-MCNC: 0.86 MG/DL (ref 0.6–1.3)
EOSINOPHIL # BLD AUTO: 0.16 THOUSAND/ΜL (ref 0–0.61)
EOSINOPHIL NFR BLD AUTO: 2 % (ref 0–6)
ERYTHROCYTE [DISTWIDTH] IN BLOOD BY AUTOMATED COUNT: 15.8 % (ref 11.6–15.1)
FLUAV AG UPPER RESP QL IA.RAPID: NEGATIVE
FLUBV AG UPPER RESP QL IA.RAPID: NEGATIVE
GFR SERPL CREATININE-BSD FRML MDRD: 62 ML/MIN/1.73SQ M
GLUCOSE SERPL-MCNC: 194 MG/DL (ref 65–140)
GLUCOSE UR STRIP-MCNC: ABNORMAL MG/DL
HCT VFR BLD AUTO: 43.8 % (ref 34.8–46.1)
HGB BLD-MCNC: 13.7 G/DL (ref 11.5–15.4)
HGB UR QL STRIP.AUTO: NEGATIVE
IMM GRANULOCYTES # BLD AUTO: 0.08 THOUSAND/UL (ref 0–0.2)
IMM GRANULOCYTES NFR BLD AUTO: 1 % (ref 0–2)
KETONES UR STRIP-MCNC: NEGATIVE MG/DL
LACTATE SERPL-SCNC: 3.1 MMOL/L (ref 0.5–2)
LACTATE SERPL-SCNC: 3.4 MMOL/L (ref 0.5–2)
LEUKOCYTE ESTERASE UR QL STRIP: ABNORMAL
LYMPHOCYTES # BLD AUTO: 1.68 THOUSANDS/ΜL (ref 0.6–4.47)
LYMPHOCYTES NFR BLD AUTO: 16 % (ref 14–44)
MAGNESIUM SERPL-MCNC: 1.4 MG/DL (ref 1.9–2.7)
MCH RBC QN AUTO: 27.8 PG (ref 26.8–34.3)
MCHC RBC AUTO-ENTMCNC: 31.3 G/DL (ref 31.4–37.4)
MCV RBC AUTO: 89 FL (ref 82–98)
MONOCYTES # BLD AUTO: 0.85 THOUSAND/ΜL (ref 0.17–1.22)
MONOCYTES NFR BLD AUTO: 8 % (ref 4–12)
NEUTROPHILS # BLD AUTO: 7.89 THOUSANDS/ΜL (ref 1.85–7.62)
NEUTS SEG NFR BLD AUTO: 73 % (ref 43–75)
NITRITE UR QL STRIP: NEGATIVE
NON-SQ EPI CELLS URNS QL MICRO: ABNORMAL /HPF
NRBC BLD AUTO-RTO: 0 /100 WBCS
PH UR STRIP.AUTO: 6.5 [PH]
PLATELET # BLD AUTO: 304 THOUSANDS/UL (ref 149–390)
PMV BLD AUTO: 9.2 FL (ref 8.9–12.7)
POTASSIUM SERPL-SCNC: 4.2 MMOL/L (ref 3.5–5.3)
PROCALCITONIN SERPL-MCNC: 0.11 NG/ML
PROT SERPL-MCNC: 7.3 G/DL (ref 6.4–8.4)
PROT UR STRIP-MCNC: NEGATIVE MG/DL
RBC # BLD AUTO: 4.92 MILLION/UL (ref 3.81–5.12)
RBC #/AREA URNS AUTO: ABNORMAL /HPF
SARS-COV+SARS-COV-2 AG RESP QL IA.RAPID: NEGATIVE
SODIUM SERPL-SCNC: 138 MMOL/L (ref 135–147)
SP GR UR STRIP.AUTO: 1.02 (ref 1–1.03)
UROBILINOGEN UR STRIP-ACNC: <2 MG/DL
WBC # BLD AUTO: 10.7 THOUSAND/UL (ref 4.31–10.16)
WBC #/AREA URNS AUTO: ABNORMAL /HPF

## 2024-10-08 PROCEDURE — 99222 1ST HOSP IP/OBS MODERATE 55: CPT | Performed by: FAMILY MEDICINE

## 2024-10-08 PROCEDURE — 83735 ASSAY OF MAGNESIUM: CPT | Performed by: EMERGENCY MEDICINE

## 2024-10-08 PROCEDURE — 73630 X-RAY EXAM OF FOOT: CPT

## 2024-10-08 PROCEDURE — 99284 EMERGENCY DEPT VISIT MOD MDM: CPT

## 2024-10-08 PROCEDURE — 84145 PROCALCITONIN (PCT): CPT | Performed by: EMERGENCY MEDICINE

## 2024-10-08 PROCEDURE — 85025 COMPLETE CBC W/AUTO DIFF WBC: CPT | Performed by: EMERGENCY MEDICINE

## 2024-10-08 PROCEDURE — 87811 SARS-COV-2 COVID19 W/OPTIC: CPT | Performed by: EMERGENCY MEDICINE

## 2024-10-08 PROCEDURE — 36415 COLL VENOUS BLD VENIPUNCTURE: CPT | Performed by: EMERGENCY MEDICINE

## 2024-10-08 PROCEDURE — 87804 INFLUENZA ASSAY W/OPTIC: CPT | Performed by: EMERGENCY MEDICINE

## 2024-10-08 PROCEDURE — 96361 HYDRATE IV INFUSION ADD-ON: CPT

## 2024-10-08 PROCEDURE — 83605 ASSAY OF LACTIC ACID: CPT | Performed by: EMERGENCY MEDICINE

## 2024-10-08 PROCEDURE — 83036 HEMOGLOBIN GLYCOSYLATED A1C: CPT | Performed by: FAMILY MEDICINE

## 2024-10-08 PROCEDURE — 93005 ELECTROCARDIOGRAM TRACING: CPT

## 2024-10-08 PROCEDURE — 99285 EMERGENCY DEPT VISIT HI MDM: CPT | Performed by: EMERGENCY MEDICINE

## 2024-10-08 PROCEDURE — 96366 THER/PROPH/DIAG IV INF ADDON: CPT

## 2024-10-08 PROCEDURE — 96365 THER/PROPH/DIAG IV INF INIT: CPT

## 2024-10-08 PROCEDURE — 73610 X-RAY EXAM OF ANKLE: CPT

## 2024-10-08 PROCEDURE — 81001 URINALYSIS AUTO W/SCOPE: CPT | Performed by: FAMILY MEDICINE

## 2024-10-08 PROCEDURE — 80053 COMPREHEN METABOLIC PANEL: CPT | Performed by: EMERGENCY MEDICINE

## 2024-10-08 RX ORDER — MAGNESIUM SULFATE HEPTAHYDRATE 40 MG/ML
2 INJECTION, SOLUTION INTRAVENOUS ONCE
Status: COMPLETED | OUTPATIENT
Start: 2024-10-08 | End: 2024-10-08

## 2024-10-08 RX ORDER — ATORVASTATIN CALCIUM 20 MG/1
20 TABLET, FILM COATED ORAL
Status: DISCONTINUED | OUTPATIENT
Start: 2024-10-08 | End: 2024-10-12 | Stop reason: HOSPADM

## 2024-10-08 RX ORDER — INSULIN LISPRO 100 [IU]/ML
1-5 INJECTION, SOLUTION INTRAVENOUS; SUBCUTANEOUS
Status: DISCONTINUED | OUTPATIENT
Start: 2024-10-09 | End: 2024-10-12 | Stop reason: HOSPADM

## 2024-10-08 RX ORDER — LOSARTAN POTASSIUM 25 MG/1
25 TABLET ORAL DAILY
Status: DISCONTINUED | OUTPATIENT
Start: 2024-10-09 | End: 2024-10-12 | Stop reason: HOSPADM

## 2024-10-08 RX ORDER — ENOXAPARIN SODIUM 100 MG/ML
40 INJECTION SUBCUTANEOUS DAILY
Status: DISCONTINUED | OUTPATIENT
Start: 2024-10-09 | End: 2024-10-12 | Stop reason: HOSPADM

## 2024-10-08 RX ORDER — MONTELUKAST SODIUM 4 MG/1
2 TABLET, CHEWABLE ORAL 2 TIMES DAILY
Status: DISCONTINUED | OUTPATIENT
Start: 2024-10-09 | End: 2024-10-12 | Stop reason: HOSPADM

## 2024-10-08 RX ORDER — INSULIN LISPRO 100 [IU]/ML
1-5 INJECTION, SOLUTION INTRAVENOUS; SUBCUTANEOUS
Status: DISCONTINUED | OUTPATIENT
Start: 2024-10-08 | End: 2024-10-12 | Stop reason: HOSPADM

## 2024-10-08 RX ORDER — DONEPEZIL HYDROCHLORIDE 5 MG/1
10 TABLET, FILM COATED ORAL
Status: DISCONTINUED | OUTPATIENT
Start: 2024-10-08 | End: 2024-10-12 | Stop reason: HOSPADM

## 2024-10-08 RX ORDER — SENNOSIDES 8.6 MG
1 TABLET ORAL DAILY
Status: DISCONTINUED | OUTPATIENT
Start: 2024-10-09 | End: 2024-10-12 | Stop reason: HOSPADM

## 2024-10-08 RX ORDER — LANOLIN ALCOHOL/MO/W.PET/CERES
800 CREAM (GRAM) TOPICAL 2 TIMES DAILY
Status: DISCONTINUED | OUTPATIENT
Start: 2024-10-09 | End: 2024-10-12 | Stop reason: HOSPADM

## 2024-10-08 RX ORDER — DICYCLOMINE HCL 20 MG
20 TABLET ORAL EVERY 6 HOURS
Status: DISCONTINUED | OUTPATIENT
Start: 2024-10-08 | End: 2024-10-12 | Stop reason: HOSPADM

## 2024-10-08 RX ORDER — ACETAMINOPHEN 325 MG/1
650 TABLET ORAL EVERY 6 HOURS PRN
Status: DISCONTINUED | OUTPATIENT
Start: 2024-10-08 | End: 2024-10-12 | Stop reason: HOSPADM

## 2024-10-08 RX ORDER — CLOPIDOGREL BISULFATE 75 MG/1
75 TABLET ORAL DAILY
Status: DISCONTINUED | OUTPATIENT
Start: 2024-10-09 | End: 2024-10-12 | Stop reason: HOSPADM

## 2024-10-08 RX ORDER — SODIUM CHLORIDE, SODIUM GLUCONATE, SODIUM ACETATE, POTASSIUM CHLORIDE, MAGNESIUM CHLORIDE, SODIUM PHOSPHATE, DIBASIC, AND POTASSIUM PHOSPHATE .53; .5; .37; .037; .03; .012; .00082 G/100ML; G/100ML; G/100ML; G/100ML; G/100ML; G/100ML; G/100ML
75 INJECTION, SOLUTION INTRAVENOUS CONTINUOUS
Status: DISCONTINUED | OUTPATIENT
Start: 2024-10-08 | End: 2024-10-10

## 2024-10-08 RX ORDER — POLYETHYLENE GLYCOL 3350 17 G/17G
17 POWDER, FOR SOLUTION ORAL DAILY
Status: DISCONTINUED | OUTPATIENT
Start: 2024-10-09 | End: 2024-10-12 | Stop reason: HOSPADM

## 2024-10-08 RX ORDER — CALCIUM CARBONATE 500 MG/1
1000 TABLET, CHEWABLE ORAL DAILY PRN
Status: DISCONTINUED | OUTPATIENT
Start: 2024-10-08 | End: 2024-10-12 | Stop reason: HOSPADM

## 2024-10-08 RX ORDER — DRONABINOL 2.5 MG/1
2.5 CAPSULE ORAL
Status: DISCONTINUED | OUTPATIENT
Start: 2024-10-09 | End: 2024-10-12 | Stop reason: HOSPADM

## 2024-10-08 RX ADMIN — MAGNESIUM SULFATE HEPTAHYDRATE 2 G: 40 INJECTION, SOLUTION INTRAVENOUS at 20:17

## 2024-10-08 RX ADMIN — SODIUM CHLORIDE 500 ML: 0.9 INJECTION, SOLUTION INTRAVENOUS at 22:22

## 2024-10-08 RX ADMIN — SODIUM CHLORIDE 500 ML: 0.9 INJECTION, SOLUTION INTRAVENOUS at 20:17

## 2024-10-08 NOTE — ED PROVIDER NOTES
Final diagnoses:   Lactic acidosis   Fall, initial encounter   Hypomagnesemia   Hypokalemia     ED Disposition       ED Disposition   Admit    Condition   Stable    Date/Time   Tue Oct 8, 2024 10:55 PM    Comment   Case was discussed with Dr. Machado and the patient's admission status was agreed to be Admission Status: observation status.               Assessment & Plan       Medical Decision Making  Patient is an 84-year-old female with past medical history of dementia who presents to the emergency department today in the company of her daughter after a witnessed fall.  Her daughter states that she has been increasingly weak with decreased p.o. intake.  She is currently being evaluated for cholelithiasis but has had no diagnosis of cholecystitis.  She presents today after witnessed trip and fall over her walker.  She is complaining of foot and ankle pain.  Foot is somewhat erythematous/bruised with tenderness to palpation over the dorsum and plantar aspect.  No fracture identified on x-ray.  General screening labs sent due to increasing weakness.  Daughter states her level of function has decreased somewhat.  Recent CT enterography was notable for possible mild cystitis versus proctocolitis.  She has had no significant symptoms to suggest an acute episode, however UTI would potentially explain rising lactate despite fluid resuscitation.  Due to the concern for lactic acidosis of unclear source, additional infectious labs were sent.  Fluid resuscitation continued and patient discussed with internal medicine for admission for further workup.    Did briefly discuss with her daughter goals of care in light of likely progression of dementia. No limitations at this time.     Amount and/or Complexity of Data Reviewed  Independent Historian: caregiver     Details: See narrative  External Data Reviewed: radiology and ECG.     Details: See narrative and EKG  below  Labs: ordered. Decision-making details documented in ED  Course.  Radiology: ordered and independent interpretation performed. Decision-making details documented in ED Course.     Details: No fracture appreciated, believe a high interpretation is detecting overlap  ECG/medicine tests: independent interpretation performed.     Details: Sinus tachycardia at 127, left axis deviation, appears consistent with LVH, inferior Q waves identified similar to prior.  Abnormal EKG no significant change    Risk  Prescription drug management.  Decision regarding hospitalization.        ED Course as of 10/08/24 2323   Tue Oct 08, 2024   2034 Lactic acid, plasma (w/reflex if result > 2.0)(!)   2035 Magnesium(!)   2233 Lactic acid 2 Hours(!)   2233 CT small bowel enterography (9/23/24 1050)  Cystitis potentially noted at that time   2242 Procalcitonin       Medications   sodium chloride 0.9 % bolus 500 mL (0 mL Intravenous Stopped 10/8/24 2136)   magnesium sulfate 2 g/50 mL IVPB (premix) 2 g (0 g Intravenous Stopped 10/8/24 2153)   sodium chloride 0.9 % bolus 500 mL (500 mL Intravenous New Bag 10/8/24 2222)       ED Risk Strat Scores                   Identification of Seniors at Risk      Flowsheet Row Most Recent Value   (ISAR) Identification of Seniors at Risk    Before the illness or injury that brought you to the Emergency, did you need someone to help you on a regular basis? 1 Filed at: 10/08/2024 1838   In the last 24 hours, have you needed more help than usual? 1 Filed at: 10/08/2024 1838   Have you been hospitalized for one or more nights during the past 6 months? 1 Filed at: 10/08/2024 1838   In general, do you see well? 0 Filed at: 10/08/2024 1838   In general, do you have serious problems with your memory? 1 Filed at: 10/08/2024 1838   Do you take more than three different medications every day? 1 Filed at: 10/08/2024 1838   ISAR Score 5 Filed at: 10/08/2024 1838                SBIRT 20yo+      Flowsheet Row Most Recent Value   Initial Alcohol Screen: US AUDIT-C     1. How  often do you have a drink containing alcohol? 0 Filed at: 10/08/2024 1838   2. How many drinks containing alcohol do you have on a typical day you are drinking?  0 Filed at: 10/08/2024 1838   3a. Male UNDER 65: How often do you have five or more drinks on one occasion? 0 Filed at: 10/08/2024 1838   3b. FEMALE Any Age, or MALE 65+: How often do you have 4 or more drinks on one occassion? 0 Filed at: 10/08/2024 1838   Audit-C Score 0 Filed at: 10/08/2024 1838   DIVYA: How many times in the past year have you...    Used an illegal drug or used a prescription medication for non-medical reasons? Never Filed at: 10/08/2024 1838                            History of Present Illness       Chief Complaint   Patient presents with    Fall     Daughter reports fall at 2300 last night, no head strike, had no complaints then and was up walking. Daughter noticed tonight her R foot swelled up. Daughter concerned for abd pains because she was leaning on walker holding stomach when she fell. Denied pain and denies complaints currently however when touching foot pt yells. Pt with dementia.        Past Medical History:   Diagnosis Date    Cancer (HCC)     cervical 10 years ago- chemo radiation    Diabetes mellitus (HCC)     Diverticulitis     [place] (hard of hearing)     Hypertension       Past Surgical History:   Procedure Laterality Date    CATARACT EXTRACTION      ELBOW SURGERY Right     HYSTERECTOMY      partial    TOE SURGERY      Daughter unsure side      History reviewed. No pertinent family history.   Social History     Tobacco Use    Smoking status: Never    Smokeless tobacco: Never   Vaping Use    Vaping status: Never Used   Substance Use Topics    Alcohol use: Never    Drug use: Never      E-Cigarette/Vaping    E-Cigarette Use Never User       E-Cigarette/Vaping Substances    Nicotine No     THC No     CBD No     Flavoring No     Other No     Unknown No       I have reviewed and agree with the history as documented.     Is an  84-year-old female who presents with right foot pain after a fall      History provided by:  Caregiver  History limited by:  Dementia      Review of Systems   Unable to perform ROS: Dementia           Objective       ED Triage Vitals [10/08/24 1835]   Temperature Pulse Blood Pressure Respirations SpO2 Patient Position - Orthostatic VS   97.7 °F (36.5 °C) (!) 124 127/66 19 98 % Sitting      Temp Source Heart Rate Source BP Location FiO2 (%) Pain Score    Oral Monitor Left arm -- --      Vitals      Date and Time Temp Pulse SpO2 Resp BP Pain Score FACES Pain Rating User   10/08/24 2200 -- 90 96 % 20 132/72 -- -- [facility]   10/08/24 2100 -- 91 99 % 18 129/58 -- -- [facility]   10/08/24 2000 -- 106 96 % 14 139/70 -- -- [facility]   10/08/24 1900 -- 110 96 % 21 133/67 -- -- [facility]   10/08/24 1835 97.7 °F (36.5 °C) 124 98 % 19 127/66 -- -- AB            Physical Exam  Vitals and nursing note reviewed.   Constitutional:       General: She is not in acute distress.     Appearance: Normal appearance.   HENT:      Head: Normocephalic and atraumatic.      Right Ear: External ear normal.      Left Ear: External ear normal.      Nose: Nose normal.      Mouth/Throat:      Mouth: Mucous membranes are dry.   Cardiovascular:      Rate and Rhythm: Regular rhythm. Tachycardia present.   Pulmonary:      Effort: Pulmonary effort is normal.      Breath sounds: Normal breath sounds.   Abdominal:      General: There is no distension.      Palpations: Abdomen is soft.      Tenderness: There is no abdominal tenderness.   Musculoskeletal:         General: Swelling, tenderness and signs of injury present.      Right lower leg: No edema.      Left lower leg: No edema.      Comments: R foot TTP w/ mild edema, erythema, and ecchymosis   Skin:     General: Skin is warm and dry.   Neurological:      Mental Status: She is alert. Mental status is at baseline.   Psychiatric:         Behavior: Behavior normal.         Results Reviewed       Procedure Component Value Units  Date/Time    FLU/COVID Rapid Antigen (30 min. TAT) - Preferred screening test in ED [[id number]]  (Normal) Collected: 10/08/24 2222    Lab Status: Final result Specimen: Nares from Nose Updated: 10/08/24 2243     SARS COV Rapid Antigen Negative     Influenza A Rapid Antigen Negative     Influenza B Rapid Antigen Negative    Narrative:      This test has been performed using the [employer] Ora 2 FLU+SARS Antigen test under the Emergency Use Authorization (EUA). This test has been validated by the  and verified by the performing laboratory. The Ora uses lateral flow immunofluorescent sandwich assay to detect SARS-COV, Influenza A and Influenza B Antigen.     The [employer]idel Ora 2 SARS Antigen test does not differentiate between SARS-CoV and SARS-CoV-2.     Negative results are presumptive and may be confirmed with a molecular assay, if necessary, for patient management. Negative results do not rule out SARS-CoV-2 or influenza infection and should not be used as the sole basis for treatment or patient management decisions. A negative test result may occur if the level of antigen in a sample is below the limit of detection of this test.     Positive results are indicative of the presence of viral antigens, but do not rule out bacterial infection or co-infection with other viruses.     All test results should be used as an adjunct to clinical observations and other information available to the provider.    FOR PEDIATRIC PATIENTS - copy/paste COVID Guidelines URL to browser: [email or web address]    Procalcitonin [[id number]]  (Normal) Collected: 10/08/24 1913    Lab Status: Final result Specimen: Blood from Arm, Left Updated: 10/08/24 2240     Procalcitonin 0.11 ng/ml     UA (URINE) with reflex to Scope [[id number]]     Lab Status: No result Specimen: Urine     Lactic acid 2 Hours [[id number]]  (Abnormal) Collected: 10/08/24 2138    Lab Status: Final result  Specimen: Blood from Arm, Left Updated: 10/08/24 2200     LACTIC ACID 3.4 mmol/L     Narrative:      Result may be elevated if tourniquet was used during collection.    Lactic acid, plasma (w/reflex if result > 2.0) [[id number]]  (Abnormal) Collected: 10/08/24 1913    Lab Status: Final result Specimen: Blood from Arm, Left Updated: 10/08/24 1950     LACTIC ACID 3.1 mmol/L     Narrative:      Result may be elevated if tourniquet was used during collection.    Comprehensive metabolic panel [[id number]]  (Abnormal) Collected: 10/08/24 1913    Lab Status: Final result Specimen: Blood from Arm, Left Updated: 10/08/24 1933     Sodium 138 mmol/L      Potassium 4.2 mmol/L      Chloride 100 mmol/L      CO2 26 mmol/L      ANION GAP 12 mmol/L      BUN 35 mg/dL      Creatinine 0.86 mg/dL      Glucose 194 mg/dL      Calcium 9.1 mg/dL      AST 12 U/L      ALT 10 U/L      Alkaline Phosphatase 93 U/L      Total Protein 7.3 g/dL      Albumin 3.8 g/dL      Total Bilirubin 0.84 mg/dL      eGFR 62 ml/min/1.73sq m     Narrative:      National Kidney Disease Foundation guidelines for Chronic Kidney Disease (CKD):     Stage 1 with normal or high GFR (GFR > 90 mL/min/1.73 square meters)    Stage 2 Mild CKD (GFR = 60-89 mL/min/1.73 square meters)    Stage 3A Moderate CKD (GFR = 45-59 mL/min/1.73 square meters)    Stage 3B Moderate CKD (GFR = 30-44 mL/min/1.73 square meters)    Stage 4 Severe CKD (GFR = 15-29 mL/min/1.73 square meters)    Stage 5 End Stage CKD (GFR <15 mL/min/1.73 square meters)  Note: GFR calculation is accurate only with a steady state creatinine    Magnesium [[id number]]  (Abnormal) Collected: 10/08/24 1913    Lab Status: Final result Specimen: Blood from Arm, Left Updated: 10/08/24 1933     Magnesium 1.4 mg/dL     CBC and differential [[id number]]  (Abnormal) Collected: 10/08/24 1913    Lab Status: Final result Specimen: Blood from Arm, Left Updated: 10/08/24 1919     WBC 10.70 Thousand/uL      RBC 4.92 Million/uL       Hemoglobin 13.7 g/dL      Hematocrit 43.8 %      MCV 89 fL      MCH 27.8 pg      MCHC 31.3 g/dL      RDW 15.8 %      MPV 9.2 fL      Platelets 304 Thousands/uL      nRBC 0 /100 WBCs      Segmented % 73 %      Immature Grans % 1 %      Lymphocytes % 16 %      Monocytes % 8 %      Eosinophils Relative 2 %      Basophils Relative 0 %      Absolute Neutrophils 7.89 Thousands/µL      Absolute Immature Grans 0.08 Thousand/uL      Absolute Lymphocytes 1.68 Thousands/µL      Absolute Monocytes 0.85 Thousand/µL      Eosinophils Absolute 0.16 Thousand/µL      Basophils Absolute 0.04 Thousands/µL             XR foot 3+ views RIGHT    (Results Pending)   XR ankle 3+ vw right    (Results Pending)       Procedures    ED Medication and Procedure Management   Prior to Admission Medications   Prescriptions Last Dose Informant Patient Reported? Taking?   Empagliflozin (Jardiance) 25 MG TABS  Self, Child Yes No   Sig: Take 25 mg by mouth every evening   PSYLLIUM HUSK PO  Self, Child Yes No   Sig: Take by mouth   atorvastatin (LIPITOR) 20 mg tablet  Self, Child Yes No   Sig: Take 20 mg by mouth daily at bedtime   clopidogrel (PLAVIX) 75 mg tablet   Yes No   Sig: Take 75 mg by mouth daily   colestipol (COLESTID) 1 g tablet   No No   Sig: Take 2 tablets (2 g total) by mouth 2 (two) times a day   dicyclomine (BENTYL) 20 mg tablet   No No   Sig: Take 1 tablet (20 mg total) by mouth every 6 (six) hours   donepezil (ARICEPT) 10 mg tablet  Self, Child Yes No   Sig: take 1 tablet by mouth every day at night   dronabinol (MARINOL) 2.5 mg capsule   No No   Sig: Take 1 capsule (2.5 mg total) by mouth 2 (two) times a day before meals   losartan (COZAAR) 25 mg tablet  Self, Child Yes No   Sig: Take 25 mg by mouth daily After dinner   magnesium Oxide (MAG-OX) 400 mg TABS  Self, Child No No   Sig: Take 2 tablets (800 mg total) by mouth 2 (two) times a day   potassium chloride (MICRO-K) 10 MEQ CR capsule  Self, Child No No   Sig: Take 2 capsules (20  mEq total) by mouth 2 (two) times a day      Facility-Administered Medications: None     Patient's Medications   Discharge Prescriptions    No medications on file     No discharge procedures on file.  ED SEPSIS DOCUMENTATION   Time reflects when diagnosis was documented in both MDM as applicable and the Disposition within this note       Time User Action Codes Description Comment    10/8/2024 10:55 PM Becca Donis [E87.20] Lactic acidosis     10/8/2024 10:55 PM Becca Donis [W19.XXXA] Fall, initial encounter     10/8/2024 10:56 PM Becca Donis [E83.42] Hypomagnesemia     10/8/2024 10:56 PM Becca Donis [E87.6] Hypokalemia                  Becca Donis MD  10/08/24 6924

## 2024-10-09 LAB
ALBUMIN SERPL BCG-MCNC: 3.3 G/DL (ref 3.5–5)
ALP SERPL-CCNC: 79 U/L (ref 34–104)
ALT SERPL W P-5'-P-CCNC: 10 U/L (ref 7–52)
ANION GAP SERPL CALCULATED.3IONS-SCNC: 6 MMOL/L (ref 4–13)
AST SERPL W P-5'-P-CCNC: 10 U/L (ref 13–39)
ATRIAL RATE: 127 BPM
BILIRUB SERPL-MCNC: 0.82 MG/DL (ref 0.2–1)
BUN SERPL-MCNC: 29 MG/DL (ref 5–25)
CALCIUM ALBUM COR SERPL-MCNC: 8.6 MG/DL (ref 8.3–10.1)
CALCIUM SERPL-MCNC: 8 MG/DL (ref 8.4–10.2)
CHLORIDE SERPL-SCNC: 106 MMOL/L (ref 96–108)
CO2 SERPL-SCNC: 26 MMOL/L (ref 21–32)
CREAT SERPL-MCNC: 0.62 MG/DL (ref 0.6–1.3)
ERYTHROCYTE [DISTWIDTH] IN BLOOD BY AUTOMATED COUNT: 15.7 % (ref 11.6–15.1)
EST. AVERAGE GLUCOSE BLD GHB EST-MCNC: 126 MG/DL
GFR SERPL CREATININE-BSD FRML MDRD: 83 ML/MIN/1.73SQ M
GLUCOSE P FAST SERPL-MCNC: 104 MG/DL (ref 65–99)
GLUCOSE SERPL-MCNC: 100 MG/DL (ref 65–140)
GLUCOSE SERPL-MCNC: 102 MG/DL (ref 65–140)
GLUCOSE SERPL-MCNC: 104 MG/DL (ref 65–140)
GLUCOSE SERPL-MCNC: 110 MG/DL (ref 65–140)
GLUCOSE SERPL-MCNC: 110 MG/DL (ref 65–140)
HBA1C MFR BLD: 6 %
HCT VFR BLD AUTO: 36.5 % (ref 34.8–46.1)
HGB BLD-MCNC: 11.7 G/DL (ref 11.5–15.4)
MAGNESIUM SERPL-MCNC: 2 MG/DL (ref 1.9–2.7)
MCH RBC QN AUTO: 28.4 PG (ref 26.8–34.3)
MCHC RBC AUTO-ENTMCNC: 32.1 G/DL (ref 31.4–37.4)
MCV RBC AUTO: 89 FL (ref 82–98)
P AXIS: 33 DEGREES
PHOSPHATE SERPL-MCNC: 3.6 MG/DL (ref 2.3–4.1)
PLATELET # BLD AUTO: 235 THOUSANDS/UL (ref 149–390)
PMV BLD AUTO: 9.1 FL (ref 8.9–12.7)
POTASSIUM SERPL-SCNC: 4.2 MMOL/L (ref 3.5–5.3)
PR INTERVAL: 148 MS
PROT SERPL-MCNC: 5.9 G/DL (ref 6.4–8.4)
QRS AXIS: -33 DEGREES
QRSD INTERVAL: 78 MS
QT INTERVAL: 302 MS
QTC INTERVAL: 438 MS
RBC # BLD AUTO: 4.12 MILLION/UL (ref 3.81–5.12)
SODIUM SERPL-SCNC: 138 MMOL/L (ref 135–147)
T WAVE AXIS: 69 DEGREES
VENTRICULAR RATE: 127 BPM
WBC # BLD AUTO: 8.53 THOUSAND/UL (ref 4.31–10.16)

## 2024-10-09 PROCEDURE — 82948 REAGENT STRIP/BLOOD GLUCOSE: CPT

## 2024-10-09 PROCEDURE — 84100 ASSAY OF PHOSPHORUS: CPT | Performed by: FAMILY MEDICINE

## 2024-10-09 PROCEDURE — 93010 ELECTROCARDIOGRAM REPORT: CPT | Performed by: INTERNAL MEDICINE

## 2024-10-09 PROCEDURE — 83735 ASSAY OF MAGNESIUM: CPT | Performed by: FAMILY MEDICINE

## 2024-10-09 PROCEDURE — 97167 OT EVAL HIGH COMPLEX 60 MIN: CPT

## 2024-10-09 PROCEDURE — 80053 COMPREHEN METABOLIC PANEL: CPT | Performed by: FAMILY MEDICINE

## 2024-10-09 PROCEDURE — 99233 SBSQ HOSP IP/OBS HIGH 50: CPT | Performed by: INTERNAL MEDICINE

## 2024-10-09 PROCEDURE — 97163 PT EVAL HIGH COMPLEX 45 MIN: CPT

## 2024-10-09 PROCEDURE — 85027 COMPLETE CBC AUTOMATED: CPT | Performed by: FAMILY MEDICINE

## 2024-10-09 RX ADMIN — DRONABINOL 2.5 MG: 2.5 CAPSULE ORAL at 16:29

## 2024-10-09 RX ADMIN — DICYCLOMINE HYDROCHLORIDE 20 MG: 20 TABLET ORAL at 00:05

## 2024-10-09 RX ADMIN — DRONABINOL 2.5 MG: 2.5 CAPSULE ORAL at 08:09

## 2024-10-09 RX ADMIN — Medication 800 MG: at 08:09

## 2024-10-09 RX ADMIN — DICYCLOMINE HYDROCHLORIDE 20 MG: 20 TABLET ORAL at 05:24

## 2024-10-09 RX ADMIN — ATORVASTATIN CALCIUM 20 MG: 20 TABLET, FILM COATED ORAL at 00:05

## 2024-10-09 RX ADMIN — ENOXAPARIN SODIUM 40 MG: 40 INJECTION SUBCUTANEOUS at 08:10

## 2024-10-09 RX ADMIN — DONEPEZIL HYDROCHLORIDE 10 MG: 5 TABLET ORAL at 23:03

## 2024-10-09 RX ADMIN — DONEPEZIL HYDROCHLORIDE 10 MG: 5 TABLET ORAL at 00:05

## 2024-10-09 RX ADMIN — SENNOSIDES 8.6 MG: 8.6 TABLET, FILM COATED ORAL at 08:10

## 2024-10-09 RX ADMIN — CEFTRIAXONE 1000 MG: 1 INJECTION, POWDER, FOR SOLUTION INTRAMUSCULAR; INTRAVENOUS at 19:44

## 2024-10-09 RX ADMIN — SODIUM CHLORIDE, SODIUM GLUCONATE, SODIUM ACETATE, POTASSIUM CHLORIDE, MAGNESIUM CHLORIDE, SODIUM PHOSPHATE, DIBASIC, AND POTASSIUM PHOSPHATE 75 ML/HR: .53; .5; .37; .037; .03; .012; .00082 INJECTION, SOLUTION INTRAVENOUS at 01:11

## 2024-10-09 RX ADMIN — ATORVASTATIN CALCIUM 20 MG: 20 TABLET, FILM COATED ORAL at 23:03

## 2024-10-09 RX ADMIN — POLYETHYLENE GLYCOL 3350 17 G: 17 POWDER, FOR SOLUTION ORAL at 08:10

## 2024-10-09 RX ADMIN — SODIUM CHLORIDE, SODIUM GLUCONATE, SODIUM ACETATE, POTASSIUM CHLORIDE, MAGNESIUM CHLORIDE, SODIUM PHOSPHATE, DIBASIC, AND POTASSIUM PHOSPHATE 75 ML/HR: .53; .5; .37; .037; .03; .012; .00082 INJECTION, SOLUTION INTRAVENOUS at 19:47

## 2024-10-09 RX ADMIN — DICYCLOMINE HYDROCHLORIDE 20 MG: 20 TABLET ORAL at 23:03

## 2024-10-09 RX ADMIN — CLOPIDOGREL 75 MG: 75 TABLET ORAL at 08:10

## 2024-10-09 RX ADMIN — LOSARTAN POTASSIUM 25 MG: 25 TABLET, FILM COATED ORAL at 08:10

## 2024-10-09 NOTE — ASSESSMENT & PLAN NOTE
"Lab Results   Component Value Date    HGBA1C 6.6 (H) 06/16/2024       No results for input(s): \"POCGLU\" in the last 72 hours.    Blood Sugar Average: Last 72 hrs:    Most recent A1c 6.6 greater than 3 months ago-recheck  Without evidence of hyper or hypoglycemia upon presentation  Hold home medication regimen-empagliflozin 25 mg p.o. daily-proceed with sliding scale insulin coverage  "

## 2024-10-09 NOTE — ASSESSMENT & PLAN NOTE
Chronic condition/stable  Continue home medication regimen-Aricept  Fall precautions  Delirium precautions

## 2024-10-09 NOTE — PLAN OF CARE
Problem: PHYSICAL THERAPY ADULT  Goal: Performs mobility at highest level of function for planned discharge setting.  See evaluation for individualized goals.  Description: Treatment/Interventions: Functional transfer training, LE strengthening/ROM, Therapeutic exercise, Endurance training, Patient/family training, Equipment eval/education, Bed mobility, Gait training, Spoke to nursing  Equipment Recommended: Walker (RW)       See flowsheet documentation for full assessment, interventions and recommendations.  10/9/2024 0957 by Kanwal Ramos PT  Note: Prognosis: Good  Problem List: Decreased strength, Decreased endurance, Impaired balance, Decreased mobility, Impaired hearing, Decreased cognition  Assessment: Pt is 84 y.o. female seen for PT evaluation on 10/9/2024 s/p admit to [facility] on 10/8/2024 w/ Dehydration determined by examination. PT was consulted to assess pt's functional mobility and d/c needs. Order placed for PT eval and tx. PTA, pt resides with daughter in 1SH, ambulatory without AD. At time of eval, pt requiring min assist for bed mobility, transfers, household distance gait trial with use of RW. Upon evaluation, pt presenting with impaired functional mobility d/t decreased strength, decreased endurance, impaired balance, decreased mobility, decreased cognition, impaired hearing, and activity intolerance. Pertinent PMHx and current co-morbidities affecting pt's physical performance at time of assessment include: dehydration, HTN, type 2 DM, Alzheimer's dementia, fall, [place], stricture of sigmoid colon. Personal factors affecting pt at time of eval include: ambulating w/ assistive device, decreased cognition, positive fall history, and hearing impairments. The following objective measures performed on IE also reveal limitations: Barthel Index: 50/100, Modified [place]: 4 (moderate/severe disability), and AM-PAC 6-Clicks: 17/24. Pt's clinical presentation is currently unstable/unpredictable  seen in pt's presentation of advanced age, abnormal lab value(s), and ongoing medical assessment. Overall, pt's rehab potential and prognosis to return to PLOF is good as impacted by objective findings, warranting pt to receive further skilled PT interventions to address identified impairments, activity limitation(s), and participation restriction(s). Pt to benefit from continued PT tx to address deficits as defined above and maximize level of functional independent mobility. From PT/mobility standpoint, recommend level 2, moderate resource intensity in order to facilitate return to PLOF.  Barriers to Discharge: Inaccessible home environment, Decreased caregiver support     Rehab Resource Intensity Level, PT: II (Moderate Resource Intensity)    See flowsheet documentation for full assessment.     10/9/2024 0957 by Kanwal Ramos PT  Note: Prognosis: Good  Problem List: Decreased strength, Decreased endurance, Impaired balance, Decreased mobility, Impaired hearing, Decreased cognition  Assessment: Pt is 84 y.o. female seen for PT evaluation on 10/9/2024 s/p admit to [facility] on 10/8/2024 w/ Dehydration determined by examination. PT was consulted to assess pt's functional mobility and d/c needs. Order placed for PT eval and tx. PTA, pt resides with daughter in 1SH, ambulatory without AD. At time of eval, pt requiring min assist for bed mobility, transfers, household distance gait trial with use of RW. Upon evaluation, pt presenting with impaired functional mobility d/t decreased strength, decreased endurance, impaired balance, decreased mobility, decreased cognition, impaired hearing, and activity intolerance. Pertinent PMHx and current co-morbidities affecting pt's physical performance at time of assessment include: dehydration, HTN, type 2 DM, Alzheimer's dementia, fall, [place], stricture of sigmoid colon. Personal factors affecting pt at time of eval include: ambulating w/ assistive device, decreased  cognition, positive fall history, and hearing impairments. The following objective measures performed on IE also reveal limitations: Barthel Index: 50/100, Modified [place]: 4 (moderate/severe disability), and AM-PAC 6-Clicks: 17/24. Pt's clinical presentation is currently unstable/unpredictable seen in pt's presentation of advanced age, abnormal lab value(s), and ongoing medical assessment. Overall, pt's rehab potential and prognosis to return to PLOF is good as impacted by objective findings, warranting pt to receive further skilled PT interventions to address identified impairments, activity limitation(s), and participation restriction(s). Pt to benefit from continued PT tx to address deficits as defined above and maximize level of functional independent mobility. From PT/mobility standpoint, recommend level 2, moderate resource intensity in order to facilitate return to PLOF.  Barriers to Discharge: Inaccessible home environment, Decreased caregiver support     Rehab Resource Intensity Level, PT: II (Moderate Resource Intensity)    See flowsheet documentation for full assessment.

## 2024-10-09 NOTE — PLAN OF CARE
Problem: OCCUPATIONAL THERAPY ADULT  Goal: Performs self-care activities at highest level of function for planned discharge setting.  See evaluation for individualized goals.  Description: Treatment Interventions: ADL retraining, Functional transfer training, UE strengthening/ROM, Endurance training, Cognitive reorientation, Patient/family training, Compensatory technique education, Activityengagement          See flowsheet documentation for full assessment, interventions and recommendations.   Note: Limitation: Decreased ADL status, Decreased UE strength, Decreased Safe judgement during ADL, Decreased cognition, Decreased endurance, Decreased self-care trans, Decreased high-level ADLs  Prognosis: Good  Assessment: Patient is a 84 y.o. female seen for OT evaluation s/p admit to [facility]  on 10/8/2024 w/Dehydration determined by examination. Commorbidities affecting patient's functional performance at time of assessment include: HTN, DM2, dementia, and fall. Orders placed for OT evaluation and treatment and out of bed to chair. Prior to admission, Patient reporting being independent with ADLs, ambulatory with no AD, and lives with daughter. Personal factors affecting patient at time of initial evaluation include: limited insight into deficits, difficulty performing ADLs, and difficulty performing IADLs. Upon evaluation, patient requires supervision and minimal  assist for UB ADLs, moderate assist for LB ADLs, transfers with minimal  assist, with the use of Rolling Walker.  Patient is oriented to name,, oriented to place,, disoriented to time,, and disoriented to situation, and presents with limited ability to recall information after a brief period of time (short term memory) / working memory .  Occupational performance is affected by the following deficits: orientation, decreased muscle strength, dynamic sit/ stand balance deficit with poor standing tolerance time for self care and functional  mobility, decreased activity tolerance, impaired memory, impaired problem solving, and delayed righting and equilibrium reactions. Patient to benefit from continued Occupational Therapy treatment while in the hospital to address deficits as defined above and maximize level of functional independence with ADLs and functional mobility. Occupational Performance areas to address include: grooming , bathing/ shower, dressing, toilet hygiene, transfer to all surfaces, and functional ambulation. From OT standpoint, recommendation at time of d/c would be Level II (moderate resource intensity).     Rehab Resource Intensity Level, OT: II (Moderate Resource Intensity)        Humera Sullivan OTCESAR, OTR/L

## 2024-10-09 NOTE — CASE MANAGEMENT
Case Management Assessment & Discharge Planning Note    Patient name Violet Dick  Location /-01 MRN [id number]  : 1940 Date 10/9/2024       Current Admission Date: 10/8/2024  Current Admission Diagnosis:Dehydration determined by examination   Patient Active Problem List    Diagnosis Date Noted Date Diagnosed    Dehydration determined by examination 10/08/2024     Moderate Alzheimer's dementia without behavioral disturbance, psychotic disturbance, mood disturbance, or anxiety (HCC) 10/08/2024     Fall from standing 10/08/2024     Stricture of sigmoid colon (HCC) 2024     History of hysterectomy 2024     Hard of hearing 2024     HTN (hypertension) 2024     Diabetes mellitus, type 2 (HCC) 2024     Acute diverticulitis 2024       LOS (days): 0  Geometric Mean LOS (GMLOS) (days):   Days to GMLOS:     OBJECTIVE:              Current admission status: Inpatient       Preferred Pharmacy:   [id number] [place] - [address]  [address]  [facility] [address]  Phone: [phone] Fax: [phone]    Primary Care Provider: Tono Gore    Primary Insurance: MEDICARE  Secondary Insurance: [place] [employer] AND [employer] [facility]    ASSESSMENT:  Active Health Care Proxies       Marsha Short Health Care Representative - Daughter   Primary Phone: [phone] (Mobile)                 Advance Directives  Does patient have a Health Care POA?: No  Was patient offered paperwork?: Yes (Patient demonstrating signs of advancing dementia. Unable to sign POA documentation.)  Does patient have Advance Directives?: No  Was patient offered paperwork?: Yes (Patient demonstrating signs of advancing dementia. Unable to sign POA documentation.)  Primary Contact: Marsha Short, Alphonse         Readmission Root Cause  30 Day Readmission: No    Patient Information  Admitted from:: Home  Mental Status: Confused  During Assessment  patient was accompanied by: Not accompanied during assessment  Assessment information provided by:: Daughter  Primary Caregiver: Family  Caregiver's Relationship to Patient:: Family Member  Support Systems: Daughter, Family members  County of Residence: [place]  What city do you live in?: [place]  Home entry access options. Select all that apply.: Stairs  Number of steps to enter home.: 6  Do the steps have railings?: Yes  Type of Current Residence: [address] home  Upon entering residence, is there a bedroom on the main floor (no further steps)?: Yes  Upon entering residence, is there a bathroom on the main floor (no further steps)?: Yes  Is patient a ?: No    Activities of Daily Living Prior to Admission  Functional Status: Assistance  Completes ADLs independently?: No  Level of ADL dependence: Assistance  Ambulates independently?: No  Level of ambulatory dependence: Assistance  Does patient use assisted devices?: Yes  Assisted Devices (DME) used: Shower Chair, Walker  Does patient currently own DME?: Yes  What DME does the patient currently own?: Walker, Shower Chair  Does patient have a history of Outpatient Therapy (PT/OT)?: No  Does the patient have a history of Short-Term Rehab?: No  Does patient have a history of HHC?: No  Does patient currently have HHC?: No         Patient Information Continued  Income Source: SSI/SSD  Does patient have prescription coverage?: Yes  Does patient receive dialysis treatments?: No  Does patient have a history of substance abuse?: No  Does patient have a history of Mental Health Diagnosis?: No         Means of Transportation  Means of Transport to Appts:: Family transport      Social Determinants of Health (SDOH)      Flowsheet Row Most Recent Value   Housing Stability    In the last 12 months, was there a time when you were not able to pay the mortgage or rent on time? N   In the past 12 months, how many times have you moved where you were living? 1   At any time in the  past 12 months, were you homeless or living in a shelter (including now)? N   Transportation Needs    In the past 12 months, has lack of transportation kept you from medical appointments or from getting medications? no   In the past 12 months, has lack of transportation kept you from meetings, work, or from getting things needed for daily living? No   Food Insecurity    Within the past 12 months, you worried that your food would run out before you got the money to buy more. Never true   Within the past 12 months, the food you bought just didn't last and you didn't have money to get more. Never true   Utilities    In the past 12 months has the electric, gas, oil, or water company threatened to shut off services in your home? No            DISCHARGE DETAILS:    Discharge planning discussed with:: Marsha Short, Daughter  Freedom of Choice: Yes  Comments - Freedom of Choice: CM discussed discharge planning and freedom of choice if services are recommended by [facility].  CM contacted family/caregiver?: Yes  Were Treatment Team discharge recommendations reviewed with patient/caregiver?: Yes  Did patient/caregiver verbalize understanding of patient care needs?: Yes  Were patient/caregiver advised of the risks associated with not following Treatment Team discharge recommendations?: Yes    Contacts  Patient Contacts: Marsha (Daughter)  [phone]  Relationship to Patient:: Family  Contact Method: Phone  Phone Number: [phone]  Reason/Outcome: Continuity of Care, Emergency Contact, Discharge Planning    Requested Home Health Care         Is the patient interested in HHC at discharge?: No    DME Referral Provided  Referral made for DME?: No    Other Referral/Resources/Interventions Provided:  Interventions: None Indicated    Would you like to participate in our [facility] service program?  : No - Declined    Treatment Team Recommendation: Home  Discharge Destination Plan:: Home

## 2024-10-09 NOTE — ASSESSMENT & PLAN NOTE
Lab Results   Component Value Date    HGBA1C 6.0 (H) 10/08/2024       Recent Labs     10/09/24  0641 10/09/24  1037   POCGLU 102 100       Blood Sugar Average: Last 72 hrs:  (P) 101  Most recent A1c 6.6 greater than 3 months ago-recheck  Without evidence of hyper or hypoglycemia  Hold home medication regimen-empagliflozin 25 mg p.o. daily-proceed with sliding scale insulin coverage

## 2024-10-09 NOTE — ASSESSMENT & PLAN NOTE
"Patient presents this evening (10/8) accompanied by her daughter who is her caregiver with reports of decreased intake of liquids and solids as well as generalized weakness and ambulatory dysfunction over the past several days  In addition, patient's daughter notes patient had a mechanical fall last evening, did not strike head, secondary to \"weakness\"  Patient with underlying dementia and baseline ambulatory dysfunction-generally uses a walker  Patient without reports of unilateral weakness/numbness, facial droop, loss of consciousness    In ED, patient hemodynamically stable without oxygen requirement.  And absence of SIRS criteria.  Isolated lactic acidosis  Index suspicion for overall hypovolemia contributing to weakness requesting request for observation    Plan:  Observe under hospitalist service  Maintenance fluids over 12 to 24 hours  Lactic acidosis in absence of SIRS criteria likely the result of underlying volume depletion/dehydration  Monitor for evolution of SIRS criteria  Low index of suspicion for infection-however, UA pending  PT OT evaluation  Electrolyte replacement as appropriate  "

## 2024-10-09 NOTE — ASSESSMENT & PLAN NOTE
Patient with fall from standing  Without evidence of acute bony abnormality on presentation  Reported R foot pain  Foot and ankle XR negative for acute osseous abnormality  PT recommendation  Walker

## 2024-10-09 NOTE — PLAN OF CARE
Problem: METABOLIC, FLUID AND ELECTROLYTES - ADULT  Goal: Electrolytes maintained within normal limits  Description: INTERVENTIONS:  - Monitor labs and assess patient for signs and symptoms of electrolyte imbalances  - Administer electrolyte replacement as ordered  - Monitor response to electrolyte replacements, including repeat lab results as appropriate  - Instruct patient on fluid and nutrition as appropriate  Outcome: Progressing  Goal: Fluid balance maintained  Description: INTERVENTIONS:  - Monitor labs   - Monitor I/O and WT  - Instruct patient on fluid and nutrition as appropriate  - Assess for signs & symptoms of volume excess or deficit  Outcome: Progressing  Goal: Glucose maintained within target range  Description: INTERVENTIONS:  - Monitor Blood Glucose as ordered  - Assess for signs and symptoms of hyperglycemia and hypoglycemia  - Administer ordered medications to maintain glucose within target range  - Assess nutritional intake and initiate nutrition service referral as needed  Outcome: Progressing     Problem: MUSCULOSKELETAL - ADULT  Goal: Maintain or return mobility to safest level of function  Description: INTERVENTIONS:  - Assess patient's ability to carry out ADLs; assess patient's baseline for ADL function and identify physical deficits which impact ability to perform ADLs (bathing, care of mouth/teeth, toileting, grooming, dressing, etc.)  - Assess/evaluate cause of self-care deficits   - Assess range of motion  - Assess patient's mobility  - Assess patient's need for assistive devices and provide as appropriate  - Encourage maximum independence but intervene and supervise when necessary  - Involve family in performance of ADLs  - Assess for home care needs following discharge   - Consider OT consult to assist with ADL evaluation and planning for discharge  - Provide patient education as appropriate  Outcome: Progressing  Goal: Maintain proper alignment of affected body part  Description:  INTERVENTIONS:  - Support, maintain and protect limb and body alignment  - Provide patient/ family with appropriate education  Outcome: Progressing     Problem: PAIN - ADULT  Goal: Verbalizes/displays adequate comfort level or baseline comfort level  Description: Interventions:  - Encourage patient to monitor pain and request assistance  - Assess pain using appropriate pain scale  - Administer analgesics based on type and severity of pain and evaluate response  - Implement non-pharmacological measures as appropriate and evaluate response  - Consider cultural and social influences on pain and pain management  - Notify physician/advanced practitioner if interventions unsuccessful or patient reports new pain  Outcome: Progressing     Problem: SAFETY ADULT  Goal: Patient will remain free of falls  Description: INTERVENTIONS:  - Educate patient/family on patient safety including physical limitations  - Instruct patient to call for assistance with activity   - Consult OT/PT to assist with strengthening/mobility   - Keep Call bell within reach  - Keep bed low and locked with side rails adjusted as appropriate  - Keep care items and personal belongings within reach  - Initiate and maintain comfort rounds  - Make Fall Risk Sign visible to staff  - Offer Toileting every 2 Hours, in advance of need  - Initiate/Maintain bed alarm  - Obtain necessary fall risk management equipment: bed alarm non skid footwear   - Apply yellow socks and bracelet for high fall risk patients  - Consider moving patient to room near nurses station  Outcome: Progressing  Goal: Maintain or return to baseline ADL function  Description: INTERVENTIONS:  -  Assess patient's ability to carry out ADLs; assess patient's baseline for ADL function and identify physical deficits which impact ability to perform ADLs (bathing, care of mouth/teeth, toileting, grooming, dressing, etc.)  - Assess/evaluate cause of self-care deficits   - Assess range of motion  -  Assess patient's mobility; develop plan if impaired  - Assess patient's need for assistive devices and provide as appropriate  - Encourage maximum independence but intervene and supervise when necessary  - Involve family in performance of ADLs  - Assess for home care needs following discharge   - Consider OT consult to assist with ADL evaluation and planning for discharge  - Provide patient education as appropriate  Outcome: Progressing  Goal: Maintains/Returns to pre admission functional level  Description: INTERVENTIONS:  - Perform AM-PAC 6 Click Basic Mobility/ Daily Activity assessment daily.  - Set and communicate daily mobility goal to care team and patient/family/caregiver.   - Collaborate with rehabilitation services on mobility goals if consulted  - Perform Range of Motion 3 times a day.  - Reposition patient every 3 hours.  - Dangle patient 3 times a day  - Stand patient 3 times a day  - Ambulate patient 3 times a day  - Out of bed to chair 3 times a day   - Out of bed for meals 3 times a day  - Out of bed for toileting  - Record patient progress and toleration of activity level   Outcome: Progressing     Problem: DISCHARGE PLANNING  Goal: Discharge to home or other facility with appropriate resources  Description: INTERVENTIONS:  - Identify barriers to discharge w/patient and caregiver  - Arrange for needed discharge resources and transportation as appropriate  - Identify discharge learning needs (meds, wound care, etc.)  - Arrange for interpretive services to assist at discharge as needed  - Refer to Case Management Department for coordinating discharge planning if the patient needs post-hospital services based on physician/advanced practitioner order or complex needs related to functional status, cognitive ability, or social support system  Outcome: Progressing     Problem: Knowledge Deficit  Goal: Patient/family/caregiver demonstrates understanding of disease process, treatment plan, medications, and  discharge instructions  Description: Complete learning assessment and assess knowledge base.  Interventions:  - Provide teaching at level of understanding  - Provide teaching via preferred learning methods  Outcome: Progressing

## 2024-10-09 NOTE — ASSESSMENT & PLAN NOTE
Normotensive on presentation  Chronic condition/stable  Continue home medication regimen-Cozaar 25 mg p.o. daily

## 2024-10-09 NOTE — PROGRESS NOTES
Progress Note - Hospitalist   Name: Violet Dick 84 y.o. female I MRN: [id number]  Unit/Bed#: -01 I Date of Admission: 10/8/2024   Date of Service: 10/9/2024 I Hospital Day: 0    Assessment & Plan  Dehydration determined by examination  Index suspicion for overall hypovolemia contributing to weakness  Patient with poor oral intake in setting of dementia  Observe under hospitalist service  Lactic acidosis in absence of SIRS criteria - likely 2/2 volume depletion  Continue maintenance fluids  Low index of suspicion for infection  COVID/flu negative  UA possibly suggestive of UTI, but lack of symptoms lower suspicion  PT and OT recommendation  Level II at d/c  Electrolyte replacement as appropriate  HTN (hypertension)  Normotensive on presentation  Chronic condition/stable  Continue home medication regimen-Cozaar 25 mg p.o. daily  Diabetes mellitus, type 2 (HCC)  Lab Results   Component Value Date    HGBA1C 6.0 (H) 10/08/2024       Recent Labs     10/09/24  0641 10/09/24  1037   POCGLU 102 100       Blood Sugar Average: Last 72 hrs:  (P) 101  Most recent A1c 6.6 greater than 3 months ago-recheck  Without evidence of hyper or hypoglycemia  Hold home medication regimen-empagliflozin 25 mg p.o. daily-proceed with sliding scale insulin coverage  Moderate Alzheimer's dementia without behavioral disturbance, psychotic disturbance, mood disturbance, or anxiety (HCC)  Chronic condition/stable  Continue home medication regimen-Aricept  Fall precautions  Delirium precautions  Fall from standing  Patient with fall from standing  Without evidence of acute bony abnormality on presentation  Reported R foot pain  Foot and ankle XR negative for acute osseous abnormality  PT recommendation  Walker    VTE Pharmacologic Prophylaxis:   Moderate Risk (Score 3-4) - Pharmacological DVT Prophylaxis Ordered: enoxaparin (Lovenox).    Mobility:   Basic Mobility Inpatient Raw Score: 17  -[facility] Goal: 5: Stand one or more mins  [facility]  Achieved: 7: Walk 25 feet or more  -[facility] Goal achieved. Continue to encourage appropriate mobility.    Patient Centered Rounds: I performed bedside rounds with nursing staff today.   Discussions with Specialists or Other Care Team Provider: PT, OT    Education and Discussions with Family / Patient: Updated  (daughter) via phone.    Current Length of Stay: 0 day(s)  Current Patient Status: Observation   Certification Statement: The patient, admitted on an observation basis, will now require > 2 midnight hospital stay due to management of volume depletion  Discharge Plan: Anticipate discharge tomorrow to home.    Code Status: Level 1 - Full Code    Subjective   Patient is a 83 y/o F with PMH of dementia, DM, and HTN presenting with general weakness.   History provided by nurse and patient.   Overnight, no acute events occurred. The patient was not in acute pain or distress. They had nausea with breakfast but no vomiting. No reported concerns with defecating or urinating. Patient has been in a pleasant mood and at ease.    Objective :  Temp:  [97.7 °F (36.5 °C)-97.9 °F (36.6 °C)] 97.9 °F (36.6 °C)  HR:  [] 67  BP: ()/() 128/58  Resp:  [14-21] 18  SpO2:  [95 %-99 %] 95 %  O2 Device: None (Room air)    Body mass index is 26.23 kg/m².     Input and Output Summary (last 24 hours):     Intake/Output Summary (Last 24 hours) at 10/9/2024 1050  Last data filed at 10/9/2024 0501  Gross per 24 hour   Intake 980 ml   Output --   Net 980 ml       Physical Exam  Constitutional:       General: She is not in acute distress.     Appearance: Normal appearance. She is not ill-appearing.   Eyes:      General: No scleral icterus.     Conjunctiva/sclera: Conjunctivae normal.   Cardiovascular:      Rate and Rhythm: Normal rate and regular rhythm.      Pulses: Normal pulses.      Heart sounds: Normal heart sounds. No murmur heard.     No friction rub. No gallop.   Pulmonary:      Effort: Pulmonary effort is  normal. No respiratory distress.      Breath sounds: Normal breath sounds.   Chest:      Chest wall: No tenderness.   Musculoskeletal:         General: No swelling, tenderness or deformity.      Right lower leg: No edema.      Left lower leg: No edema.   Skin:     General: Skin is warm and dry.   Neurological:      Mental Status: She is alert. Mental status is at baseline.      Comments: Patient very hard of hearing but able to communicate and understand via writing and gesturing.         Lines/Drains:              Lab Results: I have reviewed the following results:   Results from last 7 days   Lab Units 10/09/24  0509 10/08/24  1913   WBC Thousand/uL 8.53 10.70*   HEMOGLOBIN g/dL 11.7 13.7   HEMATOCRIT % 36.5 43.8   PLATELETS Thousands/uL 235 304   SEGS PCT %  --  73   LYMPHO PCT %  --  16   MONO PCT %  --  8   EOS PCT %  --  2     Results from last 7 days   Lab Units 10/09/24  0509   SODIUM mmol/L 138   POTASSIUM mmol/L 4.2   CHLORIDE mmol/L 106   CO2 mmol/L 26   BUN mg/dL 29*   CREATININE mg/dL 0.62   ANION GAP mmol/L 6   CALCIUM mg/dL 8.0*   ALBUMIN g/dL 3.3*   TOTAL BILIRUBIN mg/dL 0.82   ALK PHOS U/L 79   ALT U/L 10   AST U/L 10*   GLUCOSE RANDOM mg/dL 104         Results from last 7 days   Lab Units 10/09/24  1037 10/09/24  0641   POC GLUCOSE mg/dl 100 102     Results from last 7 days   Lab Units 10/08/24  1913   HEMOGLOBIN A1C % 6.0*     Results from last 7 days   Lab Units 10/08/24  2138 10/08/24  1913   LACTIC ACID mmol/L 3.4* 3.1*   PROCALCITONIN ng/ml  --  0.11       Recent Cultures (last 7 days):         Imaging Results Review: I reviewed radiology reports from this admission including: xray(s).  Other Study Results Review: EKG was reviewed.     Last 24 Hours Medication List:     Current Facility-Administered Medications:     acetaminophen (TYLENOL) tablet 650 mg, Q6H PRN    atorvastatin (LIPITOR) tablet 20 mg, HS    calcium carbonate (TUMS) chewable tablet 1,000 mg, Daily PRN    clopidogrel (PLAVIX)  tablet 75 mg, Daily    colestipol (COLESTID) tablet 2 g, BID    dicyclomine (BENTYL) tablet 20 mg, Q6H    donepezil (ARICEPT) tablet 10 mg, HS    dronabinol (MARINOL) capsule 2.5 mg, BID AC    enoxaparin (LOVENOX) subcutaneous injection 40 mg, Daily    insulin lispro (HumALOG/ADMELOG) 100 units/mL subcutaneous injection 1-5 Units, TID AC **AND** Fingerstick Glucose (POCT), TID AC    insulin lispro (HumALOG/ADMELOG) 100 units/mL subcutaneous injection 1-5 Units, HS    losartan (COZAAR) tablet 25 mg, Daily    magnesium Oxide (MAG-OX) tablet 800 mg, BID    multi-electrolyte (PLASMALYTE-A/ISOLYTE-S PH 7.4) IV solution, Continuous, Last Rate: 75 mL/hr (10/09/24 0111)    polyethylene glycol (MIRALAX) packet 17 g, Daily    senna (SENOKOT) tablet 8.6 mg, Daily    Administrative Statements   Today, Patient Was Seen By: Lisa Chatman    **Please Note: This note may have been constructed using a voice recognition system.**

## 2024-10-09 NOTE — H&P
"H&P - Hospitalist   Name: Violet Dick 84 y.o. female I MRN: [id number]  Unit/Bed#: FT 02 I Date of Admission: 10/8/2024   Date of Service: 10/9/2024 I Hospital Day: 0     Assessment & Plan  Dehydration determined by examination  Patient presents this evening (10/8) accompanied by her daughter who is her caregiver with reports of decreased intake of liquids and solids as well as generalized weakness and ambulatory dysfunction over the past several days  In addition, patient's daughter notes patient had a mechanical fall last evening, did not strike head, secondary to \"weakness\"  Patient with underlying dementia and baseline ambulatory dysfunction-generally uses a walker  Patient without reports of unilateral weakness/numbness, facial droop, loss of consciousness    In ED, patient hemodynamically stable without oxygen requirement.  And absence of SIRS criteria.  Isolated lactic acidosis  Index suspicion for overall hypovolemia contributing to weakness requesting request for observation    Plan:  Observe under hospitalist service  Maintenance fluids over 12 to 24 hours  Lactic acidosis in absence of SIRS criteria likely the result of underlying volume depletion/dehydration  Monitor for evolution of SIRS criteria  Low index of suspicion for infection-however, UA pending  PT OT evaluation  Electrolyte replacement as appropriate  HTN (hypertension)  Normotensive on presentation  Chronic condition/stable  Continue home medication regimen-Cozaar 25 mg p.o. daily  Diabetes mellitus, type 2 (HCC)  Lab Results   Component Value Date    HGBA1C 6.6 (H) 06/16/2024       No results for input(s): \"POCGLU\" in the last 72 hours.    Blood Sugar Average: Last 72 hrs:    Most recent A1c 6.6 greater than 3 months ago-recheck  Without evidence of hyper or hypoglycemia upon presentation  Hold home medication regimen-empagliflozin 25 mg p.o. daily-proceed with sliding scale insulin coverage  Moderate Alzheimer's dementia without " behavioral disturbance, psychotic disturbance, mood disturbance, or anxiety (HCC)  Chronic condition/stable  Continue home medication regimen-Aricept  Fall precautions  Delirium precautions  Fall from standing  Patient with fall from standing  Without evidence of acute bony abnormality on presentation  However, reportedly some foot pain-plain films ordered-pending  PT OT consult      VTE Pharmacologic Prophylaxis:   High Risk (Score >/= 5) - Pharmacological DVT Prophylaxis Ordered: enoxaparin (Lovenox). Sequential Compression Devices Ordered.  Code Status: Level 1 - Full Code   Discussion with family: Patient declined call to .     Anticipated Length of Stay: Patient will be admitted on an observation basis with an anticipated length of stay of less than 2 midnights secondary to volume depletion and ambulatory dysfunction.    History of Present Illness   Chief Complaint: Generalized weakness    Violet Dick is a 84 y.o. female with a PMH of hypertension, hyperlipidemia, non-insulin-dependent diabetes, advanced age who presents with reports of mechanical fall last evening (10/7) with reports of decreased appetite, weakness, fatigue over the past several days.    Review of Systems   Unable to perform ROS: Dementia       Historical Information   Past Medical History:   Diagnosis Date    Cancer (HCC)     cervical 10 years ago- chemo radiation    Diabetes mellitus (HCC)     Diverticulitis     [place] (hard of hearing)     Hypertension      Past Surgical History:   Procedure Laterality Date    CATARACT EXTRACTION      ELBOW SURGERY Right     HYSTERECTOMY      partial    TOE SURGERY      Daughter unsure side     Social History     Tobacco Use    Smoking status: Never    Smokeless tobacco: Never   Vaping Use    Vaping status: Never Used   Substance and Sexual Activity    Alcohol use: Never    Drug use: Never    Sexual activity: Not Currently     E-Cigarette/Vaping    E-Cigarette Use Never User       E-Cigarette/Vaping Substances    Nicotine No     THC No     CBD No     Flavoring No     Other No     Unknown No      Family history non-contributory  Social History:  Marital Status:      Meds/Allergies   (Not in a hospital admission)    No Known Allergies    Objective :  Temp:  [97.7 °F (36.5 °C)] 97.7 °F (36.5 °C)  HR:  [] 90  BP: (127-139)/(58-72) 132/72  Resp:  [14-21] 20  SpO2:  [96 %-99 %] 96 %  O2 Device: None (Room air)    Physical Exam  Constitutional:       General: She is not in acute distress.     Appearance: She is not ill-appearing, toxic-appearing or diaphoretic.   HENT:      Head: Normocephalic and atraumatic.      Right Ear: External ear normal.      Left Ear: External ear normal.      Nose: Nose normal.      Mouth/Throat:      Pharynx: Oropharynx is clear.   Eyes:      General: No scleral icterus.     Conjunctiva/sclera: Conjunctivae normal.   Cardiovascular:      Rate and Rhythm: Normal rate and regular rhythm.      Pulses: Normal pulses.      Heart sounds: No murmur heard.  Pulmonary:      Effort: No respiratory distress.      Breath sounds: No wheezing.   Abdominal:      General: Abdomen is flat. Bowel sounds are normal.      Palpations: Abdomen is soft.   Musculoskeletal:      Right lower leg: No edema.      Left lower leg: No edema.   Skin:     Coloration: Skin is not jaundiced.      Findings: No bruising or lesion.   Neurological:      Mental Status: She is alert. Mental status is at baseline. She is disoriented.      Cranial Nerves: No cranial nerve deficit.   Psychiatric:         Mood and Affect: Mood normal.         Thought Content: Thought content normal.         Judgment: Judgment normal.         Lines/Drains:            Lab Results: I have reviewed the following results:  Results from last 7 days   Lab Units 10/08/24  1913   WBC Thousand/uL 10.70*   HEMOGLOBIN g/dL 13.7   HEMATOCRIT % 43.8   PLATELETS Thousands/uL 304   SEGS PCT % 73   LYMPHO PCT % 16   MONO PCT % 8    EOS PCT % 2     Results from last 7 days   Lab Units 10/08/24  1913   SODIUM mmol/L 138   POTASSIUM mmol/L 4.2   CHLORIDE mmol/L 100   CO2 mmol/L 26   BUN mg/dL 35*   CREATININE mg/dL 0.86   ANION GAP mmol/L 12   CALCIUM mg/dL 9.1   ALBUMIN g/dL 3.8   TOTAL BILIRUBIN mg/dL 0.84   ALK PHOS U/L 93   ALT U/L 10   AST U/L 12*   GLUCOSE RANDOM mg/dL 194*             Lab Results   Component Value Date    HGBA1C 6.6 (H) 06/16/2024    HGBA1C 6.8 (H) 01/19/2024     Results from last 7 days   Lab Units 10/08/24  2138 10/08/24  1913   LACTIC ACID mmol/L 3.4* 3.1*   PROCALCITONIN ng/ml  --  0.11       Imaging Results Review: No pertinent imaging studies reviewed.  Other Study Results Review: No additional pertinent studies reviewed.    Administrative Statements   I have spent a total time of 46   minutes in caring for this patient on the day of the visit/encounter including Risks and benefits of tx options, Patient and family education, Importance of tx compliance, Documenting in the medical record, Obtaining or reviewing history  , and   .    ** Please Note: This note has been constructed using a voice recognition system. **

## 2024-10-09 NOTE — ASSESSMENT & PLAN NOTE
Index suspicion for overall hypovolemia contributing to weakness  Patient with poor oral intake in setting of dementia  Observe under hospitalist service  Lactic acidosis in absence of SIRS criteria - likely 2/2 volume depletion  Continue maintenance fluids  Low index of suspicion for infection  COVID/flu negative  UA possibly suggestive of UTI, but lack of symptoms lower suspicion  PT and OT recommendation  Level II at d/c  Electrolyte replacement as appropriate

## 2024-10-09 NOTE — OCCUPATIONAL THERAPY NOTE
Occupational Therapy Evaluation      Violet Dick    10/9/2024    Patient Active Problem List   Diagnosis    HTN (hypertension)    Diabetes mellitus, type 2 (HCC)    Acute diverticulitis    History of hysterectomy    Hard of hearing    Stricture of sigmoid colon (HCC)    Dehydration determined by examination    Moderate Alzheimer's dementia without behavioral disturbance, psychotic disturbance, mood disturbance, or anxiety (HCC)    Fall from standing       Past Medical History:   Diagnosis Date    Cancer (HCC)     cervical 10 years ago- chemo radiation    Diabetes mellitus (HCC)     Diverticulitis     [place] (hard of hearing)     Hypertension        Past Surgical History:   Procedure Laterality Date    CATARACT EXTRACTION      ELBOW SURGERY Right     HYSTERECTOMY      partial    TOE SURGERY      Daughter unsure side        10/09/24 0820   OT Last Visit   OT Visit Date 10/09/24   Note Type   Note type Evaluation   Additional Comments Pt agreeable to OT session. Upon arrival pt supine in bed with HOB elevated.   Pain Assessment   Pain Assessment Tool 0-10   Pain Score No Pain   Restrictions/Precautions   Weight Bearing Precautions Per Order No   Other Precautions Cognitive;Chair Alarm;Bed Alarm;Fall Risk;Pain;Multiple lines   Home Living   Type of Home House   Home Layout One level;Performs ADLs on one level;Able to live on main level with bedroom/bathroom;Access   Bathroom Accessibility Accessible   Home Equipment   (no AD used at baseline)   Prior Function   Level of [place] Independent with ADLs;Independent with functional mobility;Needs assistance with IADLS   Lives With Daughter   Receives Help From Family   IADLs Family/Friend/Other provides transportation;Family/Friend/Other provides meals;Family/Friend/Other provides medication management   Falls in the last 6 months 1 to 4   Vocational Retired   Lifestyle   Autonomy Patient reporting being independent with ADLs, ambulatory with no AD, and lives with  daughter   Reciprocal Relationships Daughter   Service to Others Retired   ADL   Eating Assistance 6  Modified independent   Grooming Assistance 5  Supervision/Setup   UB Bathing Assistance 5  Supervision/Setup   LB Bathing Assistance 3  Moderate Assistance   UB Dressing Assistance 4  Minimal Assistance   LB Dressing Assistance 3  Moderate Assistance   Toileting Assistance  4  Minimal Assistance   Additional Comments ADLs based on functional performance during OT eval.   Bed Mobility   Supine to Sit 4  Minimal assistance   Additional items Assist x 1;HOB elevated;Bedrails;Increased time required   Sit to Supine   (DNT: pt seated OOB in recliner at end of session)   Transfers   Sit to Stand 4  Minimal assistance   Additional items Assist x 1;Armrests;Increased time required;Verbal cues   Stand to Sit 4  Minimal assistance   Additional items Assist x 1;Armrests;Increased time required;Verbal cues   Toilet transfer 4  Minimal assistance   Additional items Assist x 1;Increased time required;Standard toilet   Functional Mobility   Functional Mobility 4  Minimal assistance   Additional Comments Pt ambulated to/from bathroom with no overt SOB. Pt grossly unsteady.   Additional items Rolling walker   Balance   Static Sitting Fair +   Dynamic Sitting Fair   Static Standing Fair -   Dynamic Standing Poor +   Activity Tolerance   Activity Tolerance Patient limited by fatigue   Medical Staff Made Aware Pt seen as a co-eval with PT due to the patient's co-morbidities and clinically unstable presentation indicated by chart review.   RUE Assessment   RUE Assessment WFL   LUE Assessment   LUE Assessment WFL   Hand Function   Gross Motor Coordination Functional   Fine Motor Coordination Functional   Cognition   Overall Cognitive Status Impaired   Arousal/Participation Alert;Responsive;Cooperative   Attention Attends with cues to redirect   Orientation Level Oriented to person;Oriented to place;Disoriented to time;Disoriented to  situation   Memory Decreased short term memory;Decreased recall of recent events;Decreased recall of precautions   Following Commands Follows one step commands with increased time or repetition   Comments Pt with significant [place]. Utilized pocket talker vs written communication   Assessment   Limitation Decreased ADL status;Decreased UE strength;Decreased Safe judgement during ADL;Decreased cognition;Decreased endurance;Decreased self-care trans;Decreased high-level ADLs   Prognosis Good   Assessment Patient is a 84 y.o. female seen for OT evaluation s/p admit to [facility]  on 10/8/2024 w/Dehydration determined by examination. Commorbidities affecting patient's functional performance at time of assessment include: HTN, DM2, dementia, and fall. Orders placed for OT evaluation and treatment and out of bed to chair. Prior to admission, Patient reporting being independent with ADLs, ambulatory with no AD, and lives with daughter. Personal factors affecting patient at time of initial evaluation include: limited insight into deficits, difficulty performing ADLs, and difficulty performing IADLs. Upon evaluation, patient requires supervision and minimal  assist for UB ADLs, moderate assist for LB ADLs, transfers with minimal  assist, with the use of Rolling Walker.  Patient is oriented to name,, oriented to place,, disoriented to time,, and disoriented to situation, and presents with limited ability to recall information after a brief period of time (short term memory) / working memory .  Occupational performance is affected by the following deficits: orientation, decreased muscle strength, dynamic sit/ stand balance deficit with poor standing tolerance time for self care and functional mobility, decreased activity tolerance, impaired memory, impaired problem solving, and delayed righting and equilibrium reactions. Patient to benefit from continued Occupational Therapy treatment while in the hospital to address  deficits as defined above and maximize level of functional independence with ADLs and functional mobility. Occupational Performance areas to address include: grooming , bathing/ shower, dressing, toilet hygiene, transfer to all surfaces, and functional ambulation. From OT standpoint, recommendation at time of d/c would be Level II (moderate resource intensity).   Plan   Treatment Interventions ADL retraining;Functional transfer training;UE strengthening/ROM;Endurance training;Cognitive reorientation;Patient/family training;Compensatory technique education;Activityengagement   Goal Expiration Date 10/24/24   OT Treatment Day 0   OT Frequency 3-5x/wk   Discharge Recommendation   Rehab Resource Intensity Level, OT II (Moderate Resource Intensity)   AM-PAC Daily Activity Inpatient   Lower Body Dressing 2   Bathing 2   Toileting 3   Upper Body Dressing 3   Grooming 3   Eating 3   Daily Activity Raw Score 16   Daily Activity Standardized Score (Calc for Raw Score >=11) 35.96   AM-PAC Applied Cognition Inpatient   Following a Speech/Presentation 2   Understanding Ordinary Conversation 3   Taking Medications 2   Remembering Where Things Are Placed or Put Away 2   Remembering List of 4-5 Errands 2   Taking Care of Complicated Tasks 2   Applied Cognition Raw Score 13   Applied Cognition Standardized Score 30.46   Modified McKenzie Scale   Modified McKenzie Scale 4   End of Consult   Patient Position at End of Consult Bedside chair;Bed/Chair alarm activated;All needs within reach   Nurse Communication Nurse aware of consult     GOALS:    *ADL transfers with (S) for increased independence with ADLs/purposeful tasks    *UB ADL with (S) for increased independence with self cares tasks including bathing and dressing    *LB ADL with (S) using adaptive equipment PRN for increased independence with self cares    *Patient will complete toileting with (S) including clothing management and hygiene for return to PLOF    *Increase stand  tolerance x 5  mins for increased engagement with standing purposeful tasks     *Participate in 10 minutes of UE therex to increase overall stamina/activity tolerance for meaningful tasks    *Patient will complete bed mobility with (S) for increased independence to manage own comfort and initiate EOB & OOB purposeful tasks    *Patient will verbalize 3 safety awareness/ principles to prevent falls in the home setting.     *Patient will increase OOB/sitting tolerance to 2-4 hours per day to increase participation in self-care and leisure tasks with no s/s of exertion.     NICK Jansen, OTR/L

## 2024-10-09 NOTE — PHYSICAL THERAPY NOTE
"Physical Therapy Evaluation     Patient's Name: Violet Dick    Admitting Diagnosis  Hypokalemia [E87.6]  Lactic acidosis [E87.20]  Hypomagnesemia [E83.42]  Foot injury [S94.545N]  Fall, initial encounter [W19.XXXA]    Problem List  Patient Active Problem List   Diagnosis    HTN (hypertension)    Diabetes mellitus, type 2 (HCC)    Acute diverticulitis    History of hysterectomy    Hard of hearing    Stricture of sigmoid colon (HCC)    Dehydration determined by examination    Moderate Alzheimer's dementia without behavioral disturbance, psychotic disturbance, mood disturbance, or anxiety (HCC)    Fall from standing       Past Medical History  Past Medical History:   Diagnosis Date    Cancer (HCC)     cervical 10 years ago- chemo radiation    Diabetes mellitus (HCC)     Diverticulitis     [place] (hard of hearing)     Hypertension        Past Surgical History  Past Surgical History:   Procedure Laterality Date    CATARACT EXTRACTION      ELBOW SURGERY Right     HYSTERECTOMY      partial    TOE SURGERY      Daughter unsure side          10/09/24 0821   PT Last Visit   PT Visit Date 10/09/24   Note Type   Note type Evaluation   Pain Assessment   Pain Assessment Tool 0-10   Pain Score No Pain   Restrictions/Precautions   Weight Bearing Precautions Per Order No   Other Precautions Cognitive;Chair Alarm;Bed Alarm;Fall Risk;Pain;Multiple lines   Home Living   Type of Home House   Home Layout One level;Performs ADLs on one level;Able to live on main level with bedroom/bathroom;Access   Home Equipment   (pt denying AD at baseline)   Prior Function   Level of [place] Independent with ADLs;Independent with functional mobility;Needs assistance with IADLS  (per pt)   Lives With Daughter   Receives Help From Family   IADLs Family/Friend/Other provides transportation;Family/Friend/Other provides meals;Family/Friend/Other provides medication management  (\"my daughter does everything\")   Falls in the last 6 months 1 to 4  (pt " denies any falls, however EMR review reveals at least 1 fall 2 days ago)   Vocational Retired   Comments information above obtained from pt at time of PT IE, pt questionable historian, CM to follow up   General   Family/Caregiver Present No   Cognition   Overall Cognitive Status Impaired   Arousal/Participation Alert   Orientation Level Oriented to person;Oriented to place;Disoriented to time;Disoriented to situation   Memory Decreased short term memory;Decreased recall of recent events;Decreased recall of precautions;Decreased recall of biographical information   Following Commands Follows one step commands with increased time or repetition   Comments pt agreeable to PT evaluation, noted significant [place], utilized pocket talker/hearing amplifier vs written communication   RLE Assessment   RLE Assessment   (Grossly 3+/5 observed with functional mobility)   LLE Assessment   LLE Assessment   (Grossly 3+/5 observed with functional mobility)   Coordination   Movements are Fluid and Coordinated 1   Sensation   (difficulty with formal assessment d/t cognitive deficits)   Bed Mobility   Supine to Sit 4  Minimal assistance   Additional items Assist x 1;HOB elevated;Increased time required;Other  (visual gestures)   Additional Comments Pt denied lightheaded/dizziness with transitional movements.   Transfers   Sit to Stand 4  Minimal assistance   Additional items Assist x 1;Armrests;Increased time required;Other  (visual gestures)   Stand to Sit 4  Minimal assistance   Additional items Assist x 1;Armrests;Increased time required   Toilet transfer 4  Minimal assistance   Additional items Assist x 1;Increased time required;Standard toilet   Ambulation/Elevation   Gait pattern Decreased foot clearance;Short stride;Step to;Foward flexed   Gait Assistance 4  Minimal assist   Additional items Assist x 1;Verbal cues   Assistive Device Rolling walker   Distance 15' x2   Balance   Static Sitting Fair +   Dynamic Sitting Fair   Static  Standing Fair -   Dynamic Standing Poor +   Ambulatory Poor +   Endurance Deficit   Endurance Deficit Yes   Activity Tolerance   Activity Tolerance Patient limited by fatigue   Medical Staff Made Aware Pt seen as a co-eval with OT due to the patient's co-morbidities, clinically unstable presentation, and present impairments which are a regression from the patient's baseline.   Nurse Made Aware RAMAN Marcus   Assessment   Prognosis Good   Problem List Decreased strength;Decreased endurance;Impaired balance;Decreased mobility;Impaired hearing;Decreased cognition   Assessment Pt is 84 y.o. female seen for PT evaluation on 10/9/2024 s/p admit [facility] on 10/8/2024 w/ Dehydration determined by examination. PT was consulted to assess pt's functional mobility and d/c needs. Order placed for PT eval and tx. PTA, pt resides with daughter in 1SH, ambulatory without AD. At time of eval, pt requiring min assist for bed mobility, transfers, household distance gait trial with use of RW. Upon evaluation, pt presenting with impaired functional mobility d/t decreased strength, decreased endurance, impaired balance, decreased mobility, decreased cognition, impaired hearing, and activity intolerance. Pertinent PMHx and current co-morbidities affecting pt's physical performance at time of assessment include: dehydration, HTN, type 2 DM, Alzheimer's dementia, fall, [place], stricture of sigmoid colon. Personal factors affecting pt at time of eval include: ambulating w/ assistive device, decreased cognition, positive fall history, and hearing impairments. The following objective measures performed on IE also reveal limitations: Barthel Index: 50/100, Modified [place]: 4 (moderate/severe disability), and AM-PAC 6-Clicks: 17/24. Pt's clinical presentation is currently unstable/unpredictable seen in pt's presentation of advanced age, abnormal lab value(s), and ongoing medical assessment. Overall, pt's rehab potential and prognosis to  return to PLOF is good as impacted by objective findings, warranting pt to receive further skilled PT interventions to address identified impairments, activity limitation(s), and participation restriction(s). Pt to benefit from continued PT tx to address deficits as defined above and maximize level of functional independent mobility. From PT/mobility standpoint, recommend level 2, moderate resource intensity in order to facilitate return to PLOF.   Barriers to Discharge Inaccessible home environment;Decreased caregiver support   Goals   Patient Goals none expressed   STG Expiration Date 10/19/24   Short Term Goal #1 In 7-10 days: Increase bilateral LE strength 1/2 grade to facilitate independent mobility, Perform all bed mobility tasks modified independent to decrease caregiver burden, Perform all transfers modified independent to improve independence, Ambulate > 150 ft. with least restrictive assistive device modified independent w/o LOB and w/ normalized gait pattern 100% of the time, Increase all balance 1/2 grade to decrease risk for falls, Improve Barthel Index score to 65 or greater to facilitate independence, and PT provider will perform functional balance assessment to determine fall risk   PT Treatment Day 0   Plan   Treatment/Interventions Functional transfer training;LE strengthening/ROM;Therapeutic exercise;Endurance training;Patient/family training;Equipment eval/education;Bed mobility;Gait training;Spoke to nursing   PT Frequency 3-5x/wk   Discharge Recommendation   Rehab Resource Intensity Level, PT II (Moderate Resource Intensity)   Equipment Recommended Walker  (RW)   AM-PAC Basic Mobility Inpatient   Turning in Flat Bed Without Bedrails 3   Lying on Back to Sitting on Edge of Flat Bed Without Bedrails 3   Moving Bed to Chair 3   Standing Up From Chair Using Arms 3   Walk in Room 3   Climb 3-5 Stairs With Railing 2   Basic Mobility Inpatient Raw Score 17   Basic Mobility Standardized Score 39.67    Etienne Starkey Highest Level Of Mobility   -[facility] Goal 5: Stand one or more mins   -[facility] Achieved 7: Walk 25 feet or more   Modified [place]win Scale   Modified [place] Scale 4   Barthel Index   Feeding 10   Bathing 0   Grooming Score 0   Dressing Score 5   Bladder Score 10   Bowels Score 10   Toilet Use Score 5   Transfers (Bed/Chair) Score 10   Mobility (Level Surface) Score 0   Stairs Score 0   Barthel Index Score 50         Kanwal Ramos, PT

## 2024-10-09 NOTE — ASSESSMENT & PLAN NOTE
Patient with fall from standing  Without evidence of acute bony abnormality on presentation  However, reportedly some foot pain-plain films ordered-pending  PT OT consult

## 2024-10-10 LAB
ALBUMIN SERPL BCG-MCNC: 3.1 G/DL (ref 3.5–5)
ALP SERPL-CCNC: 74 U/L (ref 34–104)
ALT SERPL W P-5'-P-CCNC: 9 U/L (ref 7–52)
ANION GAP SERPL CALCULATED.3IONS-SCNC: 7 MMOL/L (ref 4–13)
AST SERPL W P-5'-P-CCNC: 11 U/L (ref 13–39)
BASOPHILS # BLD AUTO: 0.03 THOUSANDS/ΜL (ref 0–0.1)
BASOPHILS NFR BLD AUTO: 0 % (ref 0–1)
BILIRUB SERPL-MCNC: 0.73 MG/DL (ref 0.2–1)
BUN SERPL-MCNC: 20 MG/DL (ref 5–25)
CALCIUM ALBUM COR SERPL-MCNC: 8.7 MG/DL (ref 8.3–10.1)
CALCIUM SERPL-MCNC: 8 MG/DL (ref 8.4–10.2)
CHLORIDE SERPL-SCNC: 103 MMOL/L (ref 96–108)
CO2 SERPL-SCNC: 26 MMOL/L (ref 21–32)
CREAT SERPL-MCNC: 0.6 MG/DL (ref 0.6–1.3)
EOSINOPHIL # BLD AUTO: 0.13 THOUSAND/ΜL (ref 0–0.61)
EOSINOPHIL NFR BLD AUTO: 2 % (ref 0–6)
ERYTHROCYTE [DISTWIDTH] IN BLOOD BY AUTOMATED COUNT: 15.1 % (ref 11.6–15.1)
GFR SERPL CREATININE-BSD FRML MDRD: 83 ML/MIN/1.73SQ M
GLUCOSE SERPL-MCNC: 105 MG/DL (ref 65–140)
GLUCOSE SERPL-MCNC: 120 MG/DL (ref 65–140)
GLUCOSE SERPL-MCNC: 154 MG/DL (ref 65–140)
GLUCOSE SERPL-MCNC: 91 MG/DL (ref 65–140)
GLUCOSE SERPL-MCNC: 98 MG/DL (ref 65–140)
GLUCOSE SERPL-MCNC: 99 MG/DL (ref 65–140)
HCT VFR BLD AUTO: 34.9 % (ref 34.8–46.1)
HGB BLD-MCNC: 11.3 G/DL (ref 11.5–15.4)
IMM GRANULOCYTES # BLD AUTO: 0.04 THOUSAND/UL (ref 0–0.2)
IMM GRANULOCYTES NFR BLD AUTO: 1 % (ref 0–2)
LYMPHOCYTES # BLD AUTO: 1.3 THOUSANDS/ΜL (ref 0.6–4.47)
LYMPHOCYTES NFR BLD AUTO: 17 % (ref 14–44)
MCH RBC QN AUTO: 28.2 PG (ref 26.8–34.3)
MCHC RBC AUTO-ENTMCNC: 32.4 G/DL (ref 31.4–37.4)
MCV RBC AUTO: 87 FL (ref 82–98)
MONOCYTES # BLD AUTO: 0.6 THOUSAND/ΜL (ref 0.17–1.22)
MONOCYTES NFR BLD AUTO: 8 % (ref 4–12)
NEUTROPHILS # BLD AUTO: 5.38 THOUSANDS/ΜL (ref 1.85–7.62)
NEUTS SEG NFR BLD AUTO: 72 % (ref 43–75)
NRBC BLD AUTO-RTO: 0 /100 WBCS
PLATELET # BLD AUTO: 220 THOUSANDS/UL (ref 149–390)
PMV BLD AUTO: 9.1 FL (ref 8.9–12.7)
POTASSIUM SERPL-SCNC: 4.1 MMOL/L (ref 3.5–5.3)
PROT SERPL-MCNC: 5.9 G/DL (ref 6.4–8.4)
RBC # BLD AUTO: 4.01 MILLION/UL (ref 3.81–5.12)
SODIUM SERPL-SCNC: 136 MMOL/L (ref 135–147)
WBC # BLD AUTO: 7.48 THOUSAND/UL (ref 4.31–10.16)

## 2024-10-10 PROCEDURE — 85025 COMPLETE CBC W/AUTO DIFF WBC: CPT | Performed by: INTERNAL MEDICINE

## 2024-10-10 PROCEDURE — 82948 REAGENT STRIP/BLOOD GLUCOSE: CPT

## 2024-10-10 PROCEDURE — 99233 SBSQ HOSP IP/OBS HIGH 50: CPT | Performed by: INTERNAL MEDICINE

## 2024-10-10 PROCEDURE — 80053 COMPREHEN METABOLIC PANEL: CPT | Performed by: INTERNAL MEDICINE

## 2024-10-10 RX ORDER — CEFPODOXIME PROXETIL 200 MG/1
200 TABLET, FILM COATED ORAL 2 TIMES DAILY WITH MEALS
Status: DISCONTINUED | OUTPATIENT
Start: 2024-10-10 | End: 2024-10-12 | Stop reason: HOSPADM

## 2024-10-10 RX ADMIN — ENOXAPARIN SODIUM 40 MG: 40 INJECTION SUBCUTANEOUS at 09:20

## 2024-10-10 RX ADMIN — DONEPEZIL HYDROCHLORIDE 10 MG: 5 TABLET ORAL at 23:00

## 2024-10-10 RX ADMIN — DICYCLOMINE HYDROCHLORIDE 20 MG: 20 TABLET ORAL at 04:51

## 2024-10-10 RX ADMIN — CEFPODOXIME PROXETIL 200 MG: 200 TABLET, FILM COATED ORAL at 09:44

## 2024-10-10 RX ADMIN — DRONABINOL 2.5 MG: 2.5 CAPSULE ORAL at 07:30

## 2024-10-10 RX ADMIN — CEFPODOXIME PROXETIL 200 MG: 200 TABLET, FILM COATED ORAL at 16:12

## 2024-10-10 RX ADMIN — Medication 800 MG: at 17:35

## 2024-10-10 RX ADMIN — DRONABINOL 2.5 MG: 2.5 CAPSULE ORAL at 16:12

## 2024-10-10 RX ADMIN — ATORVASTATIN CALCIUM 20 MG: 20 TABLET, FILM COATED ORAL at 23:00

## 2024-10-10 RX ADMIN — Medication 800 MG: at 09:21

## 2024-10-10 RX ADMIN — SENNOSIDES 8.6 MG: 8.6 TABLET, FILM COATED ORAL at 09:21

## 2024-10-10 RX ADMIN — DICYCLOMINE HYDROCHLORIDE 20 MG: 20 TABLET ORAL at 11:05

## 2024-10-10 RX ADMIN — DICYCLOMINE HYDROCHLORIDE 20 MG: 20 TABLET ORAL at 17:35

## 2024-10-10 RX ADMIN — LOSARTAN POTASSIUM 25 MG: 25 TABLET, FILM COATED ORAL at 09:21

## 2024-10-10 RX ADMIN — CLOPIDOGREL 75 MG: 75 TABLET ORAL at 09:21

## 2024-10-10 RX ADMIN — DICYCLOMINE HYDROCHLORIDE 20 MG: 20 TABLET ORAL at 23:00

## 2024-10-10 NOTE — NURSING NOTE
Notified Dr. Cifuentes  regarding pt refused to wear masimo and pt removed 4 ivs on previous shift. Awaiting for response.

## 2024-10-10 NOTE — ASSESSMENT & PLAN NOTE
Index suspicion for overall hypovolemia contributing to weakness  Patient with poor oral intake in setting of dementia  Observe under hospitalist service  Lactic acidosis in absence of SIRS criteria - likely 2/2 volume depletion  Low index of suspicion for infection  COVID/flu negative  UA minimally abnormal - possible UTI/cystitis  Started cefpodoxime  PT and OT recommendation  Level II at d/c  Encourage PO fluid intake  Electrolyte replacement as appropriate  Started dronabinol to increase appetite

## 2024-10-10 NOTE — ASSESSMENT & PLAN NOTE
Lab Results   Component Value Date    HGBA1C 6.0 (H) 10/08/2024       Recent Labs     10/09/24  1037 10/09/24  1550 10/09/24  2033 10/10/24  0659   POCGLU 100 110 110 98       Blood Sugar Average: Last 72 hrs:  (P) 104  HbA1c - 6.0  Without evidence of hyper or hypoglycemia  Held home medication regimen-empagliflozin 25 mg p.o. daily  Continue sliding scale insulin coverage

## 2024-10-10 NOTE — PLAN OF CARE
Problem: METABOLIC, FLUID AND ELECTROLYTES - ADULT  Goal: Electrolytes maintained within normal limits  Description: INTERVENTIONS:  - Monitor labs and assess patient for signs and symptoms of electrolyte imbalances  - Administer electrolyte replacement as ordered  - Monitor response to electrolyte replacements, including repeat lab results as appropriate  - Instruct patient on fluid and nutrition as appropriate  Outcome: Progressing  Goal: Fluid balance maintained  Description: INTERVENTIONS:  - Monitor labs   - Monitor I/O and WT  - Instruct patient on fluid and nutrition as appropriate  - Assess for signs & symptoms of volume excess or deficit  Outcome: Progressing  Goal: Glucose maintained within target range  Description: INTERVENTIONS:  - Monitor Blood Glucose as ordered  - Assess for signs and symptoms of hyperglycemia and hypoglycemia  - Administer ordered medications to maintain glucose within target range  - Assess nutritional intake and initiate nutrition service referral as needed  Outcome: Progressing     Problem: MUSCULOSKELETAL - ADULT  Goal: Maintain or return mobility to safest level of function  Description: INTERVENTIONS:  - Assess patient's ability to carry out ADLs; assess patient's baseline for ADL function and identify physical deficits which impact ability to perform ADLs (bathing, care of mouth/teeth, toileting, grooming, dressing, etc.)  - Assess/evaluate cause of self-care deficits   - Assess range of motion  - Assess patient's mobility  - Assess patient's need for assistive devices and provide as appropriate  - Encourage maximum independence but intervene and supervise when necessary  - Involve family in performance of ADLs  - Assess for home care needs following discharge   - Consider OT consult to assist with ADL evaluation and planning for discharge  - Provide patient education as appropriate  Outcome: Progressing  Goal: Maintain proper alignment of affected body part  Description:  INTERVENTIONS:  - Support, maintain and protect limb and body alignment  - Provide patient/ family with appropriate education  Outcome: Progressing     Problem: PAIN - ADULT  Goal: Verbalizes/displays adequate comfort level or baseline comfort level  Description: Interventions:  - Encourage patient to monitor pain and request assistance  - Assess pain using appropriate pain scale  - Administer analgesics based on type and severity of pain and evaluate response  - Implement non-pharmacological measures as appropriate and evaluate response  - Consider cultural and social influences on pain and pain management  - Notify physician/advanced practitioner if interventions unsuccessful or patient reports new pain  Outcome: Progressing     Problem: SAFETY ADULT  Goal: Patient will remain free of falls  Description: INTERVENTIONS:  - Educate patient/family on patient safety including physical limitations  - Instruct patient to call for assistance with activity   - Consult OT/PT to assist with strengthening/mobility   - Keep Call bell within reach  - Keep bed low and locked with side rails adjusted as appropriate  - Keep care items and personal belongings within reach  - Initiate and maintain comfort rounds  - Make Fall Risk Sign visible to staff  - Offer Toileting every 2 Hours, in advance of need  - Initiate/Maintain bed alarm  - Obtain necessary fall risk management equipment: bed alarm non skid sock necessary communications   - Apply yellow socks and bracelet for high fall risk patients  - Consider moving patient to room near nurses station  Outcome: Progressing  Goal: Maintain or return to baseline ADL function  Description: INTERVENTIONS:  -  Assess patient's ability to carry out ADLs; assess patient's baseline for ADL function and identify physical deficits which impact ability to perform ADLs (bathing, care of mouth/teeth, toileting, grooming, dressing, etc.)  - Assess/evaluate cause of self-care deficits   - Assess  range of motion  - Assess patient's mobility; develop plan if impaired  - Assess patient's need for assistive devices and provide as appropriate  - Encourage maximum independence but intervene and supervise when necessary  - Involve family in performance of ADLs  - Assess for home care needs following discharge   - Consider OT consult to assist with ADL evaluation and planning for discharge  - Provide patient education as appropriate  Outcome: Progressing  Goal: Maintains/Returns to pre admission functional level  Description: INTERVENTIONS:  - Perform AM-PAC 6 Click Basic Mobility/ Daily Activity assessment daily.  - Set and communicate daily mobility goal to care team and patient/family/caregiver.   - Collaborate with rehabilitation services on mobility goals if consulted  - Perform Range of Motion 3 times a day.  - Reposition patient every 3 hours.  - Dangle patient 3 times a day  - Stand patient 3 times a day  - Ambulate patient 3 times a day  - Out of bed to chair 3 times a day   - Out of bed for meals 3 times a day  - Out of bed for toileting  - Record patient progress and toleration of activity level   Outcome: Progressing     Problem: DISCHARGE PLANNING  Goal: Discharge to home or other facility with appropriate resources  Description: INTERVENTIONS:  - Identify barriers to discharge w/patient and caregiver  - Arrange for needed discharge resources and transportation as appropriate  - Identify discharge learning needs (meds, wound care, etc.)  - Arrange for interpretive services to assist at discharge as needed  - Refer to Case Management Department for coordinating discharge planning if the patient needs post-hospital services based on physician/advanced practitioner order or complex needs related to functional status, cognitive ability, or social support system  Outcome: Progressing     Problem: Knowledge Deficit  Goal: Patient/family/caregiver demonstrates understanding of disease process, treatment plan,  medications, and discharge instructions  Description: Complete learning assessment and assess knowledge base.  Interventions:  - Provide teaching at level of understanding  - Provide teaching via preferred learning methods  Outcome: Progressing     Problem: Prexisting or High Potential for Compromised Skin Integrity  Goal: Skin integrity is maintained or improved  Description: INTERVENTIONS:  - Identify patients at risk for skin breakdown  - Assess and monitor skin integrity  - Assess and monitor nutrition and hydration status  - Monitor labs   - Assess for incontinence   - Turn and reposition patient  - Assist with mobility/ambulation  - Relieve pressure over bony prominences  - Avoid friction and shearing  - Provide appropriate hygiene as needed including keeping skin clean and dry  - Evaluate need for skin moisturizer/barrier cream  - Collaborate with interdisciplinary team   - Patient/family teaching  - Consider wound care consult   Outcome: Progressing     Problem: Nutrition/Hydration-ADULT  Goal: Nutrient/Hydration intake appropriate for improving, restoring or maintaining nutritional needs  Description: Monitor and assess patient's nutrition/hydration status for malnutrition. Collaborate with interdisciplinary team and initiate plan and interventions as ordered.  Monitor patient's weight and dietary intake as ordered or per policy. Utilize nutrition screening tool and intervene as necessary. Determine patient's food preferences and provide high-protein, high-caloric foods as appropriate.     INTERVENTIONS:  - Monitor oral intake, urinary output, labs, and treatment plans  - Assess nutrition and hydration status and recommend course of action  - Evaluate amount of meals eaten  - Assist patient with eating if necessary   - Allow adequate time for meals  - Recommend/ encourage appropriate diets, oral nutritional supplements, and vitamin/mineral supplements  - Order, calculate, and assess calorie counts as  needed  - Recommend, monitor, and adjust tube feedings and TPN/PPN based on assessed needs  - Assess need for intravenous fluids  - Provide specific nutrition/hydration education as appropriate  - Include patient/family/caregiver in decisions related to nutrition  Outcome: Progressing

## 2024-10-10 NOTE — CASE MANAGEMENT
Case Management Progress Note    Patient name Violet Dick  Location /-01 MRN [id number]  : 1940 Date 10/10/2024       LOS (days): 1  Geometric Mean LOS (GMLOS) (days): 2.5  Days to GMLOS:1.7        OBJECTIVE:        Current admission status: Inpatient  Preferred Pharmacy:   [id number] - [place] - [address]  [address]  [place] [id number]  Phone: [phone] Fax: [phone]    Primary Care Provider: Tono Gore    Primary Insurance: MEDICARE  Secondary Insurance: [employer] AND [employer] [facility]    PROGRESS NOTE:  As per SLIM rounds, pt is anticipated for dc within 48-72hrs. Pt has a Level 2 recommendation, blanket referrals made for both. CM continues to follow and assist with pt dc plans.

## 2024-10-10 NOTE — NURSING NOTE
Leave her without IV and without Greg. The antibiotics can be done by mouth -response from Dr. Cifuentes

## 2024-10-10 NOTE — NURSING NOTE
This RN and evening shift PCA applied masimo to pt. Pt continues to remove it. Provider is aware.   Pt also is ripping out IV's. Provider is aware of this as well. A new IV was placed successfully.

## 2024-10-10 NOTE — PROGRESS NOTES
Progress Note - Hospitalist   Name: Violet Dick 84 y.o. female I MRN: [id number]  Unit/Bed#: -01 I Date of Admission: 10/8/2024   Date of Service: 10/10/2024 I Hospital Day: 1    Assessment & Plan  Dehydration determined by examination  Index suspicion for overall hypovolemia contributing to weakness  Patient with poor oral intake in setting of dementia  Observe under hospitalist service  Lactic acidosis in absence of SIRS criteria - likely 2/2 volume depletion  Low index of suspicion for infection  COVID/flu negative  UA minimally abnormal - possible UTI/cystitis  Started cefpodoxime  PT and OT recommendation  Level II at d/c  Encourage PO fluid intake  Electrolyte replacement as appropriate  Started dronabinol to increase appetite  HTN (hypertension)  Normotensive on presentation  Chronic condition/stable  Continue home medication regimen-Cozaar 25 mg p.o. daily  Diabetes mellitus, type 2 (HCC)  Lab Results   Component Value Date    HGBA1C 6.0 (H) 10/08/2024       Recent Labs     10/09/24  1037 10/09/24  1550 10/09/24  2033 10/10/24  0659   POCGLU 100 110 110 98       Blood Sugar Average: Last 72 hrs:  (P) 104  HbA1c - 6.0  Without evidence of hyper or hypoglycemia  Held home medication regimen-empagliflozin 25 mg p.o. daily  Continue sliding scale insulin coverage  Moderate Alzheimer's dementia without behavioral disturbance, psychotic disturbance, mood disturbance, or anxiety (HCC)  Chronic condition/stable  Continue home medication regimen-Aricept  Fall precautions  Delirium precautions  Fall from standing  Patient with fall from standing  Without evidence of acute bony abnormality on presentation  Reported R foot pain  Foot and ankle XR negative for acute osseous abnormality  PT recommendation  Walker    VTE Pharmacologic Prophylaxis:   Moderate Risk (Score 3-4) - Pharmacological DVT Prophylaxis Ordered: enoxaparin (Lovenox).    Mobility:   Basic Mobility Inpatient Raw Score: 17  -[facility] Goal: 5:  Stand one or more mins  JH-HLM Achieved: 6: Walk 10 steps or more  JH-HLM Goal achieved. Continue to encourage appropriate mobility.    Patient Centered Rounds: I performed bedside rounds with nursing staff today.   Discussions with Specialists or Other Care Team Provider: PT, OT    Education and Discussions with Family / Patient: Updated  (daughter) via phone.    Current Length of Stay: 1 day(s)  Current Patient Status: Inpatient   Certification Statement: The patient will continue to require additional inpatient hospital stay due to treatment of volume depletion and possible UTI/cystitis in setting of poor PO intake  Discharge Plan: Anticipate discharge in 48-72 hrs to home.    Code Status: Level 1 - Full Code    Subjective   Patient is a 83 y/o F with history of dementia, DM, and HTN being treated for volume depletion in the setting of worsening PO intake.  History provided by patient and nurse.  Overnight, the patient pulled their IV out for the 4th time and refused masimo use.   The patient reports no pain or discomfort.   The patient ate very little and refuses to eat when encouraged, but reports feeling hungry. When asked, the patient does not have a reason for not eating, including pain or distaste. The patient drinks a little. No report of nausea or vomiting.   The patient is incontinent, but refuses nurse aid in urinating.  Overall, the patient has remained clinically stable but continues to have poor PO intake.    Objective :  Temp:  [97.6 °F (36.4 °C)-97.7 °F (36.5 °C)] 97.7 °F (36.5 °C)  HR:  [71-77] 77  BP: (128-150)/(50-59) 150/59  Resp:  [20] 20  SpO2:  [94 %-96 %] 96 %  O2 Device: None (Room air)    Body mass index is 26.23 kg/m².     Input and Output Summary (last 24 hours):     Intake/Output Summary (Last 24 hours) at 10/10/2024 1031  Last data filed at 10/10/2024 0919  Gross per 24 hour   Intake 840 ml   Output --   Net 840 ml       Physical Exam  Constitutional:       General: She  is not in acute distress.     Appearance: She is not toxic-appearing.   HENT:      Head: Normocephalic and atraumatic.   Eyes:      General: No scleral icterus.     Conjunctiva/sclera: Conjunctivae normal.   Cardiovascular:      Rate and Rhythm: Normal rate and regular rhythm.      Pulses: Normal pulses.      Heart sounds: Normal heart sounds. No murmur heard.     No friction rub. No gallop.   Pulmonary:      Effort: Pulmonary effort is normal. No respiratory distress.      Breath sounds: Normal breath sounds. No stridor. No wheezing, rhonchi or rales.   Chest:      Chest wall: No tenderness.   Musculoskeletal:         General: No swelling.      Right lower leg: No edema.      Left lower leg: No edema.   Skin:     General: Skin is warm and dry.   Neurological:      Mental Status: She is alert.      Comments: Alert and oriented to self and place. Still able to communicate well through writing and gestures.         Lines/Drains:              Lab Results: I have reviewed the following results:   Results from last 7 days   Lab Units 10/10/24  0444   WBC Thousand/uL 7.48   HEMOGLOBIN g/dL 11.3*   HEMATOCRIT % 34.9   PLATELETS Thousands/uL 220   SEGS PCT % 72   LYMPHO PCT % 17   MONO PCT % 8   EOS PCT % 2     Results from last 7 days   Lab Units 10/10/24  0444   SODIUM mmol/L 136   POTASSIUM mmol/L 4.1   CHLORIDE mmol/L 103   CO2 mmol/L 26   BUN mg/dL 20   CREATININE mg/dL 0.60   ANION GAP mmol/L 7   CALCIUM mg/dL 8.0*   ALBUMIN g/dL 3.1*   TOTAL BILIRUBIN mg/dL 0.73   ALK PHOS U/L 74   ALT U/L 9   AST U/L 11*   GLUCOSE RANDOM mg/dL 91         Results from last 7 days   Lab Units 10/10/24  0659 10/09/24  2033 10/09/24  1550 10/09/24  1037 10/09/24  0641   POC GLUCOSE mg/dl 98 110 110 100 102     Results from last 7 days   Lab Units 10/08/24  1913   HEMOGLOBIN A1C % 6.0*     Results from last 7 days   Lab Units 10/08/24  2138 10/08/24  1913   LACTIC ACID mmol/L 3.4* 3.1*   PROCALCITONIN ng/ml  --  0.11       Recent  Cultures (last 7 days):         Imaging Results Review: I reviewed radiology reports from this admission including: xray(s).  Other Study Results Review: EKG was reviewed.     Last 24 Hours Medication List:     Current Facility-Administered Medications:     acetaminophen (TYLENOL) tablet 650 mg, Q6H PRN    atorvastatin (LIPITOR) tablet 20 mg, HS    calcium carbonate (TUMS) chewable tablet 1,000 mg, Daily PRN    cefpodoxime (VANTIN) tablet 200 mg, BID With Meals    clopidogrel (PLAVIX) tablet 75 mg, Daily    colestipol (COLESTID) tablet 2 g, BID    dicyclomine (BENTYL) tablet 20 mg, Q6H    donepezil (ARICEPT) tablet 10 mg, HS    dronabinol (MARINOL) capsule 2.5 mg, BID AC    enoxaparin (LOVENOX) subcutaneous injection 40 mg, Daily    insulin lispro (HumALOG/ADMELOG) 100 units/mL subcutaneous injection 1-5 Units, TID AC **AND** Fingerstick Glucose (POCT), TID AC    insulin lispro (HumALOG/ADMELOG) 100 units/mL subcutaneous injection 1-5 Units, HS    losartan (COZAAR) tablet 25 mg, Daily    magnesium Oxide (MAG-OX) tablet 800 mg, BID    multi-electrolyte (PLASMALYTE-A/ISOLYTE-S PH 7.4) IV solution, Continuous, Last Rate: 75 mL/hr (10/09/24 1947)    polyethylene glycol (MIRALAX) packet 17 g, Daily    senna (SENOKOT) tablet 8.6 mg, Daily    Administrative Statements   Today, Patient Was Seen By: Lsia Chatman    **Please Note: This note may have been constructed using a voice recognition system.**

## 2024-10-11 LAB
BACTERIA UR QL AUTO: ABNORMAL /HPF
BILIRUB UR QL STRIP: NEGATIVE
CLARITY UR: CLEAR
COLOR UR: YELLOW
GLUCOSE SERPL-MCNC: 110 MG/DL (ref 65–140)
GLUCOSE SERPL-MCNC: 121 MG/DL (ref 65–140)
GLUCOSE SERPL-MCNC: 124 MG/DL (ref 65–140)
GLUCOSE SERPL-MCNC: 125 MG/DL (ref 65–140)
GLUCOSE UR STRIP-MCNC: ABNORMAL MG/DL
HGB UR QL STRIP.AUTO: NEGATIVE
KETONES UR STRIP-MCNC: NEGATIVE MG/DL
LEUKOCYTE ESTERASE UR QL STRIP: ABNORMAL
NITRITE UR QL STRIP: NEGATIVE
NON-SQ EPI CELLS URNS QL MICRO: ABNORMAL /HPF
PH UR STRIP.AUTO: 6.5 [PH]
PROT UR STRIP-MCNC: ABNORMAL MG/DL
RBC #/AREA URNS AUTO: ABNORMAL /HPF
SP GR UR STRIP.AUTO: 1.02 (ref 1–1.03)
UROBILINOGEN UR STRIP-ACNC: <2 MG/DL
WBC #/AREA URNS AUTO: ABNORMAL /HPF

## 2024-10-11 PROCEDURE — 99233 SBSQ HOSP IP/OBS HIGH 50: CPT | Performed by: INTERNAL MEDICINE

## 2024-10-11 PROCEDURE — 87086 URINE CULTURE/COLONY COUNT: CPT | Performed by: INTERNAL MEDICINE

## 2024-10-11 PROCEDURE — 82948 REAGENT STRIP/BLOOD GLUCOSE: CPT

## 2024-10-11 PROCEDURE — 81001 URINALYSIS AUTO W/SCOPE: CPT | Performed by: INTERNAL MEDICINE

## 2024-10-11 RX ADMIN — ENOXAPARIN SODIUM 40 MG: 40 INJECTION SUBCUTANEOUS at 09:05

## 2024-10-11 RX ADMIN — COLESTIPOL HYDROCHLORIDE 2 G: 1 TABLET, FILM COATED ORAL at 18:05

## 2024-10-11 RX ADMIN — DRONABINOL 2.5 MG: 2.5 CAPSULE ORAL at 16:58

## 2024-10-11 RX ADMIN — DRONABINOL 2.5 MG: 2.5 CAPSULE ORAL at 06:25

## 2024-10-11 RX ADMIN — DICYCLOMINE HYDROCHLORIDE 20 MG: 20 TABLET ORAL at 16:57

## 2024-10-11 RX ADMIN — DICYCLOMINE HYDROCHLORIDE 20 MG: 20 TABLET ORAL at 06:25

## 2024-10-11 RX ADMIN — DONEPEZIL HYDROCHLORIDE 10 MG: 5 TABLET ORAL at 22:19

## 2024-10-11 RX ADMIN — LOSARTAN POTASSIUM 25 MG: 25 TABLET, FILM COATED ORAL at 09:05

## 2024-10-11 RX ADMIN — Medication 800 MG: at 09:05

## 2024-10-11 RX ADMIN — Medication 800 MG: at 16:58

## 2024-10-11 RX ADMIN — COLESTIPOL HYDROCHLORIDE 2 G: 1 TABLET, FILM COATED ORAL at 11:06

## 2024-10-11 RX ADMIN — ATORVASTATIN CALCIUM 20 MG: 20 TABLET, FILM COATED ORAL at 22:19

## 2024-10-11 RX ADMIN — CEFPODOXIME PROXETIL 200 MG: 200 TABLET, FILM COATED ORAL at 16:58

## 2024-10-11 RX ADMIN — CEFPODOXIME PROXETIL 200 MG: 200 TABLET, FILM COATED ORAL at 09:08

## 2024-10-11 RX ADMIN — CLOPIDOGREL 75 MG: 75 TABLET ORAL at 09:05

## 2024-10-11 RX ADMIN — DICYCLOMINE HYDROCHLORIDE 20 MG: 20 TABLET ORAL at 12:05

## 2024-10-11 NOTE — ASSESSMENT & PLAN NOTE
Index suspicion for overall hypovolemia contributing to weakness  Patient with poor oral intake in setting of dementia  Observe under hospitalist service  Lactic acidosis in absence of SIRS criteria - likely 2/2 volume depletion  Low index of suspicion for infection  COVID/flu negative  UA minimally abnormal - possible UTI/cystitis  Continue cefpodoxime  PT and OT recommendation  Level II at d/c  Encourage PO fluid intake  Continue Ensure supplement  Electrolyte replacement as appropriate  Continue dronabinol to increase appetite

## 2024-10-11 NOTE — CASE MANAGEMENT
Case Management Discharge Planning Note    Patient name Violet Dick  Location /-01 MRN [id number]  : 1940 Date 10/11/2024       Current Admission Date: 10/8/2024  Current Admission Diagnosis:Dehydration determined by examination   Patient Active Problem List    Diagnosis Date Noted Date Diagnosed    Dehydration determined by examination 10/08/2024     Moderate Alzheimer's dementia without behavioral disturbance, psychotic disturbance, mood disturbance, or anxiety (HCC) 10/08/2024     Fall from standing 10/08/2024     Stricture of sigmoid colon (HCC) 2024     History of hysterectomy 2024     Hard of hearing 2024     HTN (hypertension) 2024     Diabetes mellitus, type 2 (HCC) 2024     Acute diverticulitis 2024       LOS (days): 2  Geometric Mean LOS (GMLOS) (days): 2.5  Days to GMLOS:0.6     OBJECTIVE:  Risk of Unplanned Readmission Score: 33.27         Current admission status: Inpatient   Preferred Pharmacy:   [id number] - [place] - [address]  [address]  [facility] [address]  Phone: [phone] Fax: [phone]    Primary Care Provider: Tono Gore    Primary Insurance: MEDICARE  Secondary Insurance: [place] [employer] AND [facility]    DISCHARGE DETAILS:    Discharge planning discussed with:: Pt dtr  Freedom of Choice: Yes  Comments - Freedom of Choice: CM called and spoke with dtr at length and discussed dc plans. CM informed pt dtr that pt has a Level 2 reccomendationa nd pt choice list for STR and HHC were placed in pt room to be reviewed with family. Pt dtr thanks CM and is encouraged to seek CM for any questions or concenrs.  CM contacted family/caregiver?: Yes  Were Treatment Team discharge recommendations reviewed with patient/caregiver?: Yes  Did patient/caregiver verbalize understanding of patient care needs?: Yes  Were patient/caregiver advised of the risks associated with not  following Treatment Team discharge recommendations?: Yes    Contacts  Patient Contacts: Marsha (Daughter)  [phone]  Relationship to Patient:: Family  Contact Method: Phone  Phone Number: [phone]  Reason/Outcome: Continuity of Care, Emergency Contact, Discharge Planning, Referral    Requested Home Health Care         Is the patient interested in HHC at discharge?: No    DME Referral Provided  Referral made for DME?: No    Other Referral/Resources/Interventions Provided:  Interventions: None Indicated    Would you like to participate in our [facility] service program?  : No - Declined    Treatment Team Recommendation: Home with Home Health Care, Short Term Rehab  Discharge Destination Plan:: Home with Home Health Care, Short Term Rehab  Transport at Discharge : Stretcher van, Family                             IMM Given (Date):: 10/11/24  IMM Given to:: Family  Family notified:: IMM reviewed with pt dtr Marsha over the phone. Pt dtr expressed full and complete understanding. Verbal Consent obtained. Copy given and original in MR

## 2024-10-11 NOTE — NURSING NOTE
Pt is refusing Masimo. Masimo put back on multiple times and pt educated on importance. Pt continues to remove the sensor.

## 2024-10-11 NOTE — PLAN OF CARE
Problem: METABOLIC, FLUID AND ELECTROLYTES - ADULT  Goal: Electrolytes maintained within normal limits  Description: INTERVENTIONS:  - Monitor labs and assess patient for signs and symptoms of electrolyte imbalances  - Administer electrolyte replacement as ordered  - Monitor response to electrolyte replacements, including repeat lab results as appropriate  - Instruct patient on fluid and nutrition as appropriate  Outcome: Progressing  Goal: Fluid balance maintained  Description: INTERVENTIONS:  - Monitor labs   - Monitor I/O and WT  - Instruct patient on fluid and nutrition as appropriate  - Assess for signs & symptoms of volume excess or deficit  Outcome: Progressing  Goal: Glucose maintained within target range  Description: INTERVENTIONS:  - Monitor Blood Glucose as ordered  - Assess for signs and symptoms of hyperglycemia and hypoglycemia  - Administer ordered medications to maintain glucose within target range  - Assess nutritional intake and initiate nutrition service referral as needed  Outcome: Progressing     Problem: SKIN/TISSUE INTEGRITY - ADULT  Goal: Skin Integrity remains intact(Skin Breakdown Prevention)  Description: Assess:  -Perform Kavon assessment every   -Clean and moisturize skin every   -Inspect skin when repositioning, toileting, and assisting with ADLS  -Assess under medical devices such as  every   -Assess extremities for adequate circulation and sensation     Bed Management:  -Have minimal linens on bed & keep smooth, unwrinkled  -Change linens as needed when moist or perspiring  -Avoid sitting or lying in one position for more than  hours while in bed  -Keep HOB at degrees     Toileting:  -Offer bedside commode  -Assess for incontinence every   -Use incontinent care products after each incontinent episode such as     Activity:  -Mobilize patient  times a day  -Encourage activity and walks on unit  -Encourage or provide ROM exercises   -Turn and reposition patient every  Hours  -Use  appropriate equipment to lift or move patient in bed  -Instruct/ Assist with weight shifting every  when out of bed in chair  -Consider limitation of chair time  hour intervals    Skin Care:  -Avoid use of baby powder, tape, friction and shearing, hot water or constrictive clothing  -Relieve pressure over bony prominences using   -Do not massage red bony areas    Next Steps:  -Teach patient strategies to minimize risks such as    -Consider consults to  interdisciplinary teams such as   Outcome: Progressing  Goal: Incision(s), wounds(s) or drain site(s) healing without S/S of infection  Description: INTERVENTIONS  - Assess and document dressing, incision, wound bed, drain sites and surrounding tissue  - Provide patient and family education  - Perform skin care/dressing changes every   Outcome: Progressing  Goal: Pressure injury heals and does not worsen  Description: Interventions:  - Implement low air loss mattress or specialty surface (Criteria met)  - Apply silicone foam dressing  - Instruct/assist with weight shifting every  minutes when in chair   - Limit chair time to  hour intervals  - Use special pressure reducing interventions such as  when in chair   - Apply fecal or urinary incontinence containment device   - Perform passive or active ROM every   - Turn and reposition patient & offload bony prominences every  hours   - Utilize friction reducing device or surface for transfers   - Consider consults to  interdisciplinary teams such as   - Use incontinent care products after each incontinent episode such as   - Consider nutrition services referral as needed  Outcome: Progressing     Problem: MUSCULOSKELETAL - ADULT  Goal: Maintain or return mobility to safest level of function  Description: INTERVENTIONS:  - Assess patient's ability to carry out ADLs; assess patient's baseline for ADL function and identify physical deficits which impact ability to perform ADLs (bathing, care of mouth/teeth, toileting,  grooming, dressing, etc.)  - Assess/evaluate cause of self-care deficits   - Assess range of motion  - Assess patient's mobility  - Assess patient's need for assistive devices and provide as appropriate  - Encourage maximum independence but intervene and supervise when necessary  - Involve family in performance of ADLs  - Assess for home care needs following discharge   - Consider OT consult to assist with ADL evaluation and planning for discharge  - Provide patient education as appropriate  Outcome: Progressing  Goal: Maintain proper alignment of affected body part  Description: INTERVENTIONS:  - Support, maintain and protect limb and body alignment  - Provide patient/ family with appropriate education  Outcome: Progressing     Problem: PAIN - ADULT  Goal: Verbalizes/displays adequate comfort level or baseline comfort level  Description: Interventions:  - Encourage patient to monitor pain and request assistance  - Assess pain using appropriate pain scale  - Administer analgesics based on type and severity of pain and evaluate response  - Implement non-pharmacological measures as appropriate and evaluate response  - Consider cultural and social influences on pain and pain management  - Notify physician/advanced practitioner if interventions unsuccessful or patient reports new pain  Outcome: Progressing     Problem: SAFETY ADULT  Goal: Patient will remain free of falls  Description: INTERVENTIONS:  - Educate patient/family on patient safety including physical limitations  - Instruct patient to call for assistance with activity   - Consult OT/PT to assist with strengthening/mobility   - Keep Call bell within reach  - Keep bed low and locked with side rails adjusted as appropriate  - Keep care items and personal belongings within reach  - Initiate and maintain comfort rounds  - Make Fall Risk Sign visible to staff  - Offer Toileting every  Hours, in advance of need  - Initiate/Maintain alarm  - Obtain necessary fall  risk management equipment:   - Apply yellow socks and bracelet for high fall risk patients  - Consider moving patient to room near nurses station  Outcome: Progressing  Goal: Maintain or return to baseline ADL function  Description: INTERVENTIONS:  -  Assess patient's ability to carry out ADLs; assess patient's baseline for ADL function and identify physical deficits which impact ability to perform ADLs (bathing, care of mouth/teeth, toileting, grooming, dressing, etc.)  - Assess/evaluate cause of self-care deficits   - Assess range of motion  - Assess patient's mobility; develop plan if impaired  - Assess patient's need for assistive devices and provide as appropriate  - Encourage maximum independence but intervene and supervise when necessary  - Involve family in performance of ADLs  - Assess for home care needs following discharge   - Consider OT consult to assist with ADL evaluation and planning for discharge  - Provide patient education as appropriate  Outcome: Progressing  Goal: Maintains/Returns to pre admission functional level  Description: INTERVENTIONS:  - Perform AM-PAC 6 Click Basic Mobility/ Daily Activity assessment daily.  - Set and communicate daily mobility goal to care team and patient/family/caregiver.   - Collaborate with rehabilitation services on mobility goals if consulted  - Perform Range of Motion  times a day.  - Reposition patient every  hours.  - Dangle patient  times a day  - Stand patient  times a day  - Ambulate patient  times a day  - Out of bed to chair  times a day   - Out of bed for meals times a day  - Out of bed for toileting  - Record patient progress and toleration of activity level   Outcome: Progressing     Problem: DISCHARGE PLANNING  Goal: Discharge to home or other facility with appropriate resources  Description: INTERVENTIONS:  - Identify barriers to discharge w/patient and caregiver  - Arrange for needed discharge resources and transportation as appropriate  - Identify  discharge learning needs (meds, wound care, etc.)  - Arrange for interpretive services to assist at discharge as needed  - Refer to Case Management Department for coordinating discharge planning if the patient needs post-hospital services based on physician/advanced practitioner order or complex needs related to functional status, cognitive ability, or social support system  Outcome: Progressing     Problem: Knowledge Deficit  Goal: Patient/family/caregiver demonstrates understanding of disease process, treatment plan, medications, and discharge instructions  Description: Complete learning assessment and assess knowledge base.  Interventions:  - Provide teaching at level of understanding  - Provide teaching via preferred learning methods  Outcome: Progressing     Problem: Prexisting or High Potential for Compromised Skin Integrity  Goal: Skin integrity is maintained or improved  Description: INTERVENTIONS:  - Identify patients at risk for skin breakdown  - Assess and monitor skin integrity  - Assess and monitor nutrition and hydration status  - Monitor labs   - Assess for incontinence   - Turn and reposition patient  - Assist with mobility/ambulation  - Relieve pressure over bony prominences  - Avoid friction and shearing  - Provide appropriate hygiene as needed including keeping skin clean and dry  - Evaluate need for skin moisturizer/barrier cream  - Collaborate with interdisciplinary team   - Patient/family teaching  - Consider wound care consult   Outcome: Progressing     Problem: Nutrition/Hydration-ADULT  Goal: Nutrient/Hydration intake appropriate for improving, restoring or maintaining nutritional needs  Description: Monitor and assess patient's nutrition/hydration status for malnutrition. Collaborate with interdisciplinary team and initiate plan and interventions as ordered.  Monitor patient's weight and dietary intake as ordered or per policy. Utilize nutrition screening tool and intervene as necessary.  Determine patient's food preferences and provide high-protein, high-caloric foods as appropriate.     INTERVENTIONS:  - Monitor oral intake, urinary output, labs, and treatment plans  - Assess nutrition and hydration status and recommend course of action  - Evaluate amount of meals eaten  - Assist patient with eating if necessary   - Allow adequate time for meals  - Recommend/ encourage appropriate diets, oral nutritional supplements, and vitamin/mineral supplements  - Order, calculate, and assess calorie counts as needed  - Recommend, monitor, and adjust tube feedings and TPN/PPN based on assessed needs  - Assess need for intravenous fluids  - Provide specific nutrition/hydration education as appropriate  - Include patient/family/caregiver in decisions related to nutrition  Outcome: Progressing

## 2024-10-11 NOTE — PLAN OF CARE
Problem: METABOLIC, FLUID AND ELECTROLYTES - ADULT  Goal: Electrolytes maintained within normal limits  Description: INTERVENTIONS:  - Monitor labs and assess patient for signs and symptoms of electrolyte imbalances  - Administer electrolyte replacement as ordered  - Monitor response to electrolyte replacements, including repeat lab results as appropriate  - Instruct patient on fluid and nutrition as appropriate  Outcome: Progressing     Problem: METABOLIC, FLUID AND ELECTROLYTES - ADULT  Goal: Glucose maintained within target range  Description: INTERVENTIONS:  - Monitor Blood Glucose as ordered  - Assess for signs and symptoms of hyperglycemia and hypoglycemia  - Administer ordered medications to maintain glucose within target range  - Assess nutritional intake and initiate nutrition service referral as needed  Outcome: Progressing     Problem: PAIN - ADULT  Goal: Verbalizes/displays adequate comfort level or baseline comfort level  Description: Interventions:  - Encourage patient to monitor pain and request assistance  - Assess pain using appropriate pain scale  - Administer analgesics based on type and severity of pain and evaluate response  - Implement non-pharmacological measures as appropriate and evaluate response  - Consider cultural and social influences on pain and pain management  - Notify physician/advanced practitioner if interventions unsuccessful or patient reports new pain  Outcome: Progressing     Problem: DISCHARGE PLANNING  Goal: Discharge to home or other facility with appropriate resources  Description: INTERVENTIONS:  - Identify barriers to discharge w/patient and caregiver  - Arrange for needed discharge resources and transportation as appropriate  - Identify discharge learning needs (meds, wound care, etc.)  - Arrange for interpretive services to assist at discharge as needed  - Refer to Case Management Department for coordinating discharge planning if the patient needs post-hospital  services based on physician/advanced practitioner order or complex needs related to functional status, cognitive ability, or social support system  Outcome: Progressing     Problem: Knowledge Deficit  Goal: Patient/family/caregiver demonstrates understanding of disease process, treatment plan, medications, and discharge instructions  Description: Complete learning assessment and assess knowledge base.  Interventions:  - Provide teaching at level of understanding  - Provide teaching via preferred learning methods  Outcome: Progressing

## 2024-10-11 NOTE — PROGRESS NOTES
Progress Note - Hospitalist   Name: Violet Dick 84 y.o. female I MRN: [id number]  Unit/Bed#: -01 I Date of Admission: 10/8/2024   Date of Service: 10/11/2024 I Hospital Day: 2      Patient is a 85 y/o F with history of dementia being treated for volume depletion in the setting of hyporexia.  Assessment & Plan  Dehydration determined by examination  Index suspicion for overall hypovolemia contributing to weakness  Patient with poor oral intake in setting of dementia  Observe under hospitalist service  Lactic acidosis in absence of SIRS criteria - likely 2/2 volume depletion  Low index of suspicion for infection  COVID/flu negative  UA minimally abnormal - possible UTI/cystitis  Continue cefpodoxime  PT and OT recommendation  Level II at d/c  Encourage PO fluid intake  Continue Ensure supplement  Electrolyte replacement as appropriate  Continue dronabinol to increase appetite  HTN (hypertension)  Normotensive on presentation  Chronic condition/stable  Continue home medication regimen-Cozaar 25 mg p.o. daily  Diabetes mellitus, type 2 (HCC)  Lab Results   Component Value Date    HGBA1C 6.0 (H) 10/08/2024       Recent Labs     10/10/24  1608 10/10/24  2128 10/10/24  2259 10/11/24  0619   POCGLU 154* 105 120 124       Blood Sugar Average: Last 72 hrs:  (P) 112.2  HbA1c - 6.0  Without evidence of hyper or hypoglycemia  Held home medication regimen-empagliflozin 25 mg p.o. 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    Mobility:   Basic Mobility Inpatient Raw Score: 17  JH-HLM Goal: 5: Stand one or more mins  JH-HLM Achieved: 7: Walk 25 feet or more  JH-HLM Goal achieved. Continue to encourage appropriate mobility.    Patient Centered Rounds: I performed bedside rounds with nursing staff today.   Discussions with Specialists or Other Care Team Provider: None    Education and Discussions with Family / Patient: Attempted to update  (daughter) via phone. Left voicemail.     Current Length of Stay: 2 day(s)  Current Patient Status: Inpatient   Certification Statement: The patient will continue to require additional inpatient hospital stay due to treatment of volume depletion in the setting of hyporexia  Discharge Plan: Anticipate discharge in 48-72 hrs to home.    Code Status: Level 1 - Full Code    Subjective   No acute events occurred overnight, but the patient continues to refuse Masimo.   The patient does not report any pain and maintains a pleasant affect.   They have not eaten and only drinks a small amount when offered. Patient denies any hunger or thirst.  The patient is passes loose stools and is incontinent.   They occasionally walk with their walker around the room.     Objective :  Temp:  [97.7 °F (36.5 °C)-98.1 °F (36.7 °C)] 98.1 °F (36.7 °C)  HR:  [77-83] 83  BP: ()/(58-84) 133/76  Resp:  [16] 16  SpO2:  [96 %-98 %] 98 %  O2 Device: None (Room air)    Body mass index is 26.23 kg/m².     Input and Output Summary (last 24 hours):     Intake/Output Summary (Last 24 hours) at 10/11/2024 1026  Last data filed at 10/10/2024 1900  Gross per 24 hour   Intake 150 ml   Output --   Net 150 ml       Physical Exam  Constitutional:       General: She is not in acute distress.     Appearance: She is not toxic-appearing.   HENT:      Head: Normocephalic and atraumatic.   Eyes:      General: No scleral icterus.     Conjunctiva/sclera:  Conjunctivae normal.   Cardiovascular:      Rate and Rhythm: Normal rate and regular rhythm.      Pulses: Normal pulses.      Heart sounds: Normal heart sounds. No murmur heard.     No friction rub. No gallop.   Pulmonary:      Effort: Pulmonary effort is normal. No respiratory distress.      Breath sounds: Normal breath sounds.   Chest:      Chest wall: No tenderness.   Musculoskeletal:         General: No tenderness.      Right lower leg: No edema.      Left lower leg: No edema.   Neurological:      Mental Status: She is alert.      Comments: aao to self and place. Able to communicate well through written and gestural communication.         Lines/Drains:              Lab Results: I have reviewed the following results:   Results from last 7 days   Lab Units 10/10/24  0444   WBC Thousand/uL 7.48   HEMOGLOBIN g/dL 11.3*   HEMATOCRIT % 34.9   PLATELETS Thousands/uL 220   SEGS PCT % 72   LYMPHO PCT % 17   MONO PCT % 8   EOS PCT % 2     Results from last 7 days   Lab Units 10/10/24  0444   SODIUM mmol/L 136   POTASSIUM mmol/L 4.1   CHLORIDE mmol/L 103   CO2 mmol/L 26   BUN mg/dL 20   CREATININE mg/dL 0.60   ANION GAP mmol/L 7   CALCIUM mg/dL 8.0*   ALBUMIN g/dL 3.1*   TOTAL BILIRUBIN mg/dL 0.73   ALK PHOS U/L 74   ALT U/L 9   AST U/L 11*   GLUCOSE RANDOM mg/dL 91         Results from last 7 days   Lab Units 10/11/24  0619 10/10/24  2259 10/10/24  2128 10/10/24  1608 10/10/24  1041 10/10/24  0659 10/09/24  2033 10/09/24  1550 10/09/24  1037 10/09/24  0641   POC GLUCOSE mg/dl 124 120 105 154* 99 98 110 110 100 102     Results from last 7 days   Lab Units 10/08/24  1913   HEMOGLOBIN A1C % 6.0*     Results from last 7 days   Lab Units 10/08/24  2138 10/08/24  1913   LACTIC ACID mmol/L 3.4* 3.1*   PROCALCITONIN ng/ml  --  0.11       Recent Cultures (last 7 days):         Imaging Results Review: I reviewed radiology reports from this admission including: xray(s).  Other Study Results Review: EKG was reviewed.     Last 24  Hours Medication List:     Current Facility-Administered Medications:     acetaminophen (TYLENOL) tablet 650 mg, Q6H PRN    atorvastatin (LIPITOR) tablet 20 mg, HS    calcium carbonate (TUMS) chewable tablet 1,000 mg, Daily PRN    cefpodoxime (VANTIN) tablet 200 mg, BID With Meals    clopidogrel (PLAVIX) tablet 75 mg, Daily    colestipol (COLESTID) tablet 2 g, BID    dicyclomine (BENTYL) tablet 20 mg, Q6H    donepezil (ARICEPT) tablet 10 mg, HS    dronabinol (MARINOL) capsule 2.5 mg, BID AC    enoxaparin (LOVENOX) subcutaneous injection 40 mg, Daily    insulin lispro (HumALOG/ADMELOG) 100 units/mL subcutaneous injection 1-5 Units, TID AC **AND** Fingerstick Glucose (POCT), TID AC    insulin lispro (HumALOG/ADMELOG) 100 units/mL subcutaneous injection 1-5 Units, HS    losartan (COZAAR) tablet 25 mg, Daily    magnesium Oxide (MAG-OX) tablet 800 mg, BID    polyethylene glycol (MIRALAX) packet 17 g, Daily    senna (SENOKOT) tablet 8.6 mg, Daily    Administrative Statements   Today, Patient Was Seen By: Lisa Chatman    **Please Note: This note may have been constructed using a voice recognition system.**

## 2024-10-11 NOTE — ASSESSMENT & PLAN NOTE
Lab Results   Component Value Date    HGBA1C 6.0 (H) 10/08/2024       Recent Labs     10/10/24  1608 10/10/24  2128 10/10/24  2259 10/11/24  0619   POCGLU 154* 105 120 124       Blood Sugar Average: Last 72 hrs:  (P) 112.2  HbA1c - 6.0  Without evidence of hyper or hypoglycemia  Held home medication regimen-empagliflozin 25 mg p.o. daily  Continue sliding scale insulin coverage

## 2024-10-12 VITALS
HEART RATE: 80 BPM | WEIGHT: 129.85 LBS | RESPIRATION RATE: 16 BRPM | DIASTOLIC BLOOD PRESSURE: 59 MMHG | OXYGEN SATURATION: 96 % | BODY MASS INDEX: 26.18 KG/M2 | TEMPERATURE: 97.6 F | SYSTOLIC BLOOD PRESSURE: 88 MMHG | HEIGHT: 59 IN

## 2024-10-12 LAB
ALBUMIN SERPL BCG-MCNC: 3.7 G/DL (ref 3.5–5)
ALP SERPL-CCNC: 75 U/L (ref 34–104)
ALT SERPL W P-5'-P-CCNC: 10 U/L (ref 7–52)
ANION GAP SERPL CALCULATED.3IONS-SCNC: 9 MMOL/L (ref 4–13)
AST SERPL W P-5'-P-CCNC: 10 U/L (ref 13–39)
BACTERIA UR CULT: NORMAL
BACTERIA UR CULT: NORMAL
BASOPHILS # BLD AUTO: 0.03 THOUSANDS/ΜL (ref 0–0.1)
BASOPHILS NFR BLD AUTO: 0 % (ref 0–1)
BILIRUB SERPL-MCNC: 0.5 MG/DL (ref 0.2–1)
BUN SERPL-MCNC: 27 MG/DL (ref 5–25)
CALCIUM SERPL-MCNC: 8.7 MG/DL (ref 8.4–10.2)
CHLORIDE SERPL-SCNC: 103 MMOL/L (ref 96–108)
CO2 SERPL-SCNC: 25 MMOL/L (ref 21–32)
CREAT SERPL-MCNC: 0.66 MG/DL (ref 0.6–1.3)
EOSINOPHIL # BLD AUTO: 0.15 THOUSAND/ΜL (ref 0–0.61)
EOSINOPHIL NFR BLD AUTO: 2 % (ref 0–6)
ERYTHROCYTE [DISTWIDTH] IN BLOOD BY AUTOMATED COUNT: 14.9 % (ref 11.6–15.1)
GFR SERPL CREATININE-BSD FRML MDRD: 81 ML/MIN/1.73SQ M
GLUCOSE SERPL-MCNC: 100 MG/DL (ref 65–140)
GLUCOSE SERPL-MCNC: 109 MG/DL (ref 65–140)
GLUCOSE SERPL-MCNC: 112 MG/DL (ref 65–140)
HCT VFR BLD AUTO: 38.5 % (ref 34.8–46.1)
HGB BLD-MCNC: 12.5 G/DL (ref 11.5–15.4)
IMM GRANULOCYTES # BLD AUTO: 0.07 THOUSAND/UL (ref 0–0.2)
IMM GRANULOCYTES NFR BLD AUTO: 1 % (ref 0–2)
LYMPHOCYTES # BLD AUTO: 1.82 THOUSANDS/ΜL (ref 0.6–4.47)
LYMPHOCYTES NFR BLD AUTO: 19 % (ref 14–44)
MAGNESIUM SERPL-MCNC: 1.7 MG/DL (ref 1.9–2.7)
MCH RBC QN AUTO: 28.4 PG (ref 26.8–34.3)
MCHC RBC AUTO-ENTMCNC: 32.5 G/DL (ref 31.4–37.4)
MCV RBC AUTO: 88 FL (ref 82–98)
MONOCYTES # BLD AUTO: 0.81 THOUSAND/ΜL (ref 0.17–1.22)
MONOCYTES NFR BLD AUTO: 9 % (ref 4–12)
NEUTROPHILS # BLD AUTO: 6.66 THOUSANDS/ΜL (ref 1.85–7.62)
NEUTS SEG NFR BLD AUTO: 69 % (ref 43–75)
NRBC BLD AUTO-RTO: 0 /100 WBCS
PLATELET # BLD AUTO: 276 THOUSANDS/UL (ref 149–390)
PMV BLD AUTO: 9 FL (ref 8.9–12.7)
POTASSIUM SERPL-SCNC: 4.1 MMOL/L (ref 3.5–5.3)
PROT SERPL-MCNC: 6.6 G/DL (ref 6.4–8.4)
RBC # BLD AUTO: 4.4 MILLION/UL (ref 3.81–5.12)
SODIUM SERPL-SCNC: 137 MMOL/L (ref 135–147)
WBC # BLD AUTO: 9.54 THOUSAND/UL (ref 4.31–10.16)

## 2024-10-12 PROCEDURE — 99239 HOSP IP/OBS DSCHRG MGMT >30: CPT | Performed by: INTERNAL MEDICINE

## 2024-10-12 PROCEDURE — 80053 COMPREHEN METABOLIC PANEL: CPT | Performed by: INTERNAL MEDICINE

## 2024-10-12 PROCEDURE — 82948 REAGENT STRIP/BLOOD GLUCOSE: CPT

## 2024-10-12 PROCEDURE — 85025 COMPLETE CBC W/AUTO DIFF WBC: CPT | Performed by: INTERNAL MEDICINE

## 2024-10-12 PROCEDURE — 83735 ASSAY OF MAGNESIUM: CPT | Performed by: INTERNAL MEDICINE

## 2024-10-12 RX ORDER — DRONABINOL 5 MG/1
5 CAPSULE ORAL
Qty: 60 CAPSULE | Refills: 0 | Status: SHIPPED | OUTPATIENT
Start: 2024-10-12

## 2024-10-12 RX ORDER — CEFPODOXIME PROXETIL 200 MG/1
200 TABLET, FILM COATED ORAL 2 TIMES DAILY WITH MEALS
Qty: 6 TABLET | Refills: 0 | Status: SHIPPED | OUTPATIENT
Start: 2024-10-12 | End: 2024-10-15

## 2024-10-12 RX ORDER — MIDODRINE HYDROCHLORIDE 5 MG/1
5 TABLET ORAL
Status: DISCONTINUED | OUTPATIENT
Start: 2024-10-12 | End: 2024-10-12 | Stop reason: HOSPADM

## 2024-10-12 RX ADMIN — ENOXAPARIN SODIUM 40 MG: 40 INJECTION SUBCUTANEOUS at 10:07

## 2024-10-12 RX ADMIN — COLESTIPOL HYDROCHLORIDE 2 G: 1 TABLET, FILM COATED ORAL at 10:06

## 2024-10-12 RX ADMIN — DRONABINOL 2.5 MG: 2.5 CAPSULE ORAL at 10:04

## 2024-10-12 RX ADMIN — DICYCLOMINE HYDROCHLORIDE 20 MG: 20 TABLET ORAL at 00:24

## 2024-10-12 RX ADMIN — DICYCLOMINE HYDROCHLORIDE 20 MG: 20 TABLET ORAL at 06:10

## 2024-10-12 RX ADMIN — MIDODRINE HYDROCHLORIDE 5 MG: 5 TABLET ORAL at 11:31

## 2024-10-12 RX ADMIN — CEFPODOXIME PROXETIL 200 MG: 200 TABLET, FILM COATED ORAL at 10:04

## 2024-10-12 RX ADMIN — Medication 800 MG: at 10:04

## 2024-10-12 RX ADMIN — DICYCLOMINE HYDROCHLORIDE 20 MG: 20 TABLET ORAL at 11:31

## 2024-10-12 RX ADMIN — CLOPIDOGREL 75 MG: 75 TABLET ORAL at 10:04

## 2024-10-12 NOTE — PLAN OF CARE
Problem: METABOLIC, FLUID AND ELECTROLYTES - ADULT  Goal: Electrolytes maintained within normal limits  Description: INTERVENTIONS:  - Monitor labs and assess patient for signs and symptoms of electrolyte imbalances  - Administer electrolyte replacement as ordered  - Monitor response to electrolyte replacements, including repeat lab results as appropriate  - Instruct patient on fluid and nutrition as appropriate  Outcome: Progressing     Problem: METABOLIC, FLUID AND ELECTROLYTES - ADULT  Goal: Fluid balance maintained  Description: INTERVENTIONS:  - Monitor labs   - Monitor I/O and WT  - Instruct patient on fluid and nutrition as appropriate  - Assess for signs & symptoms of volume excess or deficit  Outcome: Progressing     Problem: METABOLIC, FLUID AND ELECTROLYTES - ADULT  Goal: Glucose maintained within target range  Description: INTERVENTIONS:  - Monitor Blood Glucose as ordered  - Assess for signs and symptoms of hyperglycemia and hypoglycemia  - Administer ordered medications to maintain glucose within target range  - Assess nutritional intake and initiate nutrition service referral as needed  Outcome: Progressing     Problem: PAIN - ADULT  Goal: Verbalizes/displays adequate comfort level or baseline comfort level  Description: Interventions:  - Encourage patient to monitor pain and request assistance  - Assess pain using appropriate pain scale  - Administer analgesics based on type and severity of pain and evaluate response  - Implement non-pharmacological measures as appropriate and evaluate response  - Consider cultural and social influences on pain and pain management  - Notify physician/advanced practitioner if interventions unsuccessful or patient reports new pain  Outcome: Progressing     Problem: DISCHARGE PLANNING  Goal: Discharge to home or other facility with appropriate resources  Description: INTERVENTIONS:  - Identify barriers to discharge w/patient and caregiver  - Arrange for needed  discharge resources and transportation as appropriate  - Identify discharge learning needs (meds, wound care, etc.)  - Arrange for interpretive services to assist at discharge as needed  - Refer to Case Management Department for coordinating discharge planning if the patient needs post-hospital services based on physician/advanced practitioner order or complex needs related to functional status, cognitive ability, or social support system  Outcome: Progressing     Problem: Knowledge Deficit  Goal: Patient/family/caregiver demonstrates understanding of disease process, treatment plan, medications, and discharge instructions  Description: Complete learning assessment and assess knowledge base.  Interventions:  - Provide teaching at level of understanding  - Provide teaching via preferred learning methods  Outcome: Progressing

## 2024-10-12 NOTE — DISCHARGE SUMMARY
Discharge Summary - Hospitalist   Name: Violet Dick 84 y.o. female I MRN: [id number]  Unit/Bed#: -01 I Date of Admission: 10/8/2024   Date of Service: 10/12/2024 I Hospital Day: 3        Discharge Diagnosis:    Volume depletion  Mechanical fall  Dementia  Failure to thrive  Questionable cystitis on imaging  Generalized weakness  Hypomagnesemia  Diabetes      Discharging Physician / Practitioner: Cory Cifuentes MD  PCP: Tono Gore  Admission Date:   Admission Orders (From admission, onward)       Ordered        10/09/24 1450  INPATIENT ADMISSION  Once            10/08/24 2255  Place in Observation  Once                          Discharge Date: 10/12/24          Significant Findings / Test Results:   Procedure Component Value Units Date/Time   XR foot 3+ views RIGHT [[id number]] Collected: 10/09/24 0724   Order Status: Completed Updated: 10/09/24 0728   Narrative:     XR ANKLE 3+ VW RIGHT, XR FOOT 3+ VW RIGHT    INDICATION: pain.    COMPARISON: None    FINDINGS:    No acute fracture or dislocation. Multiple accessory ossicles adjacent to the medial malleolus.    No significant degenerative changes.    No lytic or blastic osseous lesion.  Lateral soft tissue swelling.   Impression:       No acute osseous abnormality.      Computerized Assisted Algorithm (CAA) may have been used to analyze all applicable images.          Workstation performed: [id number]   XR ankle 3+ vw right [[id number]] Collected: 10/09/24 0724   Order Status: Completed Updated: 10/09/24 0728   Narrative:     XR ANKLE 3+ VW RIGHT, XR FOOT 3+ VW RIGHT    INDICATION: pain.    COMPARISON: None    FINDINGS:    No acute fracture or dislocation. Multiple accessory ossicles adjacent to the medial malleolus.    No significant degenerative changes.    No lytic or blastic osseous lesion.  Lateral soft tissue swelling.   Impression:       No acute osseous abnormality.      Computerized Assisted Algorithm (CAA) may have been used to  analyze all applicable images.          Workstation performed: [id number]   US CAROTID DUPLEX BILATERAL COMPLETE [[id number]] Collected: 10/02/24 1512   Order Status: Completed Updated: 10/08/24 1828   Narrative:     EXAM: Carotid ultrasound.    TECHNIQUE:  Real-time duplex evaluation of the bilateral carotid vertebral  arterial circulation was performed using color Doppler, spectral waveform  analysis and grayscale technique.    COMPARISON:  None available    HISTORY: Cerebrovascular disease    FINDINGS:    Peak systolic velocity measurements as follows (cm/s):    RIGHT:    Proximal CCA: 63.  Mid CCA: 63.  Distal CCA: 62.  Proximal ICA: 67.  Mid ICA: 78.  Distal ICA: 77.    ECA: 54.  Vertebral: 41 and antegrade.    ICA/CCA ratio: 1.2.    Mild shadowing plaque in the distal common carotid and carotid bulb.    LEFT:    Proximal CCA: 62.  Mid CCA: 61.  Distal CCA: 59.  Proximal ICA: 71.  Mid ICA: 57.  Distal ICA: 58.    ECA: 61.  Vertebral: 51 and antegrade.    ICA/CCA ratio: 1.2.    Mild shadowing plaque in the distal common carotid and carotid bulb.   Impression:     IMPRESSION:  1.  No evidence of hemodynamically significant stenosis.  2.  Mild bilateral carotid atherosclerosis.            Outpatient follow up Requested:  PCP    Complications:  None    Reason for Admission: Decreased appetite    HPI:  Violet Dick is a 84 y.o. female with a PMH of hypertension, hyperlipidemia, non-insulin-dependent diabetes, advanced age who presents with reports of mechanical fall last evening (10/7) with reports of decreased appetite, weakness, fatigue over the past several days.     Hospital Course:     The patient was hospitalized.  She was noted to be volume depleted  Jardiance was discontinued  Patient was provided IV fluids.  She did have some questionable cystitis on imaging but not significant symptoms except for urgency, she was started on p.o. antibiotics.  Will for course of cefpodoxime.  Urine culture just growing  "mixed contaminants less than 30,000 colonies.  On discussion with daughter it looks like patient does have worsening hyporexia, suspect due to worsening dementia patient does not have an appetite.  Patient was started on dronabinol but no significant improvement.  Discussed with family potential natural progression of disease.  Will continue a short course of p.o. antibiotics for any potential cystitis.  Continue dronabinol.  Family aware that there is a chance of worsening aggressive p.o. intake moving forward.  I did mention possibility of palliative care to think about it.  The patient wants to go home on 10/12, daughter also wants to take her home.  Patient is otherwise stable.  I would recommend staying off SGLT2 inhibitor to avoid any volume depletion at this time.  She should be encouraged to be.  She will follow-up with primary care physician.  Discharged in stable condition.      Condition at Discharge: fair     Discharge Day Visit / Exam:     Subjective:    Patient evaluated this morning.  Pleasantly confused  Looks happy  Denies any chest pain, nausea, vomiting  Vitals:   lood Pressure 116/72mmHg  Pulse: 80 (10/11/24 1521)  Temperature: 97.6 °F (36.4 °C) (10/12/24 0702)  Temp Source: Oral (10/11/24 1521)  Respirations: 16 (10/12/24 0702)  Height: 4' 11\" (149.9 cm) (10/09/24 0033)  Weight - Scale: 58.9 kg (129 lb 13.6 oz) (10/09/24 0033)  SpO2: 96 % (10/11/24 1521)  Exam:   Physical Exam  Vitals and nursing note reviewed.   Constitutional:       General: She is not in acute distress.     Appearance: Normal appearance. She is normal weight. She is not ill-appearing, toxic-appearing or diaphoretic.   HENT:      Head: Normocephalic and atraumatic.      Right Ear: External ear normal.      Left Ear: External ear normal.      Nose: Nose normal. No congestion.      Mouth/Throat:      Mouth: Mucous membranes are moist.      Pharynx: Oropharynx is clear. No oropharyngeal exudate or posterior oropharyngeal " erythema.   Eyes:      General: No scleral icterus.        Right eye: No discharge.         Left eye: No discharge.      Extraocular Movements: Extraocular movements intact.      Conjunctiva/sclera: Conjunctivae normal.      Pupils: Pupils are equal, round, and reactive to light.   Cardiovascular:      Rate and Rhythm: Normal rate and regular rhythm.      Pulses: Normal pulses.      Heart sounds: Normal heart sounds. No murmur heard.     No friction rub. No gallop.   Pulmonary:      Effort: Pulmonary effort is normal. No respiratory distress.      Breath sounds: Normal breath sounds. No stridor. No wheezing, rhonchi or rales.   Chest:      Chest wall: No tenderness.   Abdominal:      General: Abdomen is flat. Bowel sounds are normal. There is no distension.      Palpations: Abdomen is soft. There is no mass.      Tenderness: There is no abdominal tenderness. There is no guarding or rebound.   Musculoskeletal:         General: No swelling, tenderness, deformity or signs of injury. Normal range of motion.      Cervical back: Normal range of motion and neck supple. No rigidity. No muscular tenderness.   Skin:     General: Skin is warm and dry.      Capillary Refill: Capillary refill takes less than 2 seconds.      Coloration: Skin is not jaundiced or pale.      Findings: No bruising, erythema, lesion or rash.   Neurological:      General: No focal deficit present.      Mental Status: She is alert and oriented to person, place, and time. Mental status is at baseline.      Cranial Nerves: No cranial nerve deficit.      Sensory: No sensory deficit.      Motor: No weakness.      Coordination: Coordination normal.   Psychiatric:         Mood and Affect: Mood normal.         Behavior: Behavior normal.         Thought Content: Thought content normal.         Judgment: Judgment normal.       Discussion with Family: Daughter at bedside    Discharge instructions/Information to patient and family:   See after visit summary for  information provided to patient and family.      Provisions for Follow-Up Care:  See after visit summary for information related to follow-up care and any pertinent home health orders.      Disposition:     Home    For Discharges to [facility]:   Not Applicable to this Patient - Not Applicable to this Patient    Planned Readmission: No     Discharge Statement:  I spent 76 minutes discharging the patient. This time was spent on the day of discharge. I had direct contact with the patient on the day of discharge. Greater than 50% of the total time was spent examining patient, answering all patient questions, arranging and discussing plan of care with patient as well as directly providing post-discharge instructions.  Additional time then spent on discharge activities.    Discharge Medications:  See after visit summary for reconciled discharge medications provided to patient and family.      ** Please Note: This note has been constructed using a voice recognition system **

## 2024-10-12 NOTE — PLAN OF CARE
Problem: METABOLIC, FLUID AND ELECTROLYTES - ADULT  Goal: Electrolytes maintained within normal limits  Description: INTERVENTIONS:  - Monitor labs and assess patient for signs and symptoms of electrolyte imbalances  - Administer electrolyte replacement as ordered  - Monitor response to electrolyte replacements, including repeat lab results as appropriate  - Instruct patient on fluid and nutrition as appropriate  Outcome: Adequate for Discharge  Goal: Fluid balance maintained  Description: INTERVENTIONS:  - Monitor labs   - Monitor I/O and WT  - Instruct patient on fluid and nutrition as appropriate  - Assess for signs & symptoms of volume excess or deficit  Outcome: Adequate for Discharge  Goal: Glucose maintained within target range  Description: INTERVENTIONS:  - Monitor Blood Glucose as ordered  - Assess for signs and symptoms of hyperglycemia and hypoglycemia  - Administer ordered medications to maintain glucose within target range  - Assess nutritional intake and initiate nutrition service referral as needed  Outcome: Adequate for Discharge     Problem: SKIN/TISSUE INTEGRITY - ADULT  Goal: Skin Integrity remains intact(Skin Breakdown Prevention)  Description: Assess:  -Perform Kavon assessment every   -Clean and moisturize skin every   -Inspect skin when repositioning, toileting, and assisting with ADLS  -Assess under medical devices such as  every   -Assess extremities for adequate circulation and sensation     Bed Management:  -Have minimal linens on bed & keep smooth, unwrinkled  -Change linens as needed when moist or perspiring  -Avoid sitting or lying in one position for more than  hours while in bed  -Keep HOB at degrees     Toileting:  -Offer bedside commode  -Assess for incontinence every   -Use incontinent care products after each incontinent episode such as     Activity:  -Mobilize patient  times a day  -Encourage activity and walks on unit  -Encourage or provide ROM exercises   -Turn and  reposition patient every  Hours  -Use appropriate equipment to lift or move patient in bed  -Instruct/ Assist with weight shifting every  when out of bed in chair  -Consider limitation of chair time  hour intervals    Skin Care:  -Avoid use of baby powder, tape, friction and shearing, hot water or constrictive clothing  -Relieve pressure over bony prominences using   -Do not massage red bony areas    Next Steps:  -Teach patient strategies to minimize risks such as    -Consider consults to  interdisciplinary teams such as   Outcome: Adequate for Discharge  Goal: Incision(s), wounds(s) or drain site(s) healing without S/S of infection  Description: INTERVENTIONS  - Assess and document dressing, incision, wound bed, drain sites and surrounding tissue  - Provide patient and family education  - Perform skin care/dressing changes every   Outcome: Adequate for Discharge  Goal: Pressure injury heals and does not worsen  Description: Interventions:  - Implement low air loss mattress or specialty surface (Criteria met)  - Apply silicone foam dressing  - Instruct/assist with weight shifting every  minutes when in chair   - Limit chair time to  hour intervals  - Use special pressure reducing interventions such as  when in chair   - Apply fecal or urinary incontinence containment device   - Perform passive or active ROM every   - Turn and reposition patient & offload bony prominences every  hours   - Utilize friction reducing device or surface for transfers   - Consider consults to  interdisciplinary teams such as   - Use incontinent care products after each incontinent episode such as   - Consider nutrition services referral as needed  Outcome: Adequate for Discharge     Problem: MUSCULOSKELETAL - ADULT  Goal: Maintain or return mobility to safest level of function  Description: INTERVENTIONS:  - Assess patient's ability to carry out ADLs; assess patient's baseline for ADL function and identify physical deficits which impact  ability to perform ADLs (bathing, care of mouth/teeth, toileting, grooming, dressing, etc.)  - Assess/evaluate cause of self-care deficits   - Assess range of motion  - Assess patient's mobility  - Assess patient's need for assistive devices and provide as appropriate  - Encourage maximum independence but intervene and supervise when necessary  - Involve family in performance of ADLs  - Assess for home care needs following discharge   - Consider OT consult to assist with ADL evaluation and planning for discharge  - Provide patient education as appropriate  Outcome: Adequate for Discharge  Goal: Maintain proper alignment of affected body part  Description: INTERVENTIONS:  - Support, maintain and protect limb and body alignment  - Provide patient/ family with appropriate education  Outcome: Adequate for Discharge     Problem: PAIN - ADULT  Goal: Verbalizes/displays adequate comfort level or baseline comfort level  Description: Interventions:  - Encourage patient to monitor pain and request assistance  - Assess pain using appropriate pain scale  - Administer analgesics based on type and severity of pain and evaluate response  - Implement non-pharmacological measures as appropriate and evaluate response  - Consider cultural and social influences on pain and pain management  - Notify physician/advanced practitioner if interventions unsuccessful or patient reports new pain  Outcome: Adequate for Discharge     Problem: SAFETY ADULT  Goal: Patient will remain free of falls  Description: INTERVENTIONS:  - Educate patient/family on patient safety including physical limitations  - Instruct patient to call for assistance with activity   - Consult OT/PT to assist with strengthening/mobility   - Keep Call bell within reach  - Keep bed low and locked with side rails adjusted as appropriate  - Keep care items and personal belongings within reach  - Initiate and maintain comfort rounds  - Make Fall Risk Sign visible to staff  -  Offer Toileting every  Hours, in advance of need  - Initiate/Maintain alarm  - Obtain necessary fall risk management equipment:   - Apply yellow socks and bracelet for high fall risk patients  - Consider moving patient to room near nurses station  Outcome: Adequate for Discharge  Goal: Maintain or return to baseline ADL function  Description: INTERVENTIONS:  -  Assess patient's ability to carry out ADLs; assess patient's baseline for ADL function and identify physical deficits which impact ability to perform ADLs (bathing, care of mouth/teeth, toileting, grooming, dressing, etc.)  - Assess/evaluate cause of self-care deficits   - Assess range of motion  - Assess patient's mobility; develop plan if impaired  - Assess patient's need for assistive devices and provide as appropriate  - Encourage maximum independence but intervene and supervise when necessary  - Involve family in performance of ADLs  - Assess for home care needs following discharge   - Consider OT consult to assist with ADL evaluation and planning for discharge  - Provide patient education as appropriate  Outcome: Adequate for Discharge  Goal: Maintains/Returns to pre admission functional level  Description: INTERVENTIONS:  - Perform AM-PAC 6 Click Basic Mobility/ Daily Activity assessment daily.  - Set and communicate daily mobility goal to care team and patient/family/caregiver.   - Collaborate with rehabilitation services on mobility goals if consulted  - Perform Range of Motion  times a day.  - Reposition patient every  hours.  - Dangle patient  times a day  - Stand patient  times a day  - Ambulate patient  times a day  - Out of bed to chair  times a day   - Out of bed for meals  times a day  - Out of bed for toileting  - Record patient progress and toleration of activity level   Outcome: Adequate for Discharge     Problem: DISCHARGE PLANNING  Goal: Discharge to home or other facility with appropriate resources  Description: INTERVENTIONS:  -  Identify barriers to discharge w/patient and caregiver  - Arrange for needed discharge resources and transportation as appropriate  - Identify discharge learning needs (meds, wound care, etc.)  - Arrange for interpretive services to assist at discharge as needed  - Refer to Case Management Department for coordinating discharge planning if the patient needs post-hospital services based on physician/advanced practitioner order or complex needs related to functional status, cognitive ability, or social support system  Outcome: Adequate for Discharge     Problem: Knowledge Deficit  Goal: Patient/family/caregiver demonstrates understanding of disease process, treatment plan, medications, and discharge instructions  Description: Complete learning assessment and assess knowledge base.  Interventions:  - Provide teaching at level of understanding  - Provide teaching via preferred learning methods  Outcome: Adequate for Discharge     Problem: Prexisting or High Potential for Compromised Skin Integrity  Goal: Skin integrity is maintained or improved  Description: INTERVENTIONS:  - Identify patients at risk for skin breakdown  - Assess and monitor skin integrity  - Assess and monitor nutrition and hydration status  - Monitor labs   - Assess for incontinence   - Turn and reposition patient  - Assist with mobility/ambulation  - Relieve pressure over bony prominences  - Avoid friction and shearing  - Provide appropriate hygiene as needed including keeping skin clean and dry  - Evaluate need for skin moisturizer/barrier cream  - Collaborate with interdisciplinary team   - Patient/family teaching  - Consider wound care consult   Outcome: Adequate for Discharge     Problem: PHYSICAL THERAPY ADULT  Goal: Performs mobility at highest level of function for planned discharge setting.  See evaluation for individualized goals.  Description: Treatment/Interventions: Functional transfer training, LE strengthening/ROM, Therapeutic exercise,  Endurance training, Patient/family training, Equipment eval/education, Bed mobility, Gait training, Spoke to nursing  Equipment Recommended: Walker (RW)       See flowsheet documentation for full assessment, interventions and recommendations.  Outcome: Adequate for Discharge     Problem: OCCUPATIONAL THERAPY ADULT  Goal: Performs self-care activities at highest level of function for planned discharge setting.  See evaluation for individualized goals.  Description:  Outcome: Adequate for Discharge     Problem: Nutrition/Hydration-ADULT  Goal: Nutrient/Hydration intake appropriate for improving, restoring or maintaining nutritional needs  Description: Monitor and assess patient's nutrition/hydration status for malnutrition. Collaborate with interdisciplinary team and initiate plan and interventions as ordered.  Monitor patient's weight and dietary intake as ordered or per policy. Utilize nutrition screening tool and intervene as necessary. Determine patient's food preferences and provide high-protein, high-caloric foods as appropriate.     INTERVENTIONS:  - Monitor oral intake, urinary output, labs, and treatment plans  - Assess nutrition and hydration status and recommend course of action  - Evaluate amount of meals eaten  - Assist patient with eating if necessary   - Allow adequate time for meals  - Recommend/ encourage appropriate diets, oral nutritional supplements, and vitamin/mineral supplements  - Order, calculate, and assess calorie counts as needed  - Recommend, monitor, and adjust tube feedings and TPN/PPN based on assessed needs  - Assess need for intravenous fluids  - Provide specific nutrition/hydration education as appropriate  - Include patient/family/caregiver in decisions related to nutrition  Outcome: Adequate for Discharge

## 2024-10-15 ENCOUNTER — CONSULT (OUTPATIENT)
Dept: SURGERY | Facility: CLINIC | Age: 84
End: 2024-10-15
Payer: MEDICARE

## 2024-10-15 VITALS
WEIGHT: 128.8 LBS | RESPIRATION RATE: 16 BRPM | OXYGEN SATURATION: 95 % | SYSTOLIC BLOOD PRESSURE: 101 MMHG | TEMPERATURE: 97.5 F | BODY MASS INDEX: 25.96 KG/M2 | DIASTOLIC BLOOD PRESSURE: 68 MMHG | HEART RATE: 106 BPM | HEIGHT: 59 IN

## 2024-10-15 DIAGNOSIS — K80.20 CALCULUS OF GALLBLADDER WITHOUT CHOLECYSTITIS WITHOUT OBSTRUCTION: ICD-10-CM

## 2024-10-15 PROCEDURE — 99204 OFFICE O/P NEW MOD 45 MIN: CPT | Performed by: SURGERY

## 2024-10-15 NOTE — PROGRESS NOTES
Consult- General Surgery   Violet Dick 84 y.o. female MRN: [id number]  Unit/Bed#:  Encounter: [id number]    Assessment & Plan     Assessment:  Asymptomatic gallstones  History of cervical cancer, status post chemoradiation 10 years ago  History of diabetes, stable  History of diverticulitis, stable  History of hard of hearing  History of hypertension, stable  History of dementia  Plan:  Daughter stated that patient has been declining for the past 3 months requiring hospitalization at least 3 times.  She was also found to have gallstones but apparently patient is completely asymptomatic and CT scan did not show any evidence of acute or chronic process from the gallbladder.  At this point I advised observation and gave daughter specific instructions to bring her to the emergency room with any symptoms of acute cholecystitis.  Daughter is in complete agreement.    History of Present Illness     HPI:  Violet Dick is a 84 y.o. female who presents to my office accompanied by her daughter for evaluation of gallstones.  All the history was obtained from daughter since the patient has advanced dementia and she is not able to express her symptoms.  Daughter stated that she has been declining in her mental health and overall medical condition for the past 3 months, requiring at least 3 hospitalizations.  During the workup she was found to have gallstones on CAT scan, but did not show any evidence of acute or chronic inflammatory process.  Patient at the present time denies having any abdominal pain, nausea, vomiting, chills, fever, change in color urine or stool.  Patient also has a history of uterine cancer, status post hysterectomy, chemotherapy and radiation, patient had a residual colon obstruction postradiation the most likely the cause of her diarrhea.    Review of Systems  The rest of the review of system total of 10 were negative except for the HPI.    Historical Information   Past Medical History:   Diagnosis  "Date    Cancer (HCC)     cervical 10 years ago- chemo radiation    Diabetes mellitus (HCC)     Diverticulitis     [place] (hard of hearing)     Hypertension      Past Surgical History:   Procedure Laterality Date    CATARACT EXTRACTION Bilateral     COLONOSCOPY      ELBOW SURGERY Right     HYSTERECTOMY      partial    TOE SURGERY Right     Daughter unsure side     Social History   Social History     Substance and Sexual Activity   Alcohol Use Never     Social History     Substance and Sexual Activity   Drug Use Never     Social History     Tobacco Use   Smoking Status Never    Passive exposure: Never   Smokeless Tobacco Never     Family History: Family history non-contributory    Meds/Allergies   all medications and allergies reviewed     Current Outpatient Medications:     atorvastatin (LIPITOR) 20 mg tablet, Take 20 mg by mouth daily at bedtime, Disp: , Rfl:     cefpodoxime (VANTIN) 200 mg tablet, Take 1 tablet (200 mg total) by mouth 2 (two) times a day with meals for 3 days, Disp: 6 tablet, Rfl: 0    clopidogrel (PLAVIX) 75 mg tablet, Take 75 mg by mouth daily, Disp: , Rfl:     colestipol (COLESTID) 1 g tablet, Take 2 tablets (2 g total) by mouth 2 (two) times a day, Disp: 120 tablet, Rfl: 3    dicyclomine (BENTYL) 20 mg tablet, Take 1 tablet (20 mg total) by mouth every 6 (six) hours, Disp: 120 tablet, Rfl: 2    donepezil (ARICEPT) 10 mg tablet, take 1 tablet by mouth every day at night, Disp: , Rfl:     dronabinol (MARINOL) 5 MG capsule, Take 1 capsule (5 mg total) by mouth 2 (two) times a day before meals, Disp: 60 capsule, Rfl: 0    losartan (COZAAR) 25 mg tablet, Take 25 mg by mouth daily After dinner, Disp: , Rfl:     PSYLLIUM HUSK PO, Take by mouth, Disp: , Rfl:   No Known Allergies    Objective     Current Vitals:   Blood Pressure: 101/68 (10/15/24 1516)  Pulse: (!) 106 (10/15/24 1516)  Temperature: 97.5 °F (36.4 °C) (10/15/24 1516)  Respirations: 16 (10/15/24 1516)  Height: 4' 11\" (149.9 cm) (10/15/24 " 1516)  Weight - Scale: 58.4 kg (128 lb 12.8 oz) (10/15/24 1516)  SpO2: 95 % (10/15/24 1516)    Physical Exam  Vitals and nursing note reviewed.   Constitutional:       General: She is not in acute distress.  Cardiovascular:      Rate and Rhythm: Normal rate and regular rhythm.   Pulmonary:      Effort: No respiratory distress.      Breath sounds: Normal breath sounds.   Abdominal:      Palpations: Abdomen is soft. There is no mass.      Tenderness: There is no abdominal tenderness.   Skin:     General: Skin is warm.      Coloration: Skin is not jaundiced.      Findings: No erythema or rash.   Neurological:      Mental Status: She is alert and oriented to person, place, and time.      Cranial Nerves: No cranial nerve deficit.   Psychiatric:         Mood and Affect: Mood normal.         Behavior: Behavior normal.       Imaging: Results Review Statement: I personally reviewed the following image studies in PACS and associated radiology reports: CT abdomen/pelvis. My interpretation of the radiology images/reports is: Agree with CT scan report..  Procedure: XR foot 3+ views RIGHT    Result Date: 10/9/2024  Narrative: XR ANKLE 3+ VW RIGHT, XR FOOT 3+ VW RIGHT INDICATION: pain. COMPARISON: None FINDINGS: No acute fracture or dislocation. Multiple accessory ossicles adjacent to the medial malleolus. No significant degenerative changes. No lytic or blastic osseous lesion. Lateral soft tissue swelling.     Impression: No acute osseous abnormality. Computerized Assisted Algorithm (CAA) may have been used to analyze all applicable images. Workstation performed: [id number]     Procedure: XR ankle 3+ vw right    Result Date: 10/9/2024  Narrative: XR ANKLE 3+ VW RIGHT, XR FOOT 3+ VW RIGHT INDICATION: pain. COMPARISON: None FINDINGS: No acute fracture or dislocation. Multiple accessory ossicles adjacent to the medial malleolus. No significant degenerative changes. No lytic or blastic osseous lesion. Lateral soft tissue swelling.      Impression: No acute osseous abnormality. Computerized Assisted Algorithm (CAA) may have been used to analyze all applicable images. Workstation performed: [id number]     Procedure: US CAROTID DUPLEX BILATERAL COMPLETE    Result Date: 10/2/2024  Narrative: EXAM: Carotid ultrasound. TECHNIQUE:  Real-time duplex evaluation of the bilateral carotid vertebral arterial circulation was performed using color Doppler, spectral waveform analysis and grayscale technique. COMPARISON:  None available HISTORY: Cerebrovascular disease FINDINGS: Peak systolic velocity measurements as follows (cm/s): RIGHT: Proximal CCA: 63. Mid CCA: 63. Distal CCA: 62. Proximal ICA: 67. Mid ICA: 78. Distal ICA: 77.   ECA: 54. Vertebral: 41 and antegrade. ICA/CCA ratio: 1.2. Mild shadowing plaque in the distal common carotid and carotid bulb. LEFT: Proximal CCA: 62. Mid CCA: 61. Distal CCA: 59. Proximal ICA: 71. Mid ICA: 57. Distal ICA: 58.   ECA: 61. Vertebral: 51 and antegrade. ICA/CCA ratio: 1.2. Mild shadowing plaque in the distal common carotid and carotid bulb.    Impression: IMPRESSION: 1.  No evidence of hemodynamically significant stenosis. 2.  Mild bilateral carotid atherosclerosis. Workstation:[id number]    Procedure: CT small bowel enterography    Result Date: 9/23/2024  Narrative: CT ABDOMEN AND PELVIS WITH IV CONTRAST- ENTEROGRAPHY - WITH CONTRAST INDICATION: R19.7: Diarrhea, unspecified. COMPARISON: CT abdomen pelvis 9/1/2024. TECHNIQUE: Contrast-enhanced CT examination of the abdomen and pelvis was performed utilizing thin section technique and after the administration of low density enteric contrast according to protocol designed specifically to obtain sensitive evaluation of the small bowel. Multiplanar 2D reformatted images were created from the source data. Radiation dose length product (DLP) for this visit: 661 mGy-cm . This examination, like all CT scans performed in the [facility], was performed utilizing  techniques to minimize radiation dose exposure, including the use of iterative reconstruction and automated exposure control. IV Contrast: 100 mL of iohexol (OMNIPAQUE) Enteric Contrast: 1350 cc of low density enteric contrast (Breeza) was administered. FINDINGS: ABDOMEN ENTEROGRAPHY: - Small bowel distension: Adequate. - Bowel: Mild circumferential wall thickening and mucosal enhancement involving the recto sigmoid colon. Colonic diverticulosis without evidence of acute diverticulitis. No small bowel wall thickening. - Mesenteric findings: None. - Extraintestinal findings: Cholelithiasis. REMAINDER OF THE ABDOMEN AND PELVIS: LOWER CHEST: No clinically significant abnormality in the visualized lower chest. LIVER/BILIARY TREE: Unremarkable. GALLBLADDER: Cholelithiasis without findings of acute cholecystitis. SPLEEN: Unremarkable. PANCREAS: Unremarkable. ADRENAL GLANDS: Unremarkable. KIDNEYS/URETERS: No hydronephrosis or urinary tract calculi. Subcentimeter hypoattenuating renal lesion(s), too small to characterize but statistically likely benign, which do not warrant follow-up (Radiology June 2019). APPENDIX: No findings to suggest appendicitis. ABDOMINOPELVIC CAVITY: No ascites. No pneumoperitoneum. No lymphadenopathy. 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 Diffuse atherosclerosis. PELVIS REPRODUCTIVE ORGANS: Unremarkable for patient's age. URINARY BLADDER: Mild bladder wall thickening though under distended. ABDOMINAL WALL/INGUINAL REGIONS: Unchanged sebaceous cyst in the left lower quadrant. BONES: No acute fracture or suspicious osseous lesion. Degenerative changes in the spine. Unchanged anterolisthesis of L4 on L5 where there is severe disc degeneration.     Impression: 1.  Mild findings of proctocolitis. Normal small bowel. 2.  Bladder wall thickening. Correlate for evidence of cystitis. Workstation performed: [id number]     Procedure: ECHO 2D COMPLETE    Result Date:  9/16/2024  Narrative:   Left Ventricle: Systolic function is normal with an ejection fraction of 61-65%. There is grade I (mild) diastolic dysfunction.   Aortic Valve: There is mild regurgitation. No intracardiac thrombus or mass seen. Left Ventricle Left ventricle is normal in size. Wall thickness is normal. Systolic function is normal with an ejection fraction of 61-65%. Wall motion is within normal limits. There is grade I (mild) diastolic dysfunction. Right Ventricle Right ventricle cavity is normal. Systolic function is normal. Left Atrium Left atrium cavity size is normal. Right Atrium Right atrium cavity is normal. IVC/SVC The inferior vena cava demonstrates a diameter of <=21 mm and collapses >50%; therefore, the right atrial pressure is estimated at 0-5 mmHg. Mitral Valve Mitral valve structure is normal. There is trace regurgitation. There is no evidence of mitral valve stenosis. Tricuspid Valve Tricuspid valve structure is normal. There is trace regurgitation. There is no evidence of tricuspid valve stenosis. Aortic Valve The aortic valve is trileaflet. The leaflets are mildly calcified. There is mild regurgitation. There is no evidence of aortic valve stenosis. Pulmonic Valve Pulmonic valve structure is normal. There is no regurgitation or stenosis. Ascending Aorta The aorta appears normal in size. Pericardium There is no pericardial effusion. Study Details A complete 2D echocardiogram was performed. Color flow, Pulse Wave and Continuous Wave Doppler was performed and analyzed.Overall the study quality was adequate. The study was difficult due to patient's body habitus.    Procedure: Holter monitor    Result Date: 9/15/2024  Narrative:                        [address] [address]                                        Test Date:    2024-09-13 Pat Name:     MARIPOSA DOUGLAS           Department:   Patient ID:    [id number]                 Room:         Gender:       Female                   Technician:   KATHY SMITH :          1940               Requested By: PATTIE CHING Order Number: [id number]                Reading MD:   Luis Oswald                            Interpretive Statements HOLTER MONITOR REPORT PATIENT NAME: MARIPOSA DOUGLAS   MRN:  [id number] ENCOUNTER:  [id number] :  1940 REFERRING PHYSICIAN:  PATTIE CHING STUDY DATE:  2024 _____________________________________________________________________________ [x]The patient did not maintain a diary. []The patient maintained a diary with symptoms of:                                []Dizziness                                []Shortness of breath                                []Chest pain                                []Palpitations                                []Lightheadedness                                []Left arm pain                                []Tiredness                                []Chest pressure ** PATIENT REMOVED MONITOR 1 HOUR EARLY** The technician-generated rhythm strips were analyzed for interpretation.  The maximum heart rate was 152 BPM at 9:51:30 AM, mean heart rate 74 BPM, and minimum heart rate 48 BPM at 3:42:51 AM. VENTRICULAR ECTOPIC BEATS: PVCs:  1  (0.0%) Couplets:  0 Events Triplets:  0 Events Ventricular Runs:  0 runs total Longest Run: 0 Fastest Run: 0 BPM Pauses: Longest R-R interval:  1.3 sec at 3:42:50 AM Drop/Late beats:  0/11 SUPRAVENTRICULAR ECTOPIC BEATS: PACs:  17  (0.0%) Atrial Pairs:0 Events Atrial Run:  0 runs total Longest Run: 0 Fastest Run: 0 BPM Afib: 0  (0.0%) total beats with a duration of 0.0 min _____________________________________________________________________________ IMPRESSION:   Normal sinus rhythm, rare PACs and rare PVCs. Electronically Signed On 9- 15:53:18 EDT by Luis Oswald    Procedure: XR Trauma chest portable    Result Date: 2024  Narrative: XR CHEST PORTABLE  INDICATION: TRAUMA. Fell from standing. COMPARISON: 7/6/2024 CT. FINDINGS: Lung volumes are within normal limits for technique. Lungs are clear. No effusion or pneumothorax. Heart, mediastinal and hilar structures are within normal limits. No acute osseous or soft tissue pathology.     Impression: No acute cardiopulmonary findings. Workstation performed: [id number]     Procedure: TRAUMA - CT head wo contrast    Result Date: 9/9/2024  Narrative: CT BRAIN - WITHOUT CONTRAST INDICATION:   TRAUMA. COMPARISON: 9/9/2024. TECHNIQUE:  CT examination of the brain was performed.  Multiplanar 2D reformatted images were created from the source data. Radiation dose length product (DLP) for this visit:  766 mGy-cm .  This examination, like all CT scans performed in the [facility], was performed utilizing techniques to minimize radiation dose exposure, including the use of iterative reconstruction and automated exposure control. IMAGE QUALITY:  Diagnostic. FINDINGS: PARENCHYMA: Decreased attenuation is noted in periventricular and subcortical white matter demonstrating an appearance that is statistically most likely to represent chronic microangiopathic change; this appearance is similar when compared to most recent  prior examination. No CT signs of acute infarction.  No intracranial mass, mass effect or midline shift.  No acute parenchymal hemorrhage. VENTRICLES AND EXTRA-AXIAL SPACES: Ventricles and extra-axial CSF spaces are prominent commensurate with the degree of volume loss.  No hydrocephalus.  No acute extra-axial hemorrhage. VISUALIZED ORBITS: Normal visualized orbits. PARANASAL SINUSES: Trace mucosal thickening of bilateral maxillary sinuses. CALVARIUM AND EXTRACRANIAL SOFT TISSUES: Mild right posterior parietal scalp swelling at the vertex. No calvarial fracture.     Impression: No acute intracranial abnormality. The study was marked in EPIC for immediate notification. Workstation performed: [id number]      Procedure: TRAUMA - CT spine cervical wo contrast    Result Date: 9/9/2024  Narrative: CT CERVICAL SPINE - WITHOUT CONTRAST INDICATION:   TRAUMA. COMPARISON: 7/6/2024. TECHNIQUE:  CT examination of the cervical spine was performed without intravenous contrast.  Contiguous axial images were obtained. Multiplanar 2D reformatted images were created from the source data. Radiation dose length product (DLP) for this visit:  381 mGy-cm .  This examination, like all CT scans performed in the [facility], was performed utilizing techniques to minimize radiation dose exposure, including the use of iterative reconstruction and automated exposure control. IMAGE QUALITY:  Diagnostic. FINDINGS: ALIGNMENT: There is straightening of normal cervical lordosis.  No subluxation or compression deformity. VERTEBRAE:  No fracture. DEGENERATIVE CHANGES: Moderate multilevel cervical degenerative changes are noted. No critical central canal stenosis. PREVERTEBRAL AND PARASPINAL SOFT TISSUES: Unremarkable THORACIC INLET:  Normal.     Impression: No cervical spine fracture or traumatic malalignment. The study was marked in EPIC for immediate notification. Workstation performed: [id number]     Procedure: MRI brain wo contrast    Result Date: 9/6/2024  Narrative: History: Mental status change, memory loss, possible dementia. Technique: Unenhanced MRI of the brain was performed with sagittal T1, axial T1, axial T2, axial susceptibility weighted, axial FLAIR and axial diffusion weighted images. Comparison: None available at time of dictation. Findings: Brain parenchyma: No restricted diffusion to suggest acute infarct. There is parenchymal volume loss. There is background of mild/moderate T2/FLAIR hyperintensity within the periventricular, pontine and subcortical white matter, which is ultimately nonspecific commonly seen with chronic microvascular ischemic change. Ventricular system, basal cisterns and extra-axial spaces:  Appropriate for age. Major vascular structures:Major arterial flow voids at the skull base are patent.. Intracranial hemorrhage: None. Midline shift: None. Skull base & Calvarium: Normal. Paranasal sinuses and mastoid air cells: Mild scattered mucosal thickening of the paranasal sinuses. Small amount of fluid seen within the bilateral mastoids.. Visualized orbits: Bilateral lens extraction. Sella: Incidental partially empty sella.    Impression: Impression: 1.  No acute intracranial findings: No acute infarct or intracranial hemorrhage. 2.  Mild/moderate white matter changes, which are ultimately nonspecific commonly seen with chronic microvascular ischemic change. 3.  Cerebral atrophy Workstation:[id number]    Procedure: CT abdomen pelvis wo contrast    Result Date: 9/1/2024  Narrative: CT ABDOMEN AND PELVIS WITHOUT IV CONTRAST INDICATION: diarrhea, h/o diverticulitis. COMPARISON: None. TECHNIQUE: CT examination of the abdomen and pelvis was performed without intravenous contrast. Multiplanar 2D reformatted images were created from the source data. This examination, like all CT scans performed in the [facility], was performed utilizing techniques to minimize radiation dose exposure, including the use of iterative reconstruction and automated exposure control. Radiation dose length product (DLP) for this visit: 635 mGy-cm Enteric Contrast: Not administered. FINDINGS: ABDOMEN LOWER CHEST: No clinically significant abnormality in the visualized lower chest. LIVER/BILIARY TREE: Unremarkable. GALLBLADDER: Cholelithiasis without findings of acute cholecystitis. SPLEEN: Unremarkable. PANCREAS: Unremarkable. ADRENAL GLANDS: Unremarkable. KIDNEYS/URETERS: Unremarkable. No hydronephrosis. STOMACH AND BOWEL: Colonic diverticulosis without findings of acute diverticulitis.. Mild pericolonic inflammatory change with liquid stool at the rectosigmoid colon. APPENDIX: No findings to suggest appendicitis.  ABDOMINOPELVIC CAVITY: No ascites. No pneumoperitoneum. No lymphadenopathy. VESSELS: Atherosclerosis without abdominal aortic aneurysm. PELVIS REPRODUCTIVE ORGANS: Post hysterectomy. URINARY BLADDER: Unremarkable. ABDOMINAL WALL/INGUINAL REGIONS: Unremarkable. BONES: No acute fracture or suspicious osseous lesion.     Impression: Findings consistent with proctocolitis Colonic diverticulosis Workstation performed: [id number]

## 2024-10-18 ENCOUNTER — TELEPHONE (OUTPATIENT)
Age: 84
End: 2024-10-18

## 2024-10-18 NOTE — TELEPHONE ENCOUNTER
Patient's daughter, Marsha, cme to  the SIBO Test. Patient grady was released from hospital s currently not eating, drinks Ensure for her nutrients, sleeps most of the day. Should she still do the test.

## 2024-10-27 NOTE — ED PROVIDER NOTES
Time reflects when diagnosis was documented in both MDM as applicable and the Disposition within this note       Time User Action Codes Description Comment    9/9/2024  1:58 AM Gail Crawford [W19.XXXA] Fall, initial encounter     9/9/2024  1:58 AM Gail Crawford [S09.90XA] Closed head injury, initial encounter           ED Disposition       ED Disposition   Discharge    Condition   Stable    Date/Time   Mon Sep 9, 2024  1:58 AM    Comment   Violet Cookbud discharge to home/self care.                   Assessment & Plan       Medical Decision Making  84 year old female brought in for evaluation after a fall at home. Patient seen as a trauma evaluation due to being on plavix with possible head strike. On exam, patient mildly tachycardic, otherwise with normal vitals, in no acute distress. No external signs of injury noted on exam. Given unknown cause of patient's fall, basic labs obtained to rule out underlying kidney dysfunction, electrolyte abnormalities, anemia, or other acute pathology. CT scans of the head and cervical spine as well as CXR ordered to evaluate for acute injuries.     CT scans and CXR negative for acute pathology. Basic labs unremarkable. EKG negative for arrhythmias or acute ischemic changes. Patient unable to provide a urine sample at this time, but no concerns for UTI at this time. Patient's family given reassurance. They feel comfortable taking her home with instructions to follow up with the patient's PCP. Patient discharged home in stable condition with strict ED return precautions.     Amount and/or Complexity of Data Reviewed  Labs: ordered. Decision-making details documented in ED Course.  Radiology: ordered.        ED Course as of 10/27/24 1508   Mon Sep 09, 2024   0144 Potassium(!): 5.9  Slightly hemolyzed, likely falsely elevated.   0157 Poke with patient's daughter regarding test results.  Reassuring workup overall.  Patient's daughter denies any recent urinary symptoms, is okay  with not obtaining a urinalysis at this time.  She states that the patient is currently at her baseline, and is ambulating as she normally does, and that she is comfortable taking her home at this time.  Patient's daughter given symptomatic care instructions and strict ED return precautions, patient discharged home in stable condition.       Medications - No data to display    ED Risk Strat Scores                           SBIRT 22yo+      Flowsheet Row Most Recent Value   Initial Alcohol Screen: US AUDIT-C     1. How often do you have a drink containing alcohol? 0 Filed at: 09/09/2024 0000   2. How many drinks containing alcohol do you have on a typical day you are drinking?  0 Filed at: 09/09/2024 0000   3b. FEMALE Any Age, or MALE 65+: How often do you have 4 or more drinks on one occassion? 0 Filed at: 09/09/2024 0000   Audit-C Score 0 Filed at: 09/09/2024 0000   DIVYA: How many times in the past year have you...    Used an illegal drug or used a prescription medication for non-medical reasons? Never Filed at: 09/09/2024 0000                            History of Present Illness       Chief Complaint   Patient presents with    Fall     Pt arrives via hospital wheelchair with a c/o fall from standing about 15 min ago. +HS, +BT, unknown LOC       Past Medical History:   Diagnosis Date    Cancer (HCC)     cervical 10 years ago- chemo radiation    Diabetes mellitus (HCC)     Diverticulitis     [place] (hard of hearing)     Hypertension       Past Surgical History:   Procedure Laterality Date    CATARACT EXTRACTION Bilateral     COLONOSCOPY      ELBOW SURGERY Right     HYSTERECTOMY      partial    TOE SURGERY Right     Daughter unsure side      History reviewed. No pertinent family history.   Social History     Tobacco Use    Smoking status: Never     Passive exposure: Never    Smokeless tobacco: Never   Vaping Use    Vaping status: Never Used   Substance Use Topics    Alcohol use: Never    Drug use: Never       E-Cigarette/Vaping    E-Cigarette Use Never User       E-Cigarette/Vaping Substances    Nicotine No     THC No     CBD No     Flavoring No     Other No     Unknown No       I have reviewed and agree with the history as documented.     84 year old female with a past medical history of HTN, DM, and suspected dementia brought in by her family after a fall. Per family, the patient reportedly had a fall while walking to the bathroom. The fall was not witnessed. Unknown loss of consciousness, although they state that she was awake when they got to her immediately after the fall. They believe that the patient hit her head. She does take plavix. The patient is not complaining of any pain anywhere at this time. Family reports that she is at her baseline mental status currently.         Review of Systems   Constitutional:  Negative for fever.   Respiratory:  Negative for cough.    Gastrointestinal:  Negative for abdominal pain and vomiting.   Musculoskeletal:  Negative for arthralgias.   All other systems reviewed and are negative.          Objective       ED Triage Vitals [09/09/24 0000]   Temperature Pulse Blood Pressure Respirations SpO2 Patient Position - Orthostatic VS   97.6 °F (36.4 °C) (!) 113 140/64 20 98 % --      Temp Source Heart Rate Source BP Location FiO2 (%) Pain Score    Oral Monitor Left arm -- No Pain      Vitals      Date and Time Temp Pulse SpO2 Resp BP Pain Score FACES Pain Rating User   09/09/24 0110 -- 99 94 % 18 160/80 No Pain -- JV   09/09/24 0010 -- 109 95 % 25 161/89 No Pain -- JV   09/09/24 0000 97.6 °F (36.4 °C) 113 98 % 20 140/64 No Pain -- ANGEL          Primary Survey:  Airway - intact  Breathing - bilateral breath sounds  Circulation - normal  Disability - GCS 14 (patient's baseline)  Exposure - completed    Physical Exam  Vitals and nursing note reviewed.   Constitutional:       General: She is awake. She is not in acute distress.     Appearance: She is not toxic-appearing.   HENT:      Head:  Normocephalic and atraumatic. No raccoon eyes.      Mouth/Throat:      Lips: Pink.   Eyes:      General: Vision grossly intact. Gaze aligned appropriately.      Pupils: Pupils are equal, round, and reactive to light.   Cardiovascular:      Rate and Rhythm: Regular rhythm. Tachycardia present.      Heart sounds: Normal heart sounds.   Pulmonary:      Effort: Pulmonary effort is normal. No respiratory distress.      Breath sounds: Normal breath sounds.   Chest:      Chest wall: No tenderness.   Abdominal:      Palpations: Abdomen is soft.      Tenderness: There is no abdominal tenderness.   Musculoskeletal:      Cervical back: Full passive range of motion without pain and neck supple. No spinous process tenderness.      Thoracic back: No bony tenderness.      Lumbar back: No bony tenderness.      Right hip: No deformity or bony tenderness.      Left hip: No deformity or bony tenderness.   Skin:     General: Skin is warm and dry.   Neurological:      General: No focal deficit present.      Mental Status: She is alert. Mental status is at baseline.         Results Reviewed       Procedure Component Value Units Date/Time    APTT [[id number]]  (Normal) Collected: 09/09/24 0121    Lab Status: Final result Specimen: Blood from Arm, Right Updated: 09/09/24 0147     PTT 28 seconds     Protime-INR [[id number]]  (Abnormal) Collected: 09/09/24 0121    Lab Status: Final result Specimen: Blood from Arm, Right Updated: 09/09/24 0147     Protime 15.6 seconds      INR 1.16    Narrative:      INR Therapeutic Range    Indication                                             INR Range      Atrial Fibrillation                                               2.0-3.0  Hypercoagulable State                                    2.0.2.3  Left Ventricular Asist Device                            2.0-3.0  Mechanical Heart Valve                                  -    Aortic(with afib, MI, embolism, HF, LA enlargement,    and/or coagulopathy)                                      2.0-3.0 (2.5-3.5)     Mitral                                                             2.5-3.5  Prosthetic/Bioprosthetic Heart Valve               2.0-3.0  Venous thromboembolism (VTE: VT, PE        2.0-3.0    Comprehensive metabolic panel [[id number]]  (Abnormal) Collected: 09/09/24 0048    Lab Status: Final result Specimen: Blood from Arm, Right Updated: 09/09/24 0133     Sodium 134 mmol/L      Potassium 5.9 mmol/L      Chloride 98 mmol/L      CO2 27 mmol/L      ANION GAP 9 mmol/L      BUN 11 mg/dL      Creatinine 0.84 mg/dL      Glucose 134 mg/dL      Calcium 9.7 mg/dL      AST 12 U/L      ALT 7 U/L      Alkaline Phosphatase 87 U/L      Total Protein 7.2 g/dL      Albumin 4.0 g/dL      Total Bilirubin 0.91 mg/dL      eGFR 64 ml/min/1.73sq m     Narrative:      National Kidney Disease Foundation guidelines for Chronic Kidney Disease (CKD):     Stage 1 with normal or high GFR (GFR > 90 mL/min/1.73 square meters)    Stage 2 Mild CKD (GFR = 60-89 mL/min/1.73 square meters)    Stage 3A Moderate CKD (GFR = 45-59 mL/min/1.73 square meters)    Stage 3B Moderate CKD (GFR = 30-44 mL/min/1.73 square meters)    Stage 4 Severe CKD (GFR = 15-29 mL/min/1.73 square meters)    Stage 5 End Stage CKD (GFR <15 mL/min/1.73 square meters)  Note: GFR calculation is accurate only with a steady state creatinine    CBC and differential [[id number]]  (Abnormal) Collected: 09/09/24 0048    Lab Status: Final result Specimen: Blood from Arm, Right Updated: 09/09/24 0056     WBC 9.41 Thousand/uL      RBC 5.39 Million/uL      Hemoglobin 14.9 g/dL      Hematocrit 46.2 %      MCV 86 fL      MCH 27.6 pg      MCHC 32.3 g/dL      RDW 15.0 %      MPV 8.8 fL      Platelets 278 Thousands/uL      nRBC 0 /100 WBCs      Segmented % 79 %      Immature Grans % 1 %      Lymphocytes % 13 %      Monocytes % 6 %      Eosinophils Relative 1 %      Basophils Relative 0 %      Absolute Neutrophils 7.39 Thousands/µL      Absolute Immature  Grans 0.08 Thousand/uL      Absolute Lymphocytes 1.21 Thousands/µL      Absolute Monocytes 0.60 Thousand/µL      Eosinophils Absolute 0.09 Thousand/µL      Basophils Absolute 0.04 Thousands/µL             XR Trauma chest portable   Final Interpretation by Anju Marin MD (09/09 0512)   No acute cardiopulmonary findings.                        Workstation performed: [id number]         TRAUMA - CT head wo contrast   Final Interpretation by Mamie Root MD (09/09 0046)      No acute intracranial abnormality.      The study was marked in EPIC for immediate notification.                  Workstation performed: [id number]         TRAUMA - CT spine cervical wo contrast   Final Interpretation by Mamie Root MD (09/09 0041)      No cervical spine fracture or traumatic malalignment.      The study was marked in EPIC for immediate notification.            Workstation performed: [id number]             Procedures    ED Medication and Procedure Management   Prior to Admission Medications   Prescriptions Last Dose Informant Patient Reported? Taking?   PSYLLIUM HUSK PO  Self, Child Yes No   Sig: Take by mouth   atorvastatin (LIPITOR) 20 mg tablet  Self, Child Yes No   Sig: Take 20 mg by mouth daily at bedtime   clopidogrel (PLAVIX) 75 mg tablet  Self, Child Yes Yes   Sig: Take 75 mg by mouth daily   colestipol (COLESTID) 1 g tablet  Self, Child No No   Sig: Take 2 tablets (2 g total) by mouth 2 (two) times a day   dicyclomine (BENTYL) 20 mg tablet  Self, Child No No   Sig: Take 1 tablet (20 mg total) by mouth every 6 (six) hours   donepezil (ARICEPT) 10 mg tablet  Self, Child Yes No   Sig: take 1 tablet by mouth every day at night   losartan (COZAAR) 25 mg tablet  Self, Child Yes No   Sig: Take 25 mg by mouth daily After dinner      Facility-Administered Medications: None     Discharge Medication List as of 9/9/2024  1:59 AM        CONTINUE these medications which have NOT CHANGED    Details   clopidogrel (PLAVIX) 75 mg  tablet Take 75 mg by mouth daily, Historical Med      atorvastatin (LIPITOR) 20 mg tablet Take 20 mg by mouth daily at bedtime, Historical Med      colestipol (COLESTID) 1 g tablet Take 2 tablets (2 g total) by mouth 2 (two) times a day, Starting Thu 9/5/2024, Normal      dicyclomine (BENTYL) 20 mg tablet Take 1 tablet (20 mg total) by mouth every 6 (six) hours, Starting Thu 9/5/2024, Normal      donepezil (ARICEPT) 10 mg tablet take 1 tablet by mouth every day at night, Historical Med      losartan (COZAAR) 25 mg tablet Take 25 mg by mouth daily After dinner, Historical Med      PSYLLIUM HUSK PO Take by mouth, Historical Med      Empagliflozin (Jardiance) 25 MG TABS Take 25 mg by mouth every evening, Historical Med      magnesium Oxide (MAG-OX) 400 mg TABS Take 2 tablets (800 mg total) by mouth 2 (two) times a day, Starting Tue 9/3/2024, Normal      potassium chloride (MICRO-K) 10 MEQ CR capsule Take 2 capsules (20 mEq total) by mouth 2 (two) times a day, Starting Tue 9/3/2024, Normal           No discharge procedures on file.  ED SEPSIS DOCUMENTATION   Time reflects when diagnosis was documented in both MDM as applicable and the Disposition within this note       Time User Action Codes Description Comment    9/9/2024  1:58 AM Gail Crawford [W19.XXXA] Fall, initial encounter     9/9/2024  1:58 AM Gail Crawford [S09.90XA] Closed head injury, initial encounter                  Gail Crawford, DO  10/27/24 1524

## 2024-11-07 PROBLEM — E86.0 DEHYDRATION DETERMINED BY EXAMINATION: Status: RESOLVED | Noted: 2024-10-08 | Resolved: 2024-11-07

## 2024-11-27 DIAGNOSIS — R19.7 DIARRHEA: ICD-10-CM

## 2024-11-29 RX ORDER — DICYCLOMINE HCL 20 MG
20 TABLET ORAL EVERY 6 HOURS
Qty: 120 TABLET | Refills: 2 | Status: SHIPPED | OUTPATIENT
Start: 2024-11-29

## 2024-12-27 ENCOUNTER — APPOINTMENT (EMERGENCY)
Dept: CT IMAGING | Facility: HOSPITAL | Age: 84
End: 2024-12-27
Payer: MEDICARE

## 2024-12-27 ENCOUNTER — APPOINTMENT (EMERGENCY)
Dept: RADIOLOGY | Facility: HOSPITAL | Age: 84
End: 2024-12-27
Payer: MEDICARE

## 2024-12-27 ENCOUNTER — HOSPITAL ENCOUNTER (EMERGENCY)
Facility: HOSPITAL | Age: 84
Discharge: HOME | End: 2024-12-27
Attending: EMERGENCY MEDICINE
Payer: MEDICARE

## 2024-12-27 VITALS
SYSTOLIC BLOOD PRESSURE: 189 MMHG | OXYGEN SATURATION: 99 % | HEART RATE: 67 BPM | RESPIRATION RATE: 18 BRPM | DIASTOLIC BLOOD PRESSURE: 77 MMHG | TEMPERATURE: 98.4 F

## 2024-12-27 DIAGNOSIS — R06.02 SOB (SHORTNESS OF BREATH): Primary | ICD-10-CM

## 2024-12-27 LAB
2HR DELTA HS TROPONIN: 0 NG/L
ALBUMIN SERPL BCG-MCNC: 4.4 G/DL (ref 3.5–5)
ALP SERPL-CCNC: 83 U/L (ref 34–104)
ALT SERPL W P-5'-P-CCNC: 13 U/L (ref 7–52)
ANION GAP SERPL CALCULATED.3IONS-SCNC: 10 MMOL/L (ref 4–13)
AST SERPL W P-5'-P-CCNC: 13 U/L (ref 13–39)
ATRIAL RATE: 86 BPM
BACTERIA UR QL AUTO: ABNORMAL /HPF
BASOPHILS # BLD AUTO: 0.03 THOUSANDS/ÂΜL (ref 0–0.1)
BASOPHILS NFR BLD AUTO: 0 % (ref 0–1)
BILIRUB SERPL-MCNC: 0.55 MG/DL (ref 0.2–1)
BILIRUB UR QL STRIP: NEGATIVE
BNP SERPL-MCNC: 34 PG/ML (ref 0–100)
BUN SERPL-MCNC: 33 MG/DL (ref 5–25)
CALCIUM SERPL-MCNC: 10.1 MG/DL (ref 8.4–10.2)
CARDIAC TROPONIN I PNL SERPL HS: 3 NG/L (ref ?–50)
CARDIAC TROPONIN I PNL SERPL HS: 3 NG/L (ref ?–50)
CHLORIDE SERPL-SCNC: 100 MMOL/L (ref 96–108)
CLARITY UR: CLEAR
CO2 SERPL-SCNC: 30 MMOL/L (ref 21–32)
COLOR UR: ABNORMAL
CREAT SERPL-MCNC: 0.85 MG/DL (ref 0.6–1.3)
D DIMER PPP FEU-MCNC: 0.45 UG/ML FEU
EOSINOPHIL # BLD AUTO: 0.01 THOUSAND/ÂΜL (ref 0–0.61)
EOSINOPHIL NFR BLD AUTO: 0 % (ref 0–6)
ERYTHROCYTE [DISTWIDTH] IN BLOOD BY AUTOMATED COUNT: 14.3 % (ref 11.6–15.1)
FLUAV AG UPPER RESP QL IA.RAPID: NEGATIVE
FLUBV AG UPPER RESP QL IA.RAPID: NEGATIVE
GFR SERPL CREATININE-BSD FRML MDRD: 63 ML/MIN/1.73SQ M
GLUCOSE SERPL-MCNC: 225 MG/DL (ref 65–140)
GLUCOSE UR STRIP-MCNC: NEGATIVE MG/DL
HCT VFR BLD AUTO: 46.6 % (ref 34.8–46.1)
HGB BLD-MCNC: 15.1 G/DL (ref 11.5–15.4)
HGB UR QL STRIP.AUTO: NEGATIVE
IMM GRANULOCYTES # BLD AUTO: 0.04 THOUSAND/UL (ref 0–0.2)
IMM GRANULOCYTES NFR BLD AUTO: 0 % (ref 0–2)
KETONES UR STRIP-MCNC: NEGATIVE MG/DL
LEUKOCYTE ESTERASE UR QL STRIP: NEGATIVE
LYMPHOCYTES # BLD AUTO: 1.1 THOUSANDS/ÂΜL (ref 0.6–4.47)
LYMPHOCYTES NFR BLD AUTO: 10 % (ref 14–44)
MCH RBC QN AUTO: 28.3 PG (ref 26.8–34.3)
MCHC RBC AUTO-ENTMCNC: 32.4 G/DL (ref 31.4–37.4)
MCV RBC AUTO: 87 FL (ref 82–98)
MONOCYTES # BLD AUTO: 0.79 THOUSAND/ÂΜL (ref 0.17–1.22)
MONOCYTES NFR BLD AUTO: 7 % (ref 4–12)
MUCOUS THREADS UR QL AUTO: ABNORMAL
NEUTROPHILS # BLD AUTO: 9.18 THOUSANDS/ÂΜL (ref 1.85–7.62)
NEUTS SEG NFR BLD AUTO: 83 % (ref 43–75)
NITRITE UR QL STRIP: NEGATIVE
NON-SQ EPI CELLS URNS QL MICRO: ABNORMAL /HPF
NRBC BLD AUTO-RTO: 0 /100 WBCS
P AXIS: 63 DEGREES
PH UR STRIP.AUTO: 5.5 [PH]
PLATELET # BLD AUTO: 283 THOUSANDS/UL (ref 149–390)
PMV BLD AUTO: 8.9 FL (ref 8.9–12.7)
POTASSIUM SERPL-SCNC: 3.9 MMOL/L (ref 3.5–5.3)
PR INTERVAL: 146 MS
PROT SERPL-MCNC: 7.9 G/DL (ref 6.4–8.4)
PROT UR STRIP-MCNC: ABNORMAL MG/DL
QRS AXIS: -13 DEGREES
QRSD INTERVAL: 78 MS
QT INTERVAL: 382 MS
QTC INTERVAL: 457 MS
RBC # BLD AUTO: 5.34 MILLION/UL (ref 3.81–5.12)
RBC #/AREA URNS AUTO: ABNORMAL /HPF
SARS-COV+SARS-COV-2 AG RESP QL IA.RAPID: NEGATIVE
SODIUM SERPL-SCNC: 140 MMOL/L (ref 135–147)
SP GR UR STRIP.AUTO: 1.04 (ref 1–1.03)
T WAVE AXIS: 80 DEGREES
UROBILINOGEN UR STRIP-ACNC: <2 MG/DL
VENTRICULAR RATE: 86 BPM
WBC # BLD AUTO: 11.15 THOUSAND/UL (ref 4.31–10.16)
WBC #/AREA URNS AUTO: ABNORMAL /HPF

## 2024-12-27 PROCEDURE — 81001 URINALYSIS AUTO W/SCOPE: CPT | Performed by: EMERGENCY MEDICINE

## 2024-12-27 PROCEDURE — 80053 COMPREHEN METABOLIC PANEL: CPT | Performed by: EMERGENCY MEDICINE

## 2024-12-27 PROCEDURE — 71260 CT THORAX DX C+: CPT

## 2024-12-27 PROCEDURE — 87811 SARS-COV-2 COVID19 W/OPTIC: CPT | Performed by: EMERGENCY MEDICINE

## 2024-12-27 PROCEDURE — 87086 URINE CULTURE/COLONY COUNT: CPT | Performed by: EMERGENCY MEDICINE

## 2024-12-27 PROCEDURE — 83880 ASSAY OF NATRIURETIC PEPTIDE: CPT | Performed by: EMERGENCY MEDICINE

## 2024-12-27 PROCEDURE — 99285 EMERGENCY DEPT VISIT HI MDM: CPT | Performed by: EMERGENCY MEDICINE

## 2024-12-27 PROCEDURE — 96374 THER/PROPH/DIAG INJ IV PUSH: CPT

## 2024-12-27 PROCEDURE — 93010 ELECTROCARDIOGRAM REPORT: CPT | Performed by: INTERNAL MEDICINE

## 2024-12-27 PROCEDURE — 85025 COMPLETE CBC W/AUTO DIFF WBC: CPT | Performed by: EMERGENCY MEDICINE

## 2024-12-27 PROCEDURE — 93005 ELECTROCARDIOGRAM TRACING: CPT

## 2024-12-27 PROCEDURE — 99285 EMERGENCY DEPT VISIT HI MDM: CPT

## 2024-12-27 PROCEDURE — 96361 HYDRATE IV INFUSION ADD-ON: CPT

## 2024-12-27 PROCEDURE — 84484 ASSAY OF TROPONIN QUANT: CPT | Performed by: EMERGENCY MEDICINE

## 2024-12-27 PROCEDURE — 87804 INFLUENZA ASSAY W/OPTIC: CPT | Performed by: EMERGENCY MEDICINE

## 2024-12-27 PROCEDURE — 74177 CT ABD & PELVIS W/CONTRAST: CPT

## 2024-12-27 PROCEDURE — 85379 FIBRIN DEGRADATION QUANT: CPT | Performed by: EMERGENCY MEDICINE

## 2024-12-27 PROCEDURE — 36415 COLL VENOUS BLD VENIPUNCTURE: CPT | Performed by: EMERGENCY MEDICINE

## 2024-12-27 PROCEDURE — 71045 X-RAY EXAM CHEST 1 VIEW: CPT

## 2024-12-27 RX ORDER — ACETAMINOPHEN 10 MG/ML
1000 INJECTION, SOLUTION INTRAVENOUS ONCE
Status: COMPLETED | OUTPATIENT
Start: 2024-12-27 | End: 2024-12-27

## 2024-12-27 RX ADMIN — IOHEXOL 100 ML: 350 INJECTION, SOLUTION INTRAVENOUS at 15:24

## 2024-12-27 RX ADMIN — SODIUM CHLORIDE 1000 ML: 0.9 INJECTION, SOLUTION INTRAVENOUS at 14:02

## 2024-12-27 RX ADMIN — ACETAMINOPHEN 1000 MG: 10 INJECTION, SOLUTION INTRAVENOUS at 14:03

## 2024-12-27 NOTE — ED PROVIDER NOTES
ED Disposition       None          Assessment & Plan       Medical Decision Making  Patient is an 84-year-old female past medical history of hypertension, diabetes, Alzheimer's dementia, diverticulitis presenting with shortness of breath.  Patient is well-appearing at bedside with stable vitals though currently hypertensive but in no acute distress with lungs clear to auscultation with no signs respiratory distress.  She has no abdominal tenderness, no tenderness to the lower extremities with equal strength in the lower extremities, no spinal tenderness and no other significant physical exam findings.  Will obtain urinalysis to assess for signs of UTI or stones, D-dimer to assess for pulmonary embolism, chest x-ray to assess for pulmonary edema, pneumonia, pneumothorax, labs to assess for electrolyte abnormalities, anemia, cardiac workup to rule out ACS, and pending results of D-dimer will consider CT abdomen pelvis to rule out intra-abdominal pathology.    Amount and/or Complexity of Data Reviewed  Labs: ordered.  Radiology: ordered and independent interpretation performed.    Risk  Prescription drug management.        ED Course as of 12/27/24 2101   Fri Dec 27, 2024   1716 Workup unremarkable, have discussed return precautions and outpatient follow-up and daughter states she understands.       Medications   sodium chloride 0.9 % bolus 1,000 mL (has no administration in time range)   acetaminophen (Ofirmev) injection 1,000 mg (has no administration in time range)       ED Risk Strat Scores                          SBIRT 22yo+      Flowsheet Row Most Recent Value   Initial Alcohol Screen: US AUDIT-C     1. How often do you have a drink containing alcohol? 0 Filed at: 12/27/2024 1240   2. How many drinks containing alcohol do you have on a typical day you are drinking?  0 Filed at: 12/27/2024 1240   3b. FEMALE Any Age, or MALE 65+: How often do you have 4 or more drinks on one occassion? 0 Filed at: 12/27/2024 1240  "  Audit-C Score 0 Filed at: 12/27/2024 1240   DIVYA: How many times in the past year have you...    Used an illegal drug or used a prescription medication for non-medical reasons? Never Filed at: 12/27/2024 1240                            History of Present Illness       Chief Complaint   Patient presents with   • Shortness of Breath     Pt presents in wheelchair. Per daughter, \"pt has been hunched over her walker today while walking today and visibly SOB. Denies cardiac/respiratory hx. Also c/o R upper leg pain. Pt has alzheimer's, so is a poor historian.\"       Past Medical History:   Diagnosis Date   • Cancer (HCC)     cervical 10 years ago- chemo radiation   • Diabetes mellitus (HCC)    • Diverticulitis    • [place] (hard of hearing)    • Hypertension       Past Surgical History:   Procedure Laterality Date   • CATARACT EXTRACTION Bilateral    • COLONOSCOPY     • ELBOW SURGERY Right    • HYSTERECTOMY      partial   • TOE SURGERY Right     Daughter unsure side      History reviewed. No pertinent family history.   Social History     Tobacco Use   • Smoking status: Never     Passive exposure: Never   • Smokeless tobacco: Never   Vaping Use   • Vaping status: Never Used   Substance Use Topics   • Alcohol use: Never   • Drug use: Never      E-Cigarette/Vaping   • E-Cigarette Use Never User       E-Cigarette/Vaping Substances   • Nicotine No    • THC No    • CBD No    • Flavoring No    • Other No    • Unknown No       I have reviewed and agree with the history as documented.     Patient is an 84-year-old female past medical history of Alzheimer's dementia, hypertension, diabetes, diverticulitis presenting with shortness of breath.  Daughter states that she noticed exertional shortness of breath today while patient was trying to ambulate with walker and states she had to be carried to the car due to the shortness of breath.  States she seemed to be having trouble walking and was walking hunched forward which she has done in " the past when she has abdominal pain.  Daughter states this also may be right leg pain.  States she never complains of pain at baseline would not tell her if she was having any.  Denies any vomiting or diarrhea, fevers, cough, falls or injuries.  Has not given her any pain medication this morning.  Notes chronic decreased p.o. intake for the last 3 months.  States that she was in her normal state of health yesterday.        Review of Systems   All other systems reviewed and are negative.          Objective       ED Triage Vitals [12/27/24 1236]   Temperature Pulse Blood Pressure Respirations SpO2 Patient Position - Orthostatic VS   98.4 °F (36.9 °C) 81 167/79 16 99 % Sitting      Temp Source Heart Rate Source BP Location FiO2 (%) Pain Score    Temporal Monitor Left arm -- --      Vitals      Date and Time Temp Pulse SpO2 Resp BP Pain Score FACES Pain Rating User   12/27/24 1330 -- 75 99 % 15 184/74 -- --    12/27/24 1236 98.4 °F (36.9 °C) 81 99 % 16 167/79 -- --             Physical Exam  Vitals reviewed.   Constitutional:       General: She is not in acute distress.     Appearance: Normal appearance. She is not ill-appearing.   HENT:      Mouth/Throat:      Mouth: Mucous membranes are moist.   Eyes:      Conjunctiva/sclera: Conjunctivae normal.      Comments: Normal conjunctiva   Cardiovascular:      Rate and Rhythm: Normal rate and regular rhythm.      Pulses: Normal pulses.      Heart sounds: Normal heart sounds.   Pulmonary:      Effort: Pulmonary effort is normal.      Breath sounds: Normal breath sounds.   Abdominal:      General: Abdomen is flat.      Palpations: Abdomen is soft.      Tenderness: There is no abdominal tenderness.   Musculoskeletal:         General: No swelling. Normal range of motion.      Cervical back: Neck supple.      Right lower leg: No tenderness. No edema.      Left lower leg: No tenderness. No edema.   Skin:     General: Skin is warm and dry.   Neurological:      General: No focal  deficit present.      Mental Status: She is alert.   Psychiatric:         Mood and Affect: Mood normal.         Results Reviewed       Procedure Component Value Units Date/Time    B-Type Natriuretic Peptide(BNP) [[id number]]     Lab Status: No result Specimen: Blood     D-Dimer [[id number]]     Lab Status: No result Specimen: Blood     FLU/COVID Rapid Antigen (30 min. TAT) - Preferred screening test in ED [[id number]]     Lab Status: No result Specimen: Nares from Nose     UA w Reflex to Microscopic w Reflex to Culture [[id number]]     Lab Status: No result Specimen: Urine     CBC and differential [[id number]]     Lab Status: No result Specimen: Blood     Comprehensive metabolic panel [[id number]]     Lab Status: No result Specimen: Blood     HS Troponin 0hr (reflex protocol) [[id number]]     Lab Status: No result Specimen: Blood             XR chest 1 view portable    (Results Pending)   XR chest 2 views    (Results Pending)       ECG 12 Lead Documentation Only    Date/Time: 12/27/2024 4:09 PM    Performed by: Kimmy Matthews DO  Authorized by: Kimmy Matthews DO    Patient location:  ED  Previous ECG:     Previous ECG:  Compared to current    Similarity:  No change  Interpretation:     Interpretation: abnormal    Rate:     ECG rate assessment: normal    Rhythm:     Rhythm: sinus rhythm    Ectopy:     Ectopy: none    QRS:     QRS axis:  Left    QRS intervals:  Normal  Conduction:     Conduction: normal    ST segments:     ST segments:  Normal  T waves:     T waves: non-specific        ED Medication and Procedure Management   Prior to Admission Medications   Prescriptions Last Dose Informant Patient Reported? Taking?   PSYLLIUM HUSK PO  Self, Child Yes No   Sig: Take by mouth   atorvastatin (LIPITOR) 20 mg tablet  Self, Child Yes No   Sig: Take 20 mg by mouth daily at bedtime   clopidogrel (PLAVIX) 75 mg tablet  Self, Child Yes No   Sig: Take 75 mg by mouth daily   colestipol (COLESTID) 1 g tablet  Self, Child No  No   Sig: Take 2 tablets (2 g total) by mouth 2 (two) times a day   dicyclomine (BENTYL) 20 mg tablet   No No   Sig: TAKE 1 TABLET BY MOUTH EVERY 6 HOURS.   donepezil (ARICEPT) 10 mg tablet  Self, Child Yes No   Sig: take 1 tablet by mouth every day at night   dronabinol (MARINOL) 5 MG capsule  Self, Child No No   Sig: Take 1 capsule (5 mg total) by mouth 2 (two) times a day before meals   losartan (COZAAR) 25 mg tablet  Self, Child Yes No   Sig: Take 25 mg by mouth daily After dinner      Facility-Administered Medications: None     Patient's Medications   Discharge Prescriptions    No medications on file     No discharge procedures on file.  ED SEPSIS DOCUMENTATION            Kimmy Matthews DO  12/27/24 7440

## 2024-12-29 LAB — BACTERIA UR CULT: NORMAL

## 2024-12-31 DIAGNOSIS — R19.7 DIARRHEA, UNSPECIFIED TYPE: ICD-10-CM

## 2024-12-31 RX ORDER — COLESTIPOL HYDROCHLORIDE 1 G/1
2 TABLET ORAL 2 TIMES DAILY
Qty: 120 TABLET | Refills: 5 | Status: SHIPPED | OUTPATIENT
Start: 2024-12-31

## 2025-01-02 ENCOUNTER — HOSPITAL ENCOUNTER (EMERGENCY)
Facility: HOSPITAL | Age: 85
Discharge: HOME | End: 2025-01-02
Attending: EMERGENCY MEDICINE
Payer: MEDICARE

## 2025-01-02 ENCOUNTER — APPOINTMENT (EMERGENCY)
Dept: VASCULAR ULTRASOUND | Facility: HOSPITAL | Age: 85
End: 2025-01-02
Payer: MEDICARE

## 2025-01-02 ENCOUNTER — APPOINTMENT (EMERGENCY)
Dept: RADIOLOGY | Facility: HOSPITAL | Age: 85
End: 2025-01-02
Payer: MEDICARE

## 2025-01-02 VITALS
OXYGEN SATURATION: 100 % | DIASTOLIC BLOOD PRESSURE: 68 MMHG | TEMPERATURE: 98 F | HEART RATE: 88 BPM | SYSTOLIC BLOOD PRESSURE: 134 MMHG | RESPIRATION RATE: 18 BRPM
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
## 2025-01-02 DIAGNOSIS — M79.604 RIGHT LEG PAIN: ICD-10-CM

## 2025-01-02 DIAGNOSIS — R26.2 AMBULATORY DYSFUNCTION: ICD-10-CM

## 2025-01-02 PROCEDURE — 72170 X-RAY EXAM OF PELVIS: CPT

## 2025-01-02 PROCEDURE — 73552 X-RAY EXAM OF FEMUR 2/>: CPT

## 2025-01-02 PROCEDURE — 93922 UPR/L XTREMITY ART 2 LEVELS: CPT | Performed by: STUDENT IN AN ORGANIZED HEALTH CARE EDUCATION/TRAINING PROGRAM

## 2025-01-02 PROCEDURE — 93926 LOWER EXTREMITY STUDY: CPT

## 2025-01-02 PROCEDURE — 99284 EMERGENCY DEPT VISIT MOD MDM: CPT

## 2025-01-02 PROCEDURE — 93926 LOWER EXTREMITY STUDY: CPT | Performed by: STUDENT IN AN ORGANIZED HEALTH CARE EDUCATION/TRAINING PROGRAM

## 2025-01-02 PROCEDURE — 99284 EMERGENCY DEPT VISIT MOD MDM: CPT | Performed by: EMERGENCY MEDICINE

## 2025-01-02 NOTE — ED PROVIDER NOTES
Time reflects when diagnosis was documented in both MDM as applicable and the Disposition within this note       Time User Action Codes Description Comment    1/2/2025  4:07 PM Becca Donis [W19.XXXA] Fall from standing, initial encounter     1/2/2025  4:07 PM Becca Donis [R26.2] Ambulatory dysfunction     1/2/2025  4:09 PM Becca Donis [M79.604] Right leg pain           ED Disposition       ED Disposition   Discharge    Condition   Stable    Date/Time   Thu Jan 2, 2025  4:07 PM    Comment   Violet Dick discharge to home/self care.                   Assessment & Plan       Medical Decision Making  Patient is a pleasant 84-year-old female with a past medical history of dementia, continued cognitive and mobility declines with multiple recent hospital stays and ED visits.  Her daughter writes her history today.  She states that her mother continues to complain of right lower extremity pain.  No new falls or traumas though she previously had multiple falls and traumas.  No acute injuries were identified at that time.  She does have a known and longstanding history of peripheral arterial disease.  Pain is apparently exacerbated with weight bearing per her daughter, but states that her mother frequently rubs her right thigh complaining of pain while at rest.   Ddx is broad: occult previously undetected fracture, PAD w/ claudication though proximal pain and pain at rest makes this less likely, no skin changes to suggest erythema    Amount and/or Complexity of Data Reviewed  External Data Reviewed: notes.     Details: Prior ED and hospital d/c notes reviewed  Radiology: ordered. Decision-making details documented in ED Course.    Risk  Diagnosis or treatment significantly limited by social determinants of health.        ED Course as of 01/02/25 1632   Thu Jan 02, 2025   1533 VAS ARTERIAL DUPLEX-LOWER LIMB UNILATERAL  +within range of ischemia, howevre biphasic flow noted distally to  foot.     Likely chronic as is noted on prior CT scans without evidence of acute ischemia or change   1607 Unclear etiology of pain based on generally reassuring workup. Known aortic atherosclerosis on asa, statin, plavix without evidence of acute occlusion.   Xrays without acute bony injury.   Discussed results with patient's daughter. Offered PT/OT/placement. Strongly prefers to take mom home. Mobility aids discussed - patient with walker, no seat. Script provided for wheelchair as well.        Medications - No data to display    ED Risk Strat Scores                          SBIRT 22yo+      Flowsheet Row Most Recent Value   Initial Alcohol Screen: US AUDIT-C     1. How often do you have a drink containing alcohol? 0 Filed at: 01/02/2025 1436   2. How many drinks containing alcohol do you have on a typical day you are drinking?  0 Filed at: 01/02/2025 1436   3b. FEMALE Any Age, or MALE 65+: How often do you have 4 or more drinks on one occassion? 0 Filed at: 01/02/2025 1436   Audit-C Score 0 Filed at: 01/02/2025 1436   DIVYA: How many times in the past year have you...    Used an illegal drug or used a prescription medication for non-medical reasons? Never Filed at: 01/02/2025 1436                            History of Present Illness       Chief Complaint   Patient presents with    Leg Pain     Pain in right leg worse than when she was here last week.    Medical Problem     Patient not eating for past 3 months except ensure.       Past Medical History:   Diagnosis Date    Cancer (HCC)     cervical 10 years ago- chemo radiation    Diabetes mellitus (HCC)     Diverticulitis     [place] (hard of hearing)     Hypertension       Past Surgical History:   Procedure Laterality Date    CATARACT EXTRACTION Bilateral     COLONOSCOPY      ELBOW SURGERY Right     HYSTERECTOMY      partial    TOE SURGERY Right     Daughter unsure side      History reviewed. No pertinent family history.   Social History     Tobacco Use    Smoking  status: Never     Passive exposure: Never    Smokeless tobacco: Never   Vaping Use    Vaping status: Never Used   Substance Use Topics    Alcohol use: Never    Drug use: Never      E-Cigarette/Vaping    E-Cigarette Use Never User       E-Cigarette/Vaping Substances    Nicotine No     THC No     CBD No     Flavoring No     Other No     Unknown No       I have reviewed and agree with the history as documented.     Patient presents with RLE pain, ongoing issue.       History provided by:  Caregiver  History limited by:  Age and dementia      Review of Systems   Unable to perform ROS: Dementia   Constitutional:  Negative for activity change.   Musculoskeletal:  Positive for arthralgias.           Objective       ED Triage Vitals [01/02/25 1243]   Temperature Pulse Blood Pressure Respirations SpO2 Patient Position - Orthostatic VS   98 °F (36.7 °C) 88 134/68 18 100 % Sitting      Temp Source Heart Rate Source BP Location FiO2 (%) Pain Score    Oral Monitor Left arm -- No Pain      Vitals      Date and Time Temp Pulse SpO2 Resp BP Pain Score FACES Pain Rating User   01/02/25 1243 98 °F (36.7 °C) 88 100 % 18 134/68 No Pain when she is sitting---cries when attempt to stand on it -- EMR            Physical Exam  Vitals and nursing note reviewed.   Constitutional:       General: She is not in acute distress.     Appearance: She is underweight.      Comments: Frail appearing   HENT:      Head: Normocephalic and atraumatic.      Right Ear: External ear normal.      Left Ear: External ear normal.      Nose: Nose normal.   Cardiovascular:      Rate and Rhythm: Normal rate and regular rhythm.   Pulmonary:      Effort: Pulmonary effort is normal. No respiratory distress.   Abdominal:      Palpations: Abdomen is soft.      Tenderness: There is no abdominal tenderness.   Musculoskeletal:      Right lower leg: No edema.      Left lower leg: No edema.   Skin:     General: Skin is warm and dry.      Findings: No bruising or erythema.    Neurological:      General: No focal deficit present.      Mental Status: She is alert. Mental status is at baseline.   Psychiatric:         Behavior: Behavior normal. Behavior is cooperative.         Results Reviewed       None            XR femur 2 views RIGHT    (Results Pending)   XR pelvis ap only 1 or 2 vw    (Results Pending)   VAS ARTERIAL DUPLEX-LOWER LIMB UNILATERAL    (Results Pending)       Procedures    ED Medication and Procedure Management   Prior to Admission Medications   Prescriptions Last Dose Informant Patient Reported? Taking?   PSYLLIUM HUSK PO  Self, Child Yes No   Sig: Take by mouth   atorvastatin (LIPITOR) 20 mg tablet  Self, Child Yes No   Sig: Take 20 mg by mouth daily at bedtime   clopidogrel (PLAVIX) 75 mg tablet  Self, Child Yes No   Sig: Take 75 mg by mouth daily   colestipol (COLESTID) 1 g tablet   No No   Sig: TAKE 2 TABLETS BY MOUTH 2 TIMES A DAY.   dicyclomine (BENTYL) 20 mg tablet   No No   Sig: TAKE 1 TABLET BY MOUTH EVERY 6 HOURS.   donepezil (ARICEPT) 10 mg tablet  Self, Child Yes No   Sig: take 1 tablet by mouth every day at night   dronabinol (MARINOL) 5 MG capsule  Self, Child No No   Sig: Take 1 capsule (5 mg total) by mouth 2 (two) times a day before meals   losartan (COZAAR) 25 mg tablet  Self, Child Yes No   Sig: Take 25 mg by mouth daily After dinner      Facility-Administered Medications: None     Discharge Medication List as of 1/2/2025  4:10 PM        CONTINUE these medications which have NOT CHANGED    Details   atorvastatin (LIPITOR) 20 mg tablet Take 20 mg by mouth daily at bedtime, Historical Med      clopidogrel (PLAVIX) 75 mg tablet Take 75 mg by mouth daily, Historical Med      colestipol (COLESTID) 1 g tablet TAKE 2 TABLETS BY MOUTH 2 TIMES A DAY., Starting Tue 12/31/2024, Normal      dicyclomine (BENTYL) 20 mg tablet TAKE 1 TABLET BY MOUTH EVERY 6 HOURS., Starting Fri 11/29/2024, Normal      donepezil (ARICEPT) 10 mg tablet take 1 tablet by mouth every day  at night, Historical Med      dronabinol (MARINOL) 5 MG capsule Take 1 capsule (5 mg total) by mouth 2 (two) times a day before meals, Starting Sat 10/12/2024, Normal      losartan (COZAAR) 25 mg tablet Take 25 mg by mouth daily After dinner, Historical Med      PSYLLIUM HUSK PO Take by mouth, Historical Med           Outpatient Discharge Orders   Wheelchair     Walker     ED SEPSIS DOCUMENTATION   Time reflects when diagnosis was documented in both MDM as applicable and the Disposition within this note       Time User Action Codes Description Comment    1/2/2025  4:07 PM Becca Donis [W19.XXXA] Fall from standing, initial encounter     1/2/2025  4:07 PM Becca Donis [R26.2] Ambulatory dysfunction     1/2/2025  4:09 PM Becca Donis [M79.604] Right leg pain                  Becca Donis MD  01/02/25 6167

## 2025-02-28 DIAGNOSIS — R19.7 DIARRHEA: ICD-10-CM

## 2025-02-28 RX ORDER — DICYCLOMINE HCL 20 MG
20 TABLET ORAL EVERY 6 HOURS
Qty: 360 TABLET | Refills: 1 | Status: SHIPPED | OUTPATIENT
Start: 2025-02-28

## 2025-05-01 DIAGNOSIS — R19.7 DIARRHEA, UNSPECIFIED TYPE: ICD-10-CM

## 2025-05-01 RX ORDER — COLESTIPOL HYDROCHLORIDE 1 G/1
2 TABLET ORAL 2 TIMES DAILY
Qty: 360 TABLET | Refills: 1 | Status: SHIPPED | OUTPATIENT
Start: 2025-05-01